# Patient Record
Sex: FEMALE | Race: WHITE | Employment: UNEMPLOYED | URBAN - METROPOLITAN AREA
[De-identification: names, ages, dates, MRNs, and addresses within clinical notes are randomized per-mention and may not be internally consistent; named-entity substitution may affect disease eponyms.]

---

## 2017-01-31 ENCOUNTER — TELEPHONE (OUTPATIENT)
Dept: ONCOLOGY | Age: 27
End: 2017-01-31

## 2017-01-31 NOTE — TELEPHONE ENCOUNTER
She said she rcvd the order for the patient from you, but it's wrong, she may have said the wrong thing, but she meant to tell you it should be for [facility].

## 2018-02-28 ENCOUNTER — OFFICE VISIT (OUTPATIENT)
Dept: SURGERY | Age: 28
End: 2018-02-28

## 2018-02-28 ENCOUNTER — HOSPITAL ENCOUNTER (OUTPATIENT)
Dept: LAB | Age: 28
Discharge: HOME | End: 2018-02-28
Payer: COMMERCIAL

## 2018-02-28 VITALS
BODY MASS INDEX: 44.03 KG/M2 | WEIGHT: 274 LBS | RESPIRATION RATE: 16 BRPM | SYSTOLIC BLOOD PRESSURE: 123 MMHG | HEART RATE: 96 BPM | OXYGEN SATURATION: 98 % | DIASTOLIC BLOOD PRESSURE: 77 MMHG | HEIGHT: 66 IN

## 2018-02-28 DIAGNOSIS — K90.9 INTESTINAL MALABSORPTION, UNSPECIFIED TYPE: ICD-10-CM

## 2018-02-28 DIAGNOSIS — K90.9 INTESTINAL MALABSORPTION, UNSPECIFIED TYPE: Primary | ICD-10-CM

## 2018-02-28 LAB
25(OH)D3 SERPL-MCNC: 16.5 NG/ML (ref 30–100)
ALBUMIN SERPL-MCNC: 3.8 G/DL (ref 3.4–5)
ALBUMIN/GLOB SERPL: 0.9 {RATIO} (ref 0.8–1.7)
ALP SERPL-CCNC: 86 U/L (ref 45–117)
ALT SERPL-CCNC: 25 U/L (ref 13–56)
ANION GAP SERPL CALC-SCNC: 12 MMOL/L (ref 3–18)
AST SERPL-CCNC: 19 U/L (ref 15–37)
BASOPHILS # BLD: 0 K/UL (ref 0–0.06)
BASOPHILS NFR BLD: 0 % (ref 0–2)
BILIRUB SERPL-MCNC: 0.3 MG/DL (ref 0.2–1)
BUN SERPL-MCNC: 14 MG/DL (ref 7–18)
BUN/CREAT SERPL: 19 (ref 12–20)
CALCIUM SERPL-MCNC: 8.8 MG/DL (ref 8.5–10.1)
CHLORIDE SERPL-SCNC: 107 MMOL/L (ref 100–108)
CO2 SERPL-SCNC: 21 MMOL/L (ref 21–32)
CREAT SERPL-MCNC: 0.74 MG/DL (ref 0.6–1.3)
DIFFERENTIAL METHOD BLD: ABNORMAL
EOSINOPHIL # BLD: 0.1 K/UL (ref 0–0.4)
EOSINOPHIL NFR BLD: 1 % (ref 0–5)
ERYTHROCYTE [DISTWIDTH] IN BLOOD BY AUTOMATED COUNT: 13.3 % (ref 11.6–14.5)
GLOBULIN SER CALC-MCNC: 4.3 G/DL (ref 2–4)
GLUCOSE SERPL-MCNC: 95 MG/DL (ref 74–99)
HCT VFR BLD AUTO: 43.8 % (ref 35–45)
HGB BLD-MCNC: 14.6 G/DL (ref 12–16)
LYMPHOCYTES # BLD: 2.5 K/UL (ref 0.9–3.6)
LYMPHOCYTES NFR BLD: 34 % (ref 21–52)
MCH RBC QN AUTO: 28 PG (ref 24–34)
MCHC RBC AUTO-ENTMCNC: 33.3 G/DL (ref 31–37)
MCV RBC AUTO: 83.9 FL (ref 74–97)
MONOCYTES # BLD: 0.3 K/UL (ref 0.05–1.2)
MONOCYTES NFR BLD: 5 % (ref 3–10)
NEUTS SEG # BLD: 4.4 K/UL (ref 1.8–8)
NEUTS SEG NFR BLD: 60 % (ref 40–73)
PLATELET # BLD AUTO: 269 K/UL (ref 135–420)
PMV BLD AUTO: 8.7 FL (ref 9.2–11.8)
POTASSIUM SERPL-SCNC: 4.4 MMOL/L (ref 3.5–5.5)
PROT SERPL-MCNC: 8.1 G/DL (ref 6.4–8.2)
RBC # BLD AUTO: 5.22 M/UL (ref 4.2–5.3)
SODIUM SERPL-SCNC: 140 MMOL/L (ref 136–145)
WBC # BLD AUTO: 7.4 K/UL (ref 4.6–13.2)

## 2018-02-28 PROCEDURE — 36415 COLL VENOUS BLD VENIPUNCTURE: CPT | Performed by: SPECIALIST

## 2018-02-28 PROCEDURE — 84443 ASSAY THYROID STIM HORMONE: CPT | Performed by: SPECIALIST

## 2018-02-28 PROCEDURE — 82607 VITAMIN B-12: CPT | Performed by: SPECIALIST

## 2018-02-28 PROCEDURE — 80053 COMPREHEN METABOLIC PANEL: CPT | Performed by: SPECIALIST

## 2018-02-28 PROCEDURE — 84425 ASSAY OF VITAMIN B-1: CPT | Performed by: SPECIALIST

## 2018-02-28 PROCEDURE — 82306 VITAMIN D 25 HYDROXY: CPT | Performed by: SPECIALIST

## 2018-02-28 PROCEDURE — 85025 COMPLETE CBC W/AUTO DIFF WBC: CPT | Performed by: SPECIALIST

## 2018-02-28 PROCEDURE — 82728 ASSAY OF FERRITIN: CPT | Performed by: SPECIALIST

## 2018-02-28 PROCEDURE — 83540 ASSAY OF IRON: CPT | Performed by: SPECIALIST

## 2018-02-28 RX ORDER — CLONAZEPAM 0.5 MG/1
TABLET ORAL
Refills: 0 | COMMUNITY
Start: 2018-01-11 | End: 2019-05-09 | Stop reason: ALTCHOICE

## 2018-02-28 RX ORDER — FLUOXETINE 20 MG/1
TABLET ORAL
Refills: 2 | COMMUNITY
Start: 2018-01-11 | End: 2018-05-09

## 2018-02-28 RX ORDER — LANOLIN ALCOHOL/MO/W.PET/CERES
1000 CREAM (GRAM) TOPICAL DAILY
COMMUNITY
End: 2018-05-16

## 2018-02-28 RX ORDER — LEVONORGESTREL / ETHINYL ESTRADIOL AND ETHINYL ESTRADIOL 150-30(84)
KIT ORAL
Refills: 2 | COMMUNITY
Start: 2018-02-11 | End: 2019-05-09 | Stop reason: ALTCHOICE

## 2018-02-28 NOTE — PROGRESS NOTES
1. Have you been to the ER, urgent care clinic since your last visit? Hospitalized since your last visit? No    2. Have you seen or consulted any other health care providers outside of the [address] [place] [address] since your last visit? Include any pap smears or colon screening. Yes Where: Dr Bria Lawrence, x 1 month ago.

## 2018-02-28 NOTE — PROGRESS NOTES
Revision Surgery Consultation    Subjective: The patient is a 33 y.o. obese female with a Body mass index is 44.22 kg/(m^2). June Glee The patient had a laparoscopic gastric bypass procedure done approximatly 11 years ago in [facility] by Dr. Meghann Cardenas.  her starting weight prior to surgery was 260.  she ultimately lost approximately 81 lbs with a subsequent weight regain of all her weight. her peak EBWL at 2 years out from surgery was 66% and now her current EBWL is 0. her last bariatric follow-up was innumerous ago with Dr. Meghann Cardenas. Bonnie Myles notes that she had no issues in the immediate post-op phase and had no hospital readmissions in the remote post-op phase. she currently is having the following issues related to his health:Weight regain. she is here today to discuss a possible revision of her prior because of weight gain. All of their prior evaluations available by both their PCP's and specialists physicians have been reviewed today either in the Care Everywhere portal or scanned under the media tab. I have spent a large portion of my initial consultation today reviewing the patients current dietary habits which have contributed to their health issues, weight regain and  their current obesity. They understand that generally speaking,  weight regain is  a function of resuming less that ideal dietary habits instead of being a procedural issue. They understand that older procedures are more likely to be associated with a less that perfect procedural result, such as a prior vertical banded gastroplasty or non divided gastric bypass. These procedures are more likely to result in staple line failures with resultant weight regain. This has been explained to the patient via diagrams of these older procedures and given to the patient.     I have suggested to them personally a dietary regimen that they can initiate now to help with their status as it pertains to their weight. They understand that the most important aspect of their journey through their weight loss endeavor will be their adherence to a new lifestyle of healthy eating behavior. They also understand that an adherence to an exercise program will not only help with weight loss but is ultimately important in weight maintenance. Patient Active Problem List    Diagnosis Date Noted    Morbid obesity with BMI of 40.0-44.9, adult ([facility]     Morbid obesity ([facility]     Depression     Borderline diabetes     Intestinal malabsorption     Iron deficiency anemia secondary to inadequate dietary iron intake 12/21/2016    B12 deficiency 12/21/2016    Morbid obesity due to excess calories ([facility] [address] [facility] 07/30/2016    H/O gastric bypass 07/30/2016      Past Surgical History:   Procedure Laterality Date    HX GI      gastric bypass-2007      Social History   Substance Use Topics    Smoking status: Never Smoker    Smokeless tobacco: Never Used    Alcohol use No      Family History   Problem Relation Age of Onset    No Known Problems Mother     No Known Problems Father     No Known Problems Sister     No Known Problems Brother     No Known Problems Maternal Aunt     No Known Problems Maternal Uncle     No Known Problems Paternal Aunt     No Known Problems Paternal Uncle     No Known Problems Maternal Grandmother     No Known Problems Maternal Grandfather     No Known Problems Paternal Grandmother     No Known Problems Paternal Grandfather     No Known Problems Other       Current Outpatient Prescriptions   Medication Sig Dispense Refill    FLUoxetine (PROZAC) 20 mg tablet TAKE 1 TABLET BY MOUTH AT BEDTIME  2    clonazePAM (KLONOPIN) 0.5 mg tablet TAKE 1 TABLET BY MOUTH EVERY DAY AS NEEDED PANIC/ANXIETY  0    L-Norgest&E Estradiol-E Estrad 0.15 mg-30 mcg (84)/10 mcg (7) 3MPk TAKE 1 TABLET BY MOUTH DAILY  2    cyanocobalamin 1,000 mcg tablet Take 1,000 mcg by mouth daily.       MULTIVITS,CA,MINERALS/IRON/FA (ONE-A-DAY WOMENS FORMULA PO) Take  by mouth.        No Known Allergies       Review of Systems:            General - No history or complaints of unexpected fever, chills, or weight loss  Head/Neck - No history or complaints of headache, diplopia, dysphagia, hearing loss  Cardiac - No history or complaints of chest pain, palpitations, murmur, or shortness of breath  Pulmonary - No history or complaints of shortness of breath, productive cough, hemoptysis  Gastrointestinal - No history or complaints of reflux,  abdominal pain, obstipation/constipation, blood per rectum  Genitourinary - No history or complaints of hematuria/dysuria, stress urinary incontinence symptoms, or renal lithiasis  Musculoskeletal - No history or complaints of joint pain or muscular weakness  Hematologic - No history or complaints of bleeding disorders, blood transfusions, sickle cell anemia  Neurologic - No history or complaints of  migraine headaches, seizure activity, syncopal episodes, TIA or stroke  Integumentary - No history or complaints of rashes, abnormal nevi, skin cancer  Gynecological - No history of heavy menses/abnormal menses           Objective:     Visit Vitals    /77 (BP 1 Location: Left arm, BP Patient Position: Sitting)    Pulse 96    Resp 16    Ht 5' 6\" (1.676 m)    Wt 124.3 kg (274 lb)    LMP 01/01/2018 (Approximate)    SpO2 98%    BMI 44.22 kg/m2       Physical Examination: General appearance - alert, well appearing, and in no distress and oriented to person, place, and time  Mental status - alert, oriented to person, place, and time, normal mood, behavior, speech, dress, motor activity, and thought processes  Eyes - pupils equal and reactive, extraocular eye movements intact, sclera anicteric, left eye normal, right eye normal  Ears - right ear normal, left ear normal  Nose - normal and patent, no erythema, discharge or polyps  Mouth - mucous membranes moist, pharynx normal without lesions  Neck - supple, no significant adenopathy  Lymphatics - no palpable lymphadenopathy, no hepatosplenomegaly  Chest - clear to auscultation, no wheezes, rales or rhonchi, symmetric air entry  Heart - normal rate, regular rhythm, normal S1, S2, no murmurs, rubs, clicks or gallops  Abdomen - soft, nontender, nondistended, no masses or organomegaly  Back exam - full range of motion, no tenderness, palpable spasm or pain on motion  Neurological - alert, oriented, normal speech, no focal findings or movement disorder noted  Musculoskeletal - no joint tenderness, deformity or swelling  Extremities - peripheral pulses normal, no pedal edema, no clubbing or cyanosis  Skin - normal coloration and turgor, no rashes, no suspicious skin lesions noted    Labs / Old Records: Old operative reports reviewed if available and are scanned under the media tab or reviewed under Care Everywhere        Assessment:     Morbid obesity status post Lap gastric bypass procedure approximately 11 years ago with complaint of weight regain. Plan: 1. Weight regain-Today in our office I had a lengthy discussion with Hodan Garcia regarding the nature of their prior procedure. We discussed the anatomical changes to their anatomy and how this relates to  contributing weight regain. Our office will continue to attempt to obtain any medical records, if not obtained or available already ,related to their procedure. It was also discussed today that before any decisions can be made regarding a possible revision of their initial  procedure that an upper GI swallow study must be obtained to evaluate their post surgical anatomy. They understand that in patients with a prior open gastric bypass, those patients are not revision candidates due to adhesive disease usually, and the fact that post surgical anatomy  usually can not be improved upon.   They understand if their gastric bypass was performed laparoscopically, then this situation might be more amendable to gastric band placement over their prior gastric bypass. They understand, as I have explained today, that the adhesive disease associated with prior  procedures is at times a rate limiting factor. This precludes our ability to perform a revision procedure safely. The factors that contribute to this are increased risk such as age, health issues and increased risk from a procedural standpoint and have been discussed today. We will proceed with the UGI swallow study as described above. The patient understands all of the above and wishes to proceed with the study. 2.Nutrition-  I have discussed in detail the pitfalls in diet that have contributed to their weight regain and the importance of adhering to a lifelong regimen of dietary goals and proper eating habits. I have discussed the proper lifelong bariatric diet  in detail spending in excess of 20 minutes discussing this. We will schedule them for a dietary consultation with our nutritionist and urge them to continue on a regular follow-up schedule with her. 3.Maintenance vitamins- Today we have discussed the importance of vitamins as it pertains to their procedure and we will obtain appropriate lab to check all levels. They have been provided a handout regarding this today. Total time spent with the patient reviewing their complex history of bariatric surgery,diet, and plan is in excess of 60 minutes.       Secondary Diagnoses:         Signed By: Sridhar Valerio MD     February 28, 2018

## 2018-02-28 NOTE — PATIENT INSTRUCTIONS
Body Mass Index: Care Instructions  Your Care Instructions    Body mass index (BMI) can help you see if your weight is raising your risk for health problems. It uses a formula to compare how much you weigh with how tall you are. · A BMI lower than 18.5 is considered underweight. · A BMI between 18.5 and 24.9 is considered healthy. · A BMI between 25 and 29.9 is considered overweight. A BMI of 30 or higher is considered obese. If your BMI is in the normal range, it means that you have a lower risk for weight-related health problems. If your BMI is in the overweight or obese range, you may be at increased risk for weight-related health problems, such as high blood pressure, heart disease, stroke, arthritis or joint pain, and diabetes. If your BMI is in the underweight range, you may be at increased risk for health problems such as fatigue, lower protection (immunity) against illness, muscle loss, bone loss, hair loss, and hormone problems. BMI is just one measure of your risk for weight-related health problems. You may be at higher risk for health problems if you are not active, you eat an unhealthy diet, or you drink too much alcohol or use tobacco products. Follow-up care is a key part of your treatment and safety. Be sure to make and go to all appointments, and call your doctor if you are having problems. It's also a good idea to know your test results and keep a list of the medicines you take. How can you care for yourself at home? · Practice healthy eating habits. This includes eating plenty of fruits, vegetables, whole grains, lean protein, and low-fat dairy. · If your doctor recommends it, get more exercise. Walking is a good choice. Bit by bit, increase the amount you walk every day. Try for at least 30 minutes on most days of the week. · Do not smoke. Smoking can increase your risk for health problems. If you need help quitting, talk to your doctor about stop-smoking programs and medicines. These can increase your chances of quitting for good. · Limit alcohol to 2 drinks a day for men and 1 drink a day for women. Too much alcohol can cause health problems. If you have a BMI higher than 25  · Your doctor may do other tests to check your risk for weight-related health problems. This may include measuring the distance around your waist. A waist measurement of more than 40 inches in men or 35 inches in women can increase the risk of weight-related health problems. · Talk with your doctor about steps you can take to stay healthy or improve your health. You may need to make lifestyle changes to lose weight and stay healthy, such as changing your diet and getting regular exercise. If you have a BMI lower than 18.5  · Your doctor may do other tests to check your risk for health problems. · Talk with your doctor about steps you can take to stay healthy or improve your health. You may need to make lifestyle changes to gain or maintain weight and stay healthy, such as getting more healthy foods in your diet and doing exercises to build muscle. Where can you learn more? Go to http://hernando-caitlyn.info/. Enter S176 in the search box to learn more about \"Body Mass Index: Care Instructions. \"  Current as of: October 13, 2016  Content Version: 11.4  © 4799-8640 [employer]. Care instructions adapted under license by [employer] (which disclaims liability or warranty for this information). If you have questions about a medical condition or this instruction, always ask your healthcare professional. [place] [address] any warranty or liability for your use of this information. Patient Instructions      1. Continue to monitor carbohydrate and protein intake- remember to keep your           total  carbohydrates to 50 grams or less per day for best results.   2. Remember hydration goals - usually 48 to 64 ounces of liquids per day  3. Continue to work towards exercise goals - minimum 3 days per week of 45          minutes to  1 hour at a time. 4. Remember to take vitamins as directed        Supplement Resource Guide    Importance of Protein:   Maintains lean body mass, produces antibodies to fight off infections, heals wounds, minimizes hair loss, helps to give you energy, helps with satiety, and keeping you full between meals. Importance of Calcium:  Needed for healthy bones and teeth, normal blood clotting, and nervous system functioning, higher risk of osteoporosis and bone disease with non-compliance. Importance of Multivitamins: Many functions. Supply you with extra nutrients that you may be missing from food. May lead to iron deficiency anemia, weakness, fatigue, and many other symptoms with non-compliance. Importance of B Vitamins:  Important for red blood cell formation, metabolism, energy, and helps to maintain a healthy nervous system. Protein Supplement  Find one you like now. Use immediately after surgery. Look for:  35-50g protein each day from your protein supplement once you reach the progression diet. 0-3 g fat per serving  0-3 g sugar per serving    Protein drinks should be split in separate dosages. Recommend: Lifelong  1 year + Calcium Supplement:     Start taking within a month after surgery. Look for: Calcium Citrate Plus D (1500 mg per day)  Recommend: Citracal     .          Avoid chocolate chewable calcium. Can use chewable bariatric or [employer] brand or similar chewable. The body cannot absorb more than 500-600 mg @ a time. Take for Life Multi-vitamin Supplement:      1st Month After Surgery: Any complete chewable, such as: Butternuts Complete chewables. Avoid Butternut sours or gummies. They lack iron and other important nutrients and also have added sugar.     Continue with chewable vitamin or change to adult complete multivitamin one month after surgery. Menstruating women can take a prenatal vitamin. Make sure has at least 18 mg iron and 688-980 mcg folic acid):    Vitamin B12, B Complex Vitamin, and Biotin  Start taking within a month after surgery. Vitamin B12:  1000 mcg of Vitamin B12 three times weekly    Must take sublingually (meaning you take it under your tongue) or in a liquid drop form for easy absorption. B Complex Vitamin: Take a pill or liquid drop form once daily. Biotin: This vitamin can help prevent hair loss.     Recommend 5mg   (5000 mcg) a day  Biotin is Optional

## 2018-02-28 NOTE — MR AVS SNAPSHOT
[address] [facility] 
 
 
 [address] [place] [address] 
[phone] Patient: Herman Kan MRN: [id number] :1990 Visit Information Date & Time Provider Department Dept. Phone Encounter #  
 2018 11:30 AM [facility] 21  Follow-up Instructions Follow-up and Disposition History Your Appointments 3/7/2018  3:00 PM  
NUTRITION COUNSELING with TSS NUTR VISIT2 [employer] [facility] ([address]) Appt Note: 1 of [address] [place] [address] [place] [address]  
  
   
 [address] [place] [address] Upcoming Health Maintenance Date Due  
 HPV AGE 9Y-34Y (1 of 3 - Female 3 Dose Series) 10/22/2002 DTaP/Tdap/Td series (1 - Tdap) 10/22/2012 PAP AKA CERVICAL CYTOLOGY 10/22/2012 Influenza Age 5 to Adult 2017 Allergies as of 2018  Review Complete On: 2018 By: Shirlene Garcia MD  
 No Known Allergies Current Immunizations  Never Reviewed No immunizations on file. Not reviewed this visit You Were Diagnosed With   
  
 Codes Comments Intestinal malabsorption, unspecified type    -  Primary ICD-10-CM: K90.9 ICD-9-CM: 579.9 Vitals BP Pulse Resp Height(growth percentile) Weight(growth percentile) LMP  
 123/77 (BP 1 Location: Left arm, BP Patient Position: Sitting) 96 16 5' 6\" (1.676 m) 274 lb (124.3 kg) 2018 (Approximate) SpO2 BMI OB Status Smoking Status 98% 44.22 kg/m2 Having regular periods Never Smoker Vitals History BMI and BSA Data Body Mass Index Body Surface Area  
 44.22 kg/m 2 2.41 m 2 Your Updated Medication List  
  
   
This list is accurate as of 18 12:27 PM.  Always use your most recent med list.  
  
  
  
  
 clonazePAM 0.5 mg tablet Commonly known as:  KlonoPIN  
TAKE 1 TABLET BY MOUTH EVERY DAY AS NEEDED PANIC/ANXIETY  
  
 cyanocobalamin 1,000 mcg tablet Take 1,000 mcg by mouth daily. FLUoxetine 20 mg tablet Commonly known as:  PROzac TAKE 1 TABLET BY MOUTH AT BEDTIME  
  
 L-Norgest&E Estradiol-E Estrad 0.15 mg-30 mcg (84)/10 mcg (7) 3mpk TAKE 1 TABLET BY MOUTH DAILY  
  
 ONE-A-DAY WOMENS FORMULA PO Take  by mouth. To-Do List   
 02/28/2018 Lab:  TSH 3RD GENERATION   
  
 02/28/2018 Lab:  VITAMIN D, 25 HYDROXY   
  
 04/29/2018 Lab:  CBC WITH AUTOMATED DIFF   
  
 04/29/2018 Lab:  FERRITIN   
  
 04/29/2018 Lab:  IRON   
  
 04/29/2018 Lab:  METABOLIC PANEL, COMPREHENSIVE   
  
 04/29/2018 Lab:  VITAMIN B1, WHOLE BLOOD   
  
 04/29/2018 Lab:  VITAMIN B12 & FOLATE Patient Instructions Body Mass Index: Care Instructions Your Care Instructions Body mass index (BMI) can help you see if your weight is raising your risk for health problems. It uses a formula to compare how much you weigh with how tall you are. · A BMI lower than 18.5 is considered underweight. · A BMI between 18.5 and 24.9 is considered healthy. · A BMI between 25 and 29.9 is considered overweight. A BMI of 30 or higher is considered obese. If your BMI is in the normal range, it means that you have a lower risk for weight-related health problems. If your BMI is in the overweight or obese range, you may be at increased risk for weight-related health problems, such as high blood pressure, heart disease, stroke, arthritis or joint pain, and diabetes. If your BMI is in the underweight range, you may be at increased risk for health problems such as fatigue, lower protection (immunity) against illness, muscle loss, bone loss, hair loss, and hormone problems. BMI is just one measure of your risk for weight-related health problems. You may be at higher risk for health problems if you are not active, you eat an unhealthy diet, or you drink too much alcohol or use tobacco products. Follow-up care is a key part of your treatment and safety. Be sure to make and go to all appointments, and call your doctor if you are having problems. It's also a good idea to know your test results and keep a list of the medicines you take. How can you care for yourself at home? · Practice healthy eating habits. This includes eating plenty of fruits, vegetables, whole grains, lean protein, and low-fat dairy. · If your doctor recommends it, get more exercise. Walking is a good choice. Bit by bit, increase the amount you walk every day. Try for at least 30 minutes on most days of the week. · Do not smoke. Smoking can increase your risk for health problems. If you need help quitting, talk to your doctor about stop-smoking programs and medicines. These can increase your chances of quitting for good. · Limit alcohol to 2 drinks a day for men and 1 drink a day for women. Too much alcohol can cause health problems. If you have a BMI higher than 25 · Your doctor may do other tests to check your risk for weight-related health problems. This may include measuring the distance around your waist. A waist measurement of more than 40 inches in men or 35 inches in women can increase the risk of weight-related health problems. · Talk with your doctor about steps you can take to stay healthy or improve your health. You may need to make lifestyle changes to lose weight and stay healthy, such as changing your diet and getting regular exercise. If you have a BMI lower than 18.5 · Your doctor may do other tests to check your risk for health problems. · Talk with your doctor about steps you can take to stay healthy or improve your health.  You may need to make lifestyle changes to gain or maintain weight and stay healthy, such as getting more healthy foods in your diet and doing exercises to build muscle. Where can you learn more? Go to http://hernando-caitlyn.info/. Enter S176 in the search box to learn more about \"Body Mass Index: Care Instructions. \" Current as of: October 13, 2016 Content Version: 11.4 © 3098-4674 [employer]. Care instructions adapted under license by [employer] (which disclaims liability or warranty for this information). If you have questions about a medical condition or this instruction, always ask your healthcare professional. [place] [address] any warranty or liability for your use of this information. Patient Instructions 1. Continue to monitor carbohydrate and protein intake- remember to keep your           total  carbohydrates to 50 grams or less per day for best results. 2. Remember hydration goals - usually 48 to 64 ounces of liquids per day 3. Continue to work towards exercise goals - minimum 3 days per week of 45          minutes to  1 hour at a time. 4. Remember to take vitamins as directed Supplement Resource Guide Importance of Protein:  
Maintains lean body mass, produces antibodies to fight off infections, heals wounds, minimizes hair loss, helps to give you energy, helps with satiety, and keeping you full between meals. Importance of Calcium: 
Needed for healthy bones and teeth, normal blood clotting, and nervous system functioning, higher risk of osteoporosis and bone disease with non-compliance. Importance of Multivitamins: Many functions. Supply you with extra nutrients that you may be missing from food. May lead to iron deficiency anemia, weakness, fatigue, and many other symptoms with non-compliance. Importance of B Vitamins: 
Important for red blood cell formation, metabolism, energy, and helps to maintain a healthy nervous system. Protein Supplement Find one you like now. Use immediately after surgery. Look for: 
35-50g protein each day from your protein supplement once you reach the progression diet. 0-3 g fat per serving 0-3 g sugar per serving Protein drinks should be split in separate dosages. Recommend: Lifelong 1 year + Calcium Supplement:  
 
Start taking within a month after surgery. Look for: Calcium Citrate Plus D (1500 mg per day) Recommend: Citracal 
 
 . Avoid chocolate chewable calcium. Can use chewable bariatric or [employer]NC brand or similar chewable. The body cannot absorb more than 500-600 mg @ a time. Take for Life Multi-vitamin Supplement:   
 
1st Month After Surgery: Any complete chewable, such as: Llanos Complete chewables. Avoid Llano sours or gummies. They lack iron and other important nutrients and also have added sugar. Continue with chewable vitamin or change to adult complete multivitamin one month after surgery. Menstruating women can take a prenatal vitamin. Make sure has at least 18 mg iron and 515-000 mcg folic acid): Vitamin B12, B Complex Vitamin, and Biotin Start taking within a month after surgery. Vitamin B12:  1000 mcg of Vitamin B12 three times weekly Must take sublingually (meaning you take it under your tongue) or in a liquid drop form for easy absorption. B Complex Vitamin: Take a pill or liquid drop form once daily. Biotin: This vitamin can help prevent hair loss. Recommend 5mg  
(5000 mcg) a day Biotin is Optional  
 
 
 
 
 
 Patient Instructions History Introducing [facility]! Miguelito Stallings introduces [employer] patient portal. Now you can access parts of your medical record, email your doctor's office, and request medication refills online. 1. In your internet browser, go to [email or web address] 2. Click on the First Time User? Click Here link in the Sign In box. You will see the New Member Sign Up page. 3. Enter your Aireon Access Code exactly as it appears below. You will not need to use this code after youve completed the sign-up process. If you do not sign up before the expiration date, you must request a new code. · Aireon Access Code: [email or web address] Expires: 5/29/2018 12:03 PM 
 
4. Enter the last four digits of your Social Security Number (xxxx) and Date of Birth (mm/dd/yyyy) as indicated and click Submit. You will be taken to the next sign-up page. 5. Create a Aireon ID. This will be your Aireon login ID and cannot be changed, so think of one that is secure and easy to remember. 6. Create a Aireon password. You can change your password at any time. 7. Enter your Password Reset Question and Answer. This can be used at a later time if you forget your password. 8. Enter your e-mail address. You will receive e-mail notification when new information is available in [address]. 9. Click Sign Up. You can now view and download portions of your medical record. 10. Click the Download Summary menu link to download a portable copy of your medical information. If you have questions, please visit the Frequently Asked Questions section of the Aireon website. Remember, Aireon is NOT to be used for urgent needs. For medical emergencies, dial 911. Now available from your iPhone and Android! Please provide this summary of care documentation to your next provider. Your primary care clinician is listed as Cristian Tanner. If you have any questions after today's visit, please call [phone].

## 2018-03-01 LAB
FERRITIN SERPL-MCNC: 9 NG/ML (ref 8–388)
FOLATE SERPL-MCNC: 16.1 NG/ML (ref 3.1–17.5)
IRON SERPL-MCNC: 84 UG/DL (ref 50–175)
TSH SERPL DL<=0.05 MIU/L-ACNC: 0.87 UIU/ML (ref 0.36–3.74)
VIT B12 SERPL-MCNC: 277 PG/ML (ref 211–911)

## 2018-03-02 LAB — VIT B1 BLD-SCNC: 137.4 NMOL/L (ref 66.5–200)

## 2018-03-06 ENCOUNTER — TELEPHONE (OUTPATIENT)
Dept: SURGERY | Age: 28
End: 2018-03-06

## 2018-03-06 RX ORDER — ERGOCALCIFEROL 1.25 MG/1
50000 CAPSULE ORAL 2 TIMES WEEKLY
Qty: 52 CAP | Refills: 1 | Status: SHIPPED | OUTPATIENT
Start: 2018-03-09 | End: 2018-05-16

## 2018-03-06 NOTE — TELEPHONE ENCOUNTER
----- Message from Arlene Jones MD sent at 3/3/2018 10:49 AM EST -----  Call in Vitamin D 50,000 units twice weekly for 1 year

## 2018-03-07 ENCOUNTER — CLINICAL SUPPORT (OUTPATIENT)
Dept: SURGERY | Age: 28
End: 2018-03-07

## 2018-03-07 ENCOUNTER — APPOINTMENT (OUTPATIENT)
Dept: GENERAL RADIOLOGY | Age: 28
End: 2018-03-07
Attending: SPECIALIST
Payer: COMMERCIAL

## 2018-03-07 ENCOUNTER — HOSPITAL ENCOUNTER (OUTPATIENT)
Age: 28
Setting detail: OUTPATIENT SURGERY
Discharge: HOME | End: 2018-03-07
Attending: SPECIALIST | Admitting: SPECIALIST
Payer: COMMERCIAL

## 2018-03-07 VITALS
DIASTOLIC BLOOD PRESSURE: 91 MMHG | OXYGEN SATURATION: 99 % | WEIGHT: 274.1 LBS | HEART RATE: 99 BPM | SYSTOLIC BLOOD PRESSURE: 131 MMHG | RESPIRATION RATE: 16 BRPM | TEMPERATURE: 97.4 F | BODY MASS INDEX: 44.05 KG/M2 | HEIGHT: 66 IN

## 2018-03-07 VITALS — WEIGHT: 276 LBS | BODY MASS INDEX: 44.36 KG/M2 | HEIGHT: 66 IN

## 2018-03-07 DIAGNOSIS — E66.01 MORBID OBESITY (HCC): ICD-10-CM

## 2018-03-07 DIAGNOSIS — E66.01 MORBID OBESITY (HCC): Primary | ICD-10-CM

## 2018-03-07 PROCEDURE — 76040000019: Performed by: SPECIALIST

## 2018-03-07 PROCEDURE — 74011000255 HC RX REV CODE- 255: Performed by: SPECIALIST

## 2018-03-07 PROCEDURE — 74240 X-RAY XM UPR GI TRC 1CNTRST: CPT

## 2018-03-07 NOTE — IP AVS SNAPSHOT
[place] 
 
 
 [address] [facility] [address] 
[phone] Patient: Sangeeta Cha MRN: [id number] :1990 About your hospitalization You were admitted on:  2018 You last received care in the:  THE [facility] ENDOSCOPY You were discharged on:  2018 Why you were hospitalized Your primary diagnosis was:  Not on File Follow-up Information None Your Scheduled Appointments 2018  9:30 AM EDT NUTRITION COUNSELING with TSS NUTR VISIT2 [facility] Shawn Ellis ([facility])  
 [address] [place] [address] [place] [address]  
[phone] Discharge Orders None A check jeimy indicates which time of day the medication should be taken. My Medications ASK your doctor about these medications Instructions Each Dose to Equal  
 Morning Noon Evening Bedtime  
 clonazePAM 0.5 mg tablet Commonly known as:  Marta Tan Your last dose was: Your next dose is: TAKE 1 TABLET BY MOUTH EVERY DAY AS NEEDED PANIC/ANXIETY  
     
   
   
   
  
 cyanocobalamin 1,000 mcg tablet Your last dose was: Your next dose is: Take 1,000 mcg by mouth daily. 1000 mcg  
    
   
   
   
  
 ergocalciferol 50,000 unit capsule Commonly known as:  ERGOCALCIFEROL Start taking on:  3/9/2018 Your last dose was: Your next dose is: Take 1 Cap by mouth two (2) times a week for 180 days. [address] Units FLUoxetine 20 mg tablet Commonly known as:  PROzac Your last dose was: Your next dose is: TAKE 1 TABLET BY MOUTH AT BEDTIME  
     
   
   
   
  
 L-Norgest&E Estradiol-E Estrad 0.15 mg-30 mcg (84)/10 mcg (7) 3mpk Your last dose was: Your next dose is: TAKE 1 TABLET BY MOUTH DAILY ONE-A-DAY WOMENS FORMULA PO Your last dose was: Your next dose is: Take  by mouth. Discharge Instructions Verbal and written post adjustment / UGI instructions given. Patient acknowledges understanding. Discussed diet, activities, and s/s that should be reported. Encouraged to call to schedule next appointment and to call with any questions or concerns. Introducing [facility]! [employer] introduces [employer] patient portal. Now you can access parts of your medical record, email your doctor's office, and request medication refills online. 1. In your internet browser, go to [email or web address] 2. Click on the First Time User? Click Here link in the Sign In box. You will see the New Member Sign Up page. 3. Enter your TimeS[employer] Systems Access Code exactly as it appears below. You will not need to use this code after youve completed the sign-up process. If you do not sign up before the expiration date, you must request a new code. · TimeSight Systems Access Code: [email or web address] Expires: 5/29/2018 12:03 PM 
 
4. Enter the last four digits of your Social Security Number (xxxx) and Date of Birth (mm/dd/yyyy) as indicated and click Submit. You will be taken to the next sign-up page. 5. Create a TimeSight Systems ID. This will be your TimeSight Systems login ID and cannot be changed, so think of one that is secure and easy to remember. 6. Create a TimeSight Systems password. You can change your password at any time. 7. Enter your Password Reset Question and Answer. This can be used at a later time if you forget your password. 8. Enter your e-mail address. You will receive e-mail notification when new information is available in [address] 9. Click Sign Up. You can now view and download portions of your medical record. 10. Click the Download Summary menu link to download a portable copy of your medical information. If you have questions, please visit the Frequently Asked Questions section of the MyChart website. Remember, [employer] is NOT to be used for urgent needs. For medical emergencies, dial 911. Now available from your iPhone and Android! Unresulted Labs-Please follow up with your PCP about these lab tests Order Current Status XR GASTROGRAFFIN UPPER GI In process Providers Seen During Your Hospitalization Provider Specialty Primary office phone Reyna Morgan MD General Surgery [phone] Your Primary Care Physician (PCP) Primary Care Physician Office Phone Office Fax Eric Baker [phone] You are allergic to the following No active allergies Recent Documentation Height Weight BMI OB Status Smoking Status 1.676 m 124.3 kg 44.24 kg/m2 Having regular periods Never Smoker Emergency Contacts Name Discharge Info Relation Home Work Mobile SewellMana guillen DISCHARGE CAREGIVER [3] Parent [1] [phone] Patient Belongings The following personal items are in your possession at time of discharge: 
                             
 
  
  
 Please provide this summary of care documentation to your next provider. Signatures-by signing, you are acknowledging that this After Visit Summary has been reviewed with you and you have received a copy. Patient Signature:  ____________________________________________________________ Date:  ____________________________________________________________  
  
[facility] Provider Signature:  ____________________________________________________________ Date:  ____________________________________________________________

## 2018-03-07 NOTE — PROGRESS NOTES
Medical Weight Loss Multi-Disciplinary Program    Name: Zeynep Wells   : 1990    Session# 1  Date: 3/7/2018     Height: 5' 6\" (167.6 cm)    Weight: 125.2 kg (276 lb) lbs. Body mass index is 44.55 kg/(m^2). Pounds Gained: 2    Dietary Instructions    Reviewed intake  Understanding label reading  Understanding low carbohydrates, low sugar, higher protein meals  Understanding proper portions  Dining outside home  Instruction given for personal dietary changes  Discussed perceived compliance  Comments: Pt given brief pre/post-op diet ed and diet hx reviewed. Physical Activity/Exercise    Discussed Perceived Compliance  Reasonable Goals Set  Motivation  Comments: patient usually walks 3-4 days a week for 60 minutes     Behavior Modification    Positive attitude  Comments: Pt is working on the following goals:  Goals:   1. Start an exercise routine of walking 3-4 days a week for 60 minutes  2. Work on increasing water from 2 bottles a day to 4-5 bottles a day - with the overall goal of 64 ounces a day before surgery   3. Work on  out your fluid and your food by 30 minutes   4. Continue using your protein shake as a meal replacement or snack (can drink up to 2 a day)     Candidate for surgery (per RD): Pending     Dietitian: Ronal Gupta is a 33 y.o. female who present for a pre-op evaluation.     Visit Vitals    Ht 5' 6\" (1.676 m)    Wt 125.2 kg (276 lb)    BMI 44.55 kg/m2     Past Medical History:   Diagnosis Date    Borderline diabetes     Depression     Intestinal malabsorption     Morbid obesity ([facility] [address]     Morbid obesity with BMI of 40.0-44.9, adult (HCC)     Psychotic disorder            Procedure:  patient is waiting for resutls from endoscopy to see if she will have a full revision of her pouch or if she will do laprscorpic band over bypass     Reasons for Surgery:  BMI > 40 with one or more medically significant comorbidities    Summary:  Pt given brief pre/post-op diet ed and diet hx reviewed. Pt instructed to follow a low calorie, low carbohydrate, high protein diet of about 4675-6426 calories daily. Pt set several goals. See below. Current Vitamins: vitamin D (prescription), B12, women's one a day MVI     What have you done in the past to try to lose weight? Patient had GBP done at 16, has done weight watchers, adovare, calorie counting     Why didn't you lose weight or keep the weight off?: Patient was able to lose weight with her first surgery but was not able to keep the weight off due to being the age of 14 and at this point she has more weight to lose than her original surgery and feels stuck. Why do you think having weight loss surgery will make it possible for you to lose weight and keep it off? Patient feels the surgery will be more successful and understands better what is expected of her at this age (31) and did not understand all of the lifestyle changes necessary at 14 and there was not follow-up after surgery. Patient Education and Materials Provided:  Supplement [facility], B Vitamin Information, MVI Recommendations, Calcium Citrate Information, Bariatric Supplement Companies, Protein Supplement Information, Fluid Requirements, No Caffeine or Carbonation, No Alcohol for One Year Post Op, 3 Balanced Meals a Day, Food Group Guide, Good Choices Dining Out, No Snacks, No Concentrated Sweets, Support System at Home, Exercising, Support Group Information and Addressed Current Habits / Changes to make    Nutritional Hx: What is the number of meals you eat per day? 2-3 meals   Comment: n/a     Do you eat between meals / snack? yes  Typical snack: pretty much anything     How fast do you eat your meals? slow    How often do you eat fast food? 1 times a week    How many sodas/sugared beverages do you drink per day? Drinks mountain dew (2 a day)     How many caffeinated drinks do you have per day? No coffee, once a week she'll drink tea     How much milk and/or juice do you drink per day? None     How much water do you drink per day? Maybe 2 bottles a day, if that     How often do you consume alcohol? never;     Diet History:  Breakfast  What are you eating and how much? eggs   When? ii   Where? iii   Snacks  What are you eating and how much? None    When? ii   Where? iii   Hydration  What are you eating and how much? Diet mountain dew    When? ii   Where? ii   Lunch  What are you eating and how much? Leftovers from dinner    When? ii   Where? iii   Snacks  What are you eating and how much? Chips or something crunchy (baked chips)    When? ii   Where? iii   Hydration  What are you eating and how much? Diet mountain dew    When? ii   Where? iii   Dinner  What are you eating and how much? Grilled chicken, corn, green beans, brown rice    When? ii   Where? iii   Snacks  What are you eating and how much? Bread and other carbohydrates    When? ii   Where? iii   Hydration  What are you eating and how much? Water or diet mountain dew    When? ii   Where? iii     Exercise:  Do you currently have an exercise routine? no    Goals:   1. Start an exercise routine of walking 3-4 days a week for 60 minutes  2. Work on increasing water from 2 bottles a day to 4-5 bottles a day - with the overall goal of 64 ounces a day before surgery   3. Work on  out your fluid and your food by 30 minutes   4.  Continue using your protein shake as a meal replacement or snack (can drink up to 2 a day)

## 2018-03-07 NOTE — PROCEDURES
11 years post Dr. Eleanor Martinez lap gastric bypass with probable larger than average pouch. Need EGD to see if candidate for revision. See dictation                    PATIENT:  Olga Lidia Rivera    CSN:  [id number]    DATE OF PROCEDURE:  03/07/2018    PREOPERATIVE DIAGNOSIS:  Patient now 11 years postoperative from gastric bypass procedure performed at [facility] with weight regain. POSTOPERATIVE DIAGNOSIS:  Patient now 11 years postoperative from gastric bypass procedure performed at [facility] with weight regain with slightly enlarged gastric pouch. PROCEDURE PERFORMED:  Upper gastrointestinal study with barium. SURGEON:  Alysha Kelley MD    ESTIMATED BLOOD LOSS:  None. SPECIMENS REMOVED:  None. ANESTHESIA:  None. ASSISTANT:  None. IMPLANTS:  None. COMPLICATIONS: None    STATEMENT OF MEDICAL NECESSITY:  The patient is a 78-year-old female who at age 14 underwent a laparoscopic gastric bypass procedure performed at [facility] for a diagnosis of morbid obesity. She lost a considerable amount of weight with the surgery, but then had slow weight regain. She presented to me in consultation for possible revision of surgery. At this time period, she is to undergo the study for such. PROCEDURE:  The patient was brought to the fluoro unit where she was given thin barium. On swallowing the barium, she was noted to have normal peristalsis of her esophagus. She had filling in the distal esophagus with tapering into and through the gastroesophageal junction. On evaluation of the patient fluoroscopically, she had a gastric bubble present within the fundus of her gastric pouch which was unusual and contrast filled a large gastric pouch, which was about at least twice normal size with a retained fundus and a very generous lower aspect of the pouch. Contrast flowed through the gastrojejunal anastomosis in normal fashion with nondilated Maria Luz limb being noted on examination.   At this juncture, the patient might be a candidate for revision of her gastric bypass procedure. We are going to perform an endoscopy on her to assess this further to determine if she is a candidate for revision of gastric bypass versus a Lap-Band placement of her gastric bypass pouch.         Gisel Nguyen MD

## 2018-03-07 NOTE — IP AVS SNAPSHOT
[address] [address] 
[phone] Patient: Cecile Peralta MRN: [id number] :1990 A check jeimy indicates which time of day the medication should be taken. My Medications ASK your doctor about these medications Instructions Each Dose to Equal  
 Morning Noon Evening Bedtime  
 clonazePAM 0.5 mg tablet Commonly known as:  Keke Bolivar Your last dose was: Your next dose is: TAKE 1 TABLET BY MOUTH EVERY DAY AS NEEDED PANIC/ANXIETY  
     
   
   
   
  
 cyanocobalamin 1,000 mcg tablet Your last dose was: Your next dose is: Take 1,000 mcg by mouth daily. 1000 mcg  
    
   
   
   
  
 ergocalciferol 50,000 unit capsule Commonly known as:  ERGOCALCIFEROL Start taking on:  3/9/2018 Your last dose was: Your next dose is: Take 1 Cap by mouth two (2) times a week for 180 days. [address]54 Units FLUoxetine 20 mg tablet Commonly known as:  PROzac Your last dose was: Your next dose is: TAKE 1 TABLET BY MOUTH AT BEDTIME  
     
   
   
   
  
 L-Norgest&E Estradiol-E Estrad 0.15 mg-30 mcg (84)/10 mcg (7) 3mpk Your last dose was: Your next dose is: TAKE 1 TABLET BY MOUTH DAILY  
     
   
   
   
  
 ONE-A-DAY WOMENS FORMULA PO Your last dose was: Your next dose is: Take  by mouth.

## 2018-03-07 NOTE — PATIENT INSTRUCTIONS
Goals: 1. Start an exercise routine of walking 3-4 days a week for 60 minutes  2. Work on increasing water from 2 bottles a day to 4-5 bottles a day - with the overall goal of 64 ounces a day before surgery   3. Work on  out your fluid and your food by 30 minutes   4.  Continue using your protein shake as a meal replacement or snack (can drink up to 2 a day)

## 2018-03-19 ENCOUNTER — HOSPITAL ENCOUNTER (OUTPATIENT)
Dept: LAB | Age: 28
Discharge: HOME | End: 2018-03-19
Payer: COMMERCIAL

## 2018-03-19 DIAGNOSIS — Z01.818 PRE-OP EVALUATION: ICD-10-CM

## 2018-03-19 LAB
ATRIAL RATE: 81 BPM
CALCULATED P AXIS, ECG09: 28 DEGREES
CALCULATED R AXIS, ECG10: 92 DEGREES
CALCULATED T AXIS, ECG11: 4 DEGREES
DIAGNOSIS, 93000: NORMAL
HCG SERPL QL: NEGATIVE
P-R INTERVAL, ECG05: 176 MS
Q-T INTERVAL, ECG07: 384 MS
QRS DURATION, ECG06: 104 MS
QTC CALCULATION (BEZET), ECG08: 446 MS
VENTRICULAR RATE, ECG03: 81 BPM

## 2018-03-19 PROCEDURE — 84703 CHORIONIC GONADOTROPIN ASSAY: CPT | Performed by: SPECIALIST

## 2018-03-19 PROCEDURE — 93005 ELECTROCARDIOGRAM TRACING: CPT

## 2018-03-19 PROCEDURE — 36415 COLL VENOUS BLD VENIPUNCTURE: CPT | Performed by: SPECIALIST

## 2018-03-23 ENCOUNTER — ANESTHESIA EVENT (OUTPATIENT)
Dept: ENDOSCOPY | Age: 28
End: 2018-03-23
Payer: COMMERCIAL

## 2018-03-23 ENCOUNTER — ANESTHESIA (OUTPATIENT)
Dept: ENDOSCOPY | Age: 28
End: 2018-03-23
Payer: COMMERCIAL

## 2018-03-23 ENCOUNTER — HOSPITAL ENCOUNTER (OUTPATIENT)
Age: 28
Setting detail: OUTPATIENT SURGERY
Discharge: HOME | End: 2018-03-23
Attending: SPECIALIST | Admitting: SPECIALIST
Payer: COMMERCIAL

## 2018-03-23 VITALS
HEART RATE: 87 BPM | SYSTOLIC BLOOD PRESSURE: 109 MMHG | BODY MASS INDEX: 44.27 KG/M2 | RESPIRATION RATE: 14 BRPM | HEIGHT: 66 IN | TEMPERATURE: 98.2 F | WEIGHT: 275.5 LBS | DIASTOLIC BLOOD PRESSURE: 68 MMHG | OXYGEN SATURATION: 99 %

## 2018-03-23 LAB — HCG SERPL QL: NEGATIVE

## 2018-03-23 PROCEDURE — 77030020263 HC SOL INJ SOD CL0.9% LFCR 1000ML: Performed by: SPECIALIST

## 2018-03-23 PROCEDURE — 74011250636 HC RX REV CODE- 250/636

## 2018-03-23 PROCEDURE — 74011000250 HC RX REV CODE- 250: Performed by: SPECIALIST

## 2018-03-23 PROCEDURE — 77030009426 HC FCPS BIOP ENDOSC BSC -B: Performed by: SPECIALIST

## 2018-03-23 PROCEDURE — 74011000250 HC RX REV CODE- 250

## 2018-03-23 PROCEDURE — 74011250636 HC RX REV CODE- 250/636: Performed by: SPECIALIST

## 2018-03-23 PROCEDURE — 84703 CHORIONIC GONADOTROPIN ASSAY: CPT | Performed by: SPECIALIST

## 2018-03-23 PROCEDURE — 88305 TISSUE EXAM BY PATHOLOGIST: CPT | Performed by: SPECIALIST

## 2018-03-23 PROCEDURE — 76060000031 HC ANESTHESIA FIRST 0.5 HR: Performed by: SPECIALIST

## 2018-03-23 PROCEDURE — 76040000019: Performed by: SPECIALIST

## 2018-03-23 PROCEDURE — 36415 COLL VENOUS BLD VENIPUNCTURE: CPT | Performed by: SPECIALIST

## 2018-03-23 RX ORDER — SODIUM CHLORIDE, SODIUM LACTATE, POTASSIUM CHLORIDE, CALCIUM CHLORIDE 600; 310; 30; 20 MG/100ML; MG/100ML; MG/100ML; MG/100ML
125 INJECTION, SOLUTION INTRAVENOUS CONTINUOUS
Status: DISCONTINUED | OUTPATIENT
Start: 2018-03-23 | End: 2018-03-23 | Stop reason: HOSPADM

## 2018-03-23 RX ORDER — LIDOCAINE HYDROCHLORIDE 20 MG/ML
INJECTION, SOLUTION EPIDURAL; INFILTRATION; INTRACAUDAL; PERINEURAL AS NEEDED
Status: DISCONTINUED | OUTPATIENT
Start: 2018-03-23 | End: 2018-03-23 | Stop reason: HOSPADM

## 2018-03-23 RX ORDER — PROPOFOL 10 MG/ML
INJECTION, EMULSION INTRAVENOUS AS NEEDED
Status: DISCONTINUED | OUTPATIENT
Start: 2018-03-23 | End: 2018-03-23 | Stop reason: HOSPADM

## 2018-03-23 RX ADMIN — PROPOFOL 100 MG: 10 INJECTION, EMULSION INTRAVENOUS at 13:34

## 2018-03-23 RX ADMIN — PROPOFOL 200 MG: 10 INJECTION, EMULSION INTRAVENOUS at 13:32

## 2018-03-23 RX ADMIN — SODIUM CHLORIDE, SODIUM LACTATE, POTASSIUM CHLORIDE, AND CALCIUM CHLORIDE 125 ML/HR: 600; 310; 30; 20 INJECTION, SOLUTION INTRAVENOUS at 12:40

## 2018-03-23 RX ADMIN — LIDOCAINE HYDROCHLORIDE 80 MG: 20 INJECTION, SOLUTION EPIDURAL; INFILTRATION; INTRACAUDAL; PERINEURAL at 13:32

## 2018-03-23 NOTE — ANESTHESIA PREPROCEDURE EVALUATION
Anesthetic History   No history of anesthetic complications            Review of Systems / Medical History  Patient summary reviewed, nursing notes reviewed and pertinent labs reviewed    Pulmonary  Within defined limits                 Neuro/Psych   Within defined limits           Cardiovascular  Within defined limits                Exercise tolerance: >4 METS     GI/Hepatic/Renal  Within defined limits              Endo/Other        Morbid obesity     Other Findings            Physical Exam    Airway  Mallampati: II  TM Distance: 4 - 6 cm  Neck ROM: normal range of motion   Mouth opening: Normal     Cardiovascular    Rhythm: regular  Rate: normal         Dental  No notable dental hx       Pulmonary  Breath sounds clear to auscultation               Abdominal  GI exam deferred       Other Findings            Anesthetic Plan    ASA: 2  Anesthesia type: MAC          Induction: Intravenous  Anesthetic plan and risks discussed with: Patient

## 2018-03-23 NOTE — DISCHARGE INSTRUCTIONS
[place]   [id number]  1990    EGD DISCHARGE INSTRUCTIONS    Discomfort:  Sore throat- throat lozenges or warm salt water gargle  redness at IV site- apply warm compress to area; if redness or soreness persist- contact your physician  Gaseous discomfort- walking, belching will help relieve any discomfort  You should not operate a vehicle for 12 hours  You should note engage in an occupation involving machinery or appliances for rest of today  You may note drink alcoholic beverages for at least 12 hours  Avoid making any critical decisions for at least 24 hour  DIET  You may resume your regular diet - however -  remember your colon is empty and a heavy meal will produce gas. Avoid these foods:  vegetables, fried / greasy foods, carbonated drinks    ACTIVITY  You may resume your normal daily activities   Spend the remainder of the day resting -  avoid any strenuous activity. CALL M.D. ANY SIGN OF   Increasing pain, nausea, vomiting  Abdominal distension (swelling)  New increased bleeding (oral or rectal)  Fever (chills)  Pain in chest area  Bloody discharge from nose or mouth  Shortness of breath       Follow-up Instructions:   Dr Arielle Perez will call you in one to two weeks. If you do not hear from him, please call his office [phone]. [place]   [id number]  1990        DISCHARGE SUMMARY from Nurse    The following personal items collected during your admission are returned to you:   Dental Appliance: Dental Appliances: None  Vision: Visual Aid: None  Hearing Aid:    Jewelry: Jewelry: Ring (given to mom )  Clothing: Clothing: Footwear, Jacket/Coat, Pants, Shirt, Socks, Undergarments (given to mom)  Other Valuables:  Other Valuables: None  Valuables sent to safe:      PATIENT INSTRUCTIONS:    Take Home Medications:

## 2018-03-23 NOTE — IP AVS SNAPSHOT
Fabiola Smart 
 
 
 [address] [address] 
[phone] Patient: Matilde Caceres MRN: [id number] :1990 About your hospitalization You were admitted on:  2018 [facility]  [facility] You were discharged on:  2018 Why you were hospitalized Your primary diagnosis was:  Not on File Follow-up Information Follow up With Details Comments Contact Info Cher Nelson MD   [address] 
[phone] Lalo Ortiz MD Call in 1 day(s)  [address] [facility] [address] [facility] 
[phone] Your Scheduled Appointments 2018 ESOPHAGOGASTRODUODENOSCOPY (EGD) with Lalo Ortiz MD  
THE [facility] ENDOSCOPY [facility] [place]) [address] [address]  
[phone] 2018  9:30 AM EDT NUTRITION COUNSELING with [facility] NUTR VISIT2 [facility] Katihe Rendon ([address] [facility])  
 [place] [address] [address] [facility]  
[phone] Discharge Orders None A check jeimy indicates which time of day the medication should be taken. My Medications CONTINUE taking these medications Instructions Each Dose to Equal  
 Morning Noon Evening Bedtime  
 clonazePAM 0.5 mg tablet Commonly known as:  Derek Mendez Your last dose was: Your next dose is: TAKE 1 TABLET BY MOUTH EVERY DAY AS NEEDED PANIC/ANXIETY  
     
   
   
   
  
 cyanocobalamin 1,000 mcg tablet Your last dose was: Your next dose is: Take 1,000 mcg by mouth daily. 1000 mcg  
    
   
   
   
  
 ergocalciferol 50,000 unit capsule Commonly known as:  ERGOCALCIFEROL Your last dose was: Your next dose is: Take 1 Cap by mouth two (2) times a week for 180 days. 76190 Units FLUoxetine 20 mg tablet Commonly known as:  PROzac Your last dose was: Your next dose is: TAKE 1 TABLET BY MOUTH AT BEDTIME  
     
   
   
   
  
 L-Norgest&E Estradiol-E Estrad 0.15 mg-30 mcg (84)/10 mcg (7) 3mpk Your last dose was: Your next dose is: TAKE 1 TABLET BY MOUTH DAILY  
     
   
   
   
  
 ONE-A-DAY WOMENS FORMULA PO Your last dose was: Your next dose is: Take  by mouth. Discharge Instructions [place] Blue [id number] 
1990 EGD DISCHARGE INSTRUCTIONS Discomfort: 
Sore throat- throat lozenges or warm salt water gargle 
redness at IV site- apply warm compress to area; if redness or soreness persist- contact your physician Gaseous discomfort- walking, belching will help relieve any discomfort You should not operate a vehicle for 12 hours You should note engage in an occupation involving machinery or appliances for rest of today You may note drink alcoholic beverages for at least 12 hours Avoid making any critical decisions for at least 24 hour DIET You may resume your regular diet  however -  remember your colon is empty and a heavy meal will produce gas. Avoid these foods:  vegetables, fried / greasy foods, carbonated drinks ACTIVITY You may resume your normal daily activities Spend the remainder of the day resting -  avoid any strenuous activity. CALL M.D. ANY SIGN OF Increasing pain, nausea, vomiting Abdominal distension (swelling) New increased bleeding (oral or rectal) Fever (chills) Pain in chest area Bloody discharge from nose or mouth Shortness of breath Follow-up Instructions: 
 Dr Arielle Perez will call you in one to two weeks. If you do not hear from him, please call his office [phone]. [place] Blue [id number] 
1990 DISCHARGE SUMMARY from Nurse The following personal items collected during your admission are returned to you:  
Dental Appliance: Dental Appliances: None Vision: Visual Aid: None Hearing Aid:   
Jewelry: Jewelry: Ring (given to mom ) Clothing: Clothing: Footwear, Jacket/Coat, Pants, Shirt, Socks, Undergarments (given to mom) Other Valuables: Other Valuables: None Valuables sent to safe:   
 
PATIENT INSTRUCTIONS: 
 
Take Home Medications: 
 
 
 
 
 
  
 
  
  
  
Introducing [employer]! Eneida Kruger introduces [employer] patient portal. Now you can access parts of your medical record, email your doctor's office, and request medication refills online. 1. In your internet browser, go to [email or web address]. [employer] 2. Click on the First Time User? Click Here link in the Sign In box. You will see the New Member Sign Up page. 3. Enter your [employer] Access Code exactly as it appears below. You will not need to use this code after youve completed the sign-up process. If you do not sign up before the expiration date, you must request a new code. · [employer] Access Code: [email or web address] Expires: 5/29/2018  1:03 PM 
 
4. Enter the last four digits of your Social Security Number (xxxx) and Date of Birth (mm/dd/yyyy) as indicated and click Submit. You will be taken to the next sign-up page. 5. Create a [employer] ID. This will be your [employer] login ID and cannot be changed, so think of one that is secure and easy to remember. 6. Create a [employer] password. You can change your password at any time. 7. Enter your Password Reset Question and Answer. This can be used at a later time if you forget your password. 8. Enter your e-mail address. You will receive e-mail notification when new information is available in [address] 9. Click Sign Up. You can now view and download portions of your medical record. 10. Click the Download Summary menu link to download a portable copy of your medical information. If you have questions, please visit the Frequently Asked Questions section of the MyChart website. Remember, [employer] is NOT to be used for urgent needs. For medical emergencies, dial 911. Now available from your iPhone and Android! Providers Seen During Your Hospitalization Provider Specialty Primary office phone Jackqulyn Jeans, MD General Surgery [phone] Your Primary Care Physician (PCP) Primary Care Physician Office Phone Office Fax Precious Lorenzo [phone] You are allergic to the following No active allergies Recent Documentation Height Weight BMI OB Status Smoking Status 1.676 m 125 kg 44.47 kg/m2 Having regular periods Never Smoker Emergency Contacts Name Discharge Info Relation Home Work Mobile SewellMana DISCHARGE CAREGIVER [3] Mother [14]   [phone] Patient Belongings The following personal items are in your possession at time of discharge: 
  Dental Appliances: None  Visual Aid: None      Home Medications: None   Jewelry: Ring (given to mom )  Clothing: Footwear, Jacket/Coat, Pants, Shirt, Socks, Undergarments (given to mom)    Other Valuables: None Please provide this summary of care documentation to your next provider. Signatures-by signing, you are acknowledging that this After Visit Summary has been reviewed with you and you have received a copy. Patient Signature:  ____________________________________________________________ Date:  ____________________________________________________________  
  
[facility] Provider Signature:  ____________________________________________________________ Date:  ____________________________________________________________

## 2018-03-23 NOTE — PROCEDURES
Esophagogastroduodenoscopy Procedure Note    Indications: 11 years post gastric bypass via [facility] with increased weight regain and larger than average pouch noted on UGI - pt seeking possible revision    Anesthesia/Sedation: MAC    Pre-Procedure Exam:  Airway: clear   Heart: normal S1and S2    Lungs: clear bilateral  Abdomen: soft, nontender, bowel sounds present and normal in all quadrants   Mental Status: awake, alert, and oriented to person, place, and time      Procedure in Detail:  Informed consent was obtained for the procedure, including conscious sedation. Risks of pancreatitis, infection, perforation, hemorrhage, adverse drug reaction, and aspiration were discussed. The patient was placed in the left lateral decubitus position. Based on the pre-procedure assessment, including review of the patient's medical history, medications, allergies, and review of systems, he had been deemed to be an appropriate candidate for moderate sedation; he was therefore sedated with the medications listed above. He was monitored continuously with electrocardiogram tracing, pulse oximetry, blood pressure monitoring, and direct observation. The [id number] gastroscope was inserted into the mouth and advanced under direct vision to proximal maria luz limb. A careful inspection was made as the gastroscope was withdrawn, including a retroflexed view of the proximal stomach; findings and interventions are described below. Appropriate photodocumentation was obtained.     Findings:     Oropharynx - normal mucosa without abnormalities  Esophagus - normal mucosa without webs, rings, or strictures, no esophagitis or ulcers  Gastric pouch - moderately enlarged gastric pouch with normal mucosa without evidence of ulcers   Anastomosis - normal diameter for timeframe without ulcers or other anatomical abnormalities  Maria Luz limb - normal mucosa without ulcers or obstruction    possible candidate for revision of prior gastric bypass - will complete [address] [place] [address] - decision if revision possible can only be made at time of surgery. Therapies:    biopsy of stomach gastric pouch    Specimens: No specimens were collected. Complications:   None; patient tolerated the procedure well. EBL:  None           Attending Attestation:  I performed the procedure. Recommendations:  - Await pathology. Signed by:  Kim Holder MD

## 2018-03-23 NOTE — H&P
EGD - History and Physical    Subjective: The patient is a 33 y.o. obese female who had a gastric bypass performed at [facility] 11 years ago. She had an UGI that showed a larger than average pouch. She is seeking a possible revision for further weight loss. She understands the need for an EGD before proceeding to a revision.     Bariatric comorbidities continue to include:   Patient Active Problem List   Diagnosis Code    Morbid obesity due to excess calories ([address] E66.01    H/O gastric bypass Z98.890    Iron deficiency anemia secondary to inadequate dietary iron intake D50.8    B12 deficiency E53.8    Morbid obesity with BMI of 40.0-44.9, adult ([facility]) E66.01, Z68.41    Morbid obesity ([address] E66.01    Depression F32.9    Borderline diabetes R73.03    Intestinal malabsorption K90.9        Patient Active Problem List    Diagnosis Date Noted    Morbid obesity with BMI of 40.0-44.9, adult ([address]     Morbid obesity ([address]     Depression     Borderline diabetes     Intestinal malabsorption     Iron deficiency anemia secondary to inadequate dietary iron intake 12/21/2016    B12 deficiency 12/21/2016    Morbid obesity due to excess calories ([address] 07/30/2016    H/O gastric bypass 07/30/2016      Past Surgical History:   Procedure Laterality Date    HX GI      gastric bypass-2007      Social History   Substance Use Topics    Smoking status: Never Smoker    Smokeless tobacco: Never Used    Alcohol use No      Family History   Problem Relation Age of Onset    No Known Problems Mother     No Known Problems Father     No Known Problems Sister     No Known Problems Brother     No Known Problems Maternal Aunt     No Known Problems Maternal Uncle     No Known Problems Paternal Aunt     No Known Problems Paternal Uncle     No Known Problems Maternal Grandmother     No Known Problems Maternal Grandfather     No Known Problems Paternal Grandmother     No Known Problems Paternal Grandfather     No Known Problems Other         No Known Allergies       Review of Systems:        General - No history or complaints of unexpected fever, chills, or weight loss  Head/Neck - No history or complaints of headache, diplopia, dysphagia, hearing loss  Cardiac - No history or complaints of chest pain, palpitations, murmur, or shortness of breath  Pulmonary - No history or complaints of shortness of breath, productive cough, hemoptysis  Gastrointestinal - mild reflux,no  abdominal pain, obstipation/constipation or blood per rectum  Genitourinary - No history or complaints of hematuria/dysuria, stress urinary incontinence symptoms, or renal lithiasis  Musculoskeletal - no joint pain in their knees,  no muscular weakness  Hematologic - No history or complaints of bleeding disorders,  No blood transfusions  Neurologic - No history or complaints of  migraine headaches, seizure activity, syncopal episodes, TIA or stroke  Integumentary - No history or complaints of rashes, abnormal nevi, skin cancer  Gynecological - unremakable    Objective: There were no vitals taken for this visit.     Physical Examination: General appearance - alert, well appearing, and in no distress  Mental status - alert, oriented to person, place, and time  Eyes - pupils equal and reactive, extraocular eye movements intact  Ears - bilateral TM's and external ear canals normal  Nose - normal and patent, no erythema, discharge or polyps  Mouth - mucous membranes moist, pharynx normal without lesions  Neck - supple, no significant adenopathy  Lymphatics - no palpable lymphadenopathy, no hepatosplenomegaly  Chest - clear to auscultation, no wheezes, rales or rhonchi, symmetric air entry  Heart - normal rate, regular rhythm, normal S1, S2, no murmurs, rubs, clicks or gallops  Abdomen - soft, nontender, nondistended, no masses or organomegaly  Back exam - full range of motion, no tenderness, palpable spasm or pain on motion  Neurological - alert, oriented, normal speech, no focal findings or movement disorder noted  Musculoskeletal - no joint tenderness, deformity or swelling  Extremities - peripheral pulses normal, no pedal edema, no clubbing or cyanosis  Skin - normal coloration and turgor, no rashes, no suspicious skin lesions noted    Labs :     Lab Results   Component Value Date/Time    WBC 7.4 02/28/2018 12:55 PM    HGB 14.6 02/28/2018 12:55 PM    HCT 43.8 02/28/2018 12:55 PM    PLATELET 542 92/52/0534 12:55 PM    MCV 83.9 02/28/2018 12:55 PM     Lab Results   Component Value Date/Time    Sodium 140 02/28/2018 12:55 PM    Potassium 4.4 02/28/2018 12:55 PM    Chloride 107 02/28/2018 12:55 PM    CO2 21 02/28/2018 12:55 PM    Anion gap 12 02/28/2018 12:55 PM    Glucose 95 02/28/2018 12:55 PM    BUN 14 02/28/2018 12:55 PM    Creatinine 0.74 02/28/2018 12:55 PM    BUN/Creatinine ratio 19 02/28/2018 12:55 PM    GFR est AA >60 02/28/2018 12:55 PM    GFR est non-AA >60 02/28/2018 12:55 PM    Calcium 8.8 02/28/2018 12:55 PM    Bilirubin, total 0.3 02/28/2018 12:55 PM    AST (SGOT) 19 02/28/2018 12:55 PM    Alk. phosphatase 86 02/28/2018 12:55 PM    Protein, total 8.1 02/28/2018 12:55 PM    Albumin 3.8 02/28/2018 12:55 PM    Globulin 4.3 (H) 02/28/2018 12:55 PM    A-G Ratio 0.9 02/28/2018 12:55 PM    ALT (SGPT) 25 02/28/2018 12:55 PM     Lab Results   Component Value Date/Time    Iron 84 02/28/2018 12:55 PM    TIBC 718 (H) 12/21/2016 01:00 PM    Iron % saturation 3 12/21/2016 01:00 PM    Ferritin 9 02/28/2018 12:55 PM     Lab Results   Component Value Date/Time    Folate 16.1 02/28/2018 12:55 PM     Lab Results   Component Value Date/Time    Vitamin D 25-Hydroxy 16.5 (L) 02/28/2018 12:55 PM           Assessment:     11 year post gastric bypass via [facility] with larger than average pouch on UGI seeking possible revision    Plan:     EGD    Plan for EGD. She understands the risks, benefits and alternatives of the EGD.   She understands the risk include but are not limited to; death, DVT/PE, damage to surrounding structures, perforation of the GI tract, equipment malfunction, bleeding, and infection. She understands all of the above and wishes to proceed with EGD.     Signed By: Angelita Ragland MD     March 23, 2018

## 2018-03-23 NOTE — INTERVAL H&P NOTE
H&P Update:  Alberta Carlos was seen and examined. History and physical has been reviewed. The patient has been examined.  There have been no significant clinical changes since the completion of the originally dated History and Physical.    Signed By: Feroz Limon MD     March 23, 2018 1:19 PM

## 2018-03-23 NOTE — ANESTHESIA POSTPROCEDURE EVALUATION
Post-Anesthesia Evaluation and Assessment    Cardiovascular Function/Vital Signs  Visit Vitals    /67    Pulse 86    Temp 36.7 °C (98 °F)    Resp 16    Ht 5' 6\" (1.676 m)    Wt 125 kg (275 lb 8 oz)    SpO2 100%    BMI 44.47 kg/m2       Patient is status post Procedure(s):  EGD WITH  BIOPSY . Nausea/Vomiting: Controlled. Postoperative hydration reviewed and adequate. Pain:  Pain Scale 1: Numeric (0 - 10) (03/23/18 1400)  Pain Intensity 1: 0 (03/23/18 1400)   Managed. Neurological Status:   Neuro (WDL): Within Defined Limits (03/23/18 1245)   At baseline. Mental Status and Level of Consciousness: Arousable. Pulmonary Status:   O2 Device: Room air (03/23/18 1400)   Adequate oxygenation and airway patent. Complications related to anesthesia: None    Post-anesthesia assessment completed. No concerns. Patient has met all discharge requirements.     Signed By: Amita Leija CRNA    March 23, 2018

## 2018-04-03 ENCOUNTER — NURSE NAVIGATOR (OUTPATIENT)
Dept: SURGERY | Age: 28
End: 2018-04-03

## 2018-04-04 ENCOUNTER — NURSE NAVIGATOR (OUTPATIENT)
Dept: SURGERY | Age: 28
End: 2018-04-04

## 2018-04-04 DIAGNOSIS — E66.01 MORBID OBESITY (HCC): Primary | ICD-10-CM

## 2018-04-06 ENCOUNTER — HOSPITAL ENCOUNTER (OUTPATIENT)
Dept: GENERAL RADIOLOGY | Age: 28
Discharge: HOME | End: 2018-04-06
Attending: SPECIALIST
Payer: COMMERCIAL

## 2018-04-06 DIAGNOSIS — E66.01 MORBID OBESITY (HCC): ICD-10-CM

## 2018-04-06 PROCEDURE — 74241 XR UPPER GI SERIES W KUB: CPT

## 2018-04-06 PROCEDURE — 74011000255 HC RX REV CODE- 255: Performed by: SPECIALIST

## 2018-04-06 RX ADMIN — BARIUM SULFATE 700 MG: 700 TABLET ORAL at 11:36

## 2018-04-06 RX ADMIN — BARIUM SULFATE 50 G: 960 POWDER, FOR SUSPENSION ORAL at 11:35

## 2018-04-06 RX ADMIN — BARIUM SULFATE 100 G: 960 POWDER, FOR SUSPENSION ORAL at 11:34

## 2018-04-11 ENCOUNTER — OFFICE VISIT (OUTPATIENT)
Dept: SURGERY | Age: 28
End: 2018-04-11

## 2018-04-11 VITALS
HEART RATE: 115 BPM | HEIGHT: 66 IN | BODY MASS INDEX: 44.68 KG/M2 | RESPIRATION RATE: 16 BRPM | DIASTOLIC BLOOD PRESSURE: 71 MMHG | SYSTOLIC BLOOD PRESSURE: 108 MMHG | OXYGEN SATURATION: 100 % | WEIGHT: 278 LBS

## 2018-04-11 DIAGNOSIS — Z98.84 H/O GASTRIC BYPASS: ICD-10-CM

## 2018-04-11 DIAGNOSIS — K31.89 DILATION OF STOMACH: Primary | ICD-10-CM

## 2018-04-11 DIAGNOSIS — R73.03 BORDERLINE DIABETES: ICD-10-CM

## 2018-04-11 DIAGNOSIS — E66.01 MORBID OBESITY (HCC): ICD-10-CM

## 2018-04-11 DIAGNOSIS — E53.8 B12 DEFICIENCY: ICD-10-CM

## 2018-04-11 DIAGNOSIS — K90.9 INTESTINAL MALABSORPTION, UNSPECIFIED TYPE: ICD-10-CM

## 2018-04-11 DIAGNOSIS — D50.8 IRON DEFICIENCY ANEMIA SECONDARY TO INADEQUATE DIETARY IRON INTAKE: ICD-10-CM

## 2018-04-11 DIAGNOSIS — E66.01 MORBID OBESITY WITH BMI OF 40.0-44.9, ADULT (HCC): ICD-10-CM

## 2018-04-11 NOTE — MR AVS SNAPSHOT
[facility] 
 
 
 [address] [place] [address] [facility] 
[phone] Patient: Ama Coker MRN: [id number] :1990 Visit Information Date & Time Provider Department Dept. Phone Encounter #  
 2018  9:30 AM MD Mirna Alvarez Burger [facility] [place] [id number] Your Appointments 2019  9:00 AM  
Office Visit with MD Mirna Alvarez Burger [facility]  
 [address] [place] [address]  
  
   
 [address] [place] [address] Upcoming Health Maintenance Date Due  
 HPV AGE 9Y-34Y (1 of 3 - Female 3 Dose Series) 10/22/2002 DTaP/Tdap/Td series (1 - Tdap) 10/22/2012 PAP AKA CERVICAL CYTOLOGY 10/22/2012 Influenza Age 5 to Adult 2017 Allergies as of 2018  Review Complete On: 2018 By: Kezia Krause No Known Allergies Current Immunizations  Never Reviewed No immunizations on file. Not reviewed this visit Vitals BP Pulse Resp Height(growth percentile) Weight(growth percentile) SpO2  
 108/71 (BP 1 Location: Left arm, BP Patient Position: Sitting) (!) 115 16 5' 6\" (1.676 m) 278 lb (126.1 kg) 100% BMI OB Status Smoking Status 44.87 kg/m2 Having regular periods Never Smoker Vitals History BMI and BSA Data Body Mass Index Body Surface Area 44.87 kg/m 2 2.42 m 2 Preferred Pharmacy Pharmacy Name Phone [id number], [address] [place] [address] Your Updated Medication List  
  
   
This list is accurate as of 18 10:32 AM.  Always use your most recent med list.  
  
  
  
  
 clonazePAM 0.5 mg tablet Commonly known as:  KlonoPIN  
TAKE 2 TABLET BY MOUTH EVERY DAY AS NEEDED PANIC/ANXIETY  
  
 cyanocobalamin 1,000 mcg tablet Take 1,000 mcg by mouth daily. ergocalciferol 50,000 unit capsule Commonly known as:  ERGOCALCIFEROL Take 1 Cap by mouth two (2) times a week for 180 days. FLUoxetine 20 mg tablet Commonly known as:  PROzac TAKE 1 TABLET BY MOUTH AT BEDTIME  
  
 L-Norgest&E Estradiol-E Estrad 0.15 mg-30 mcg (84)/10 mcg (7) 3mpk TAKE 1 TABLET BY MOUTH DAILY  
  
 ONE-A-DAY WOMENS FORMULA PO Take  by mouth. Patient Instructions Body Mass Index: Care Instructions Your Care Instructions Body mass index (BMI) can help you see if your weight is raising your risk for health problems. It uses a formula to compare how much you weigh with how tall you are. · A BMI lower than 18.5 is considered underweight. · A BMI between 18.5 and 24.9 is considered healthy. · A BMI between 25 and 29.9 is considered overweight. A BMI of 30 or higher is considered obese. If your BMI is in the normal range, it means that you have a lower risk for weight-related health problems. If your BMI is in the overweight or obese range, you may be at increased risk for weight-related health problems, such as high blood pressure, heart disease, stroke, arthritis or joint pain, and diabetes. If your BMI is in the underweight range, you may be at increased risk for health problems such as fatigue, lower protection (immunity) against illness, muscle loss, bone loss, hair loss, and hormone problems. BMI is just one measure of your risk for weight-related health problems. You may be at higher risk for health problems if you are not active, you eat an unhealthy diet, or you drink too much alcohol or use tobacco products. Follow-up care is a key part of your treatment and safety. Be sure to make and go to all appointments, and call your doctor if you are having problems. It's also a good idea to know your test results and keep a list of the medicines you take. How can you care for yourself at home? · Practice healthy eating habits. This includes eating plenty of fruits, vegetables, whole grains, lean protein, and low-fat dairy. · If your doctor recommends it, get more exercise. Walking is a good choice. Bit by bit, increase the amount you walk every day. Try for at least 30 minutes on most days of the week. · Do not smoke. Smoking can increase your risk for health problems. If you need help quitting, talk to your doctor about stop-smoking programs and medicines. These can increase your chances of quitting for good. · Limit alcohol to 2 drinks a day for men and 1 drink a day for women. Too much alcohol can cause health problems. If you have a BMI higher than 25 · Your doctor may do other tests to check your risk for weight-related health problems. This may include measuring the distance around your waist. A waist measurement of more than 40 inches in men or 35 inches in women can increase the risk of weight-related health problems. · Talk with your doctor about steps you can take to stay healthy or improve your health. You may need to make lifestyle changes to lose weight and stay healthy, such as changing your diet and getting regular exercise. If you have a BMI lower than 18.5 · Your doctor may do other tests to check your risk for health problems. · Talk with your doctor about steps you can take to stay healthy or improve your health. You may need to make lifestyle changes to gain or maintain weight and stay healthy, such as getting more healthy foods in your diet and doing exercises to build muscle. Where can you learn more? Go to http://hernando-caitlyn.info/. Enter S176 in the search box to learn more about \"Body Mass Index: Care Instructions. \" Current as of: October 13, 2016 Content Version: 11.4 © 9106-4575 [employer].  Care instructions adapted under license by [employer] (which disclaims liability or warranty for this information). If you have questions about a medical condition or this instruction, always ask your healthcare professional. [facility] [address] any warranty or liability for your use of this information. Introducing [facility]! [facility] introduces Jogli patient portal. Now you can access parts of your medical record, email your doctor's office, and request medication refills online. 1. In your internet browser, go to [email or web address]. [email or web address] 2. Click on the First Time User? Click Here link in the Sign In box. You will see the New Member Sign Up page. 3. Enter your Jogli Access Code exactly as it appears below. You will not need to use this code after youve completed the sign-up process. If you do not sign up before the expiration date, you must request a new code. · Jogli Access Code: [email or web address] Expires: 5/29/2018  1:03 PM 
 
4. Enter the last four digits of your Social Security Number (xxxx) and Date of Birth (mm/dd/yyyy) as indicated and click Submit. You will be taken to the next sign-up page. 5. Create a Jogli ID. This will be your Jogli login ID and cannot be changed, so think of one that is secure and easy to remember. 6. Create a Jogli password. You can change your password at any time. 7. Enter your Password Reset Question and Answer. This can be used at a later time if you forget your password. 8. Enter your e-mail address. You will receive e-mail notification when new information is available in [address] 9. Click Sign Up. You can now view and download portions of your medical record. 10. Click the Download Summary menu link to download a portable copy of your medical information. If you have questions, please visit the Frequently Asked Questions section of the Jogli website. Remember, Jogli is NOT to be used for urgent needs. For medical emergencies, dial 911. Now available from your iPhone and Android! Please provide this summary of care documentation to your next provider. Your primary care clinician is listed as Rosalind Alberto. If you have any questions after today's visit, please call [phone].

## 2018-04-11 NOTE — PATIENT INSTRUCTIONS
Body Mass Index: Care Instructions  Your Care Instructions    Body mass index (BMI) can help you see if your weight is raising your risk for health problems. It uses a formula to compare how much you weigh with how tall you are. · A BMI lower than 18.5 is considered underweight. · A BMI between 18.5 and 24.9 is considered healthy. · A BMI between 25 and 29.9 is considered overweight. A BMI of 30 or higher is considered obese. If your BMI is in the normal range, it means that you have a lower risk for weight-related health problems. If your BMI is in the overweight or obese range, you may be at increased risk for weight-related health problems, such as high blood pressure, heart disease, stroke, arthritis or joint pain, and diabetes. If your BMI is in the underweight range, you may be at increased risk for health problems such as fatigue, lower protection (immunity) against illness, muscle loss, bone loss, hair loss, and hormone problems. BMI is just one measure of your risk for weight-related health problems. You may be at higher risk for health problems if you are not active, you eat an unhealthy diet, or you drink too much alcohol or use tobacco products. Follow-up care is a key part of your treatment and safety. Be sure to make and go to all appointments, and call your doctor if you are having problems. It's also a good idea to know your test results and keep a list of the medicines you take. How can you care for yourself at home? · Practice healthy eating habits. This includes eating plenty of fruits, vegetables, whole grains, lean protein, and low-fat dairy. · If your doctor recommends it, get more exercise. Walking is a good choice. Bit by bit, increase the amount you walk every day. Try for at least 30 minutes on most days of the week. · Do not smoke. Smoking can increase your risk for health problems. If you need help quitting, talk to your doctor about stop-smoking programs and medicines. These can increase your chances of quitting for good. · Limit alcohol to 2 drinks a day for men and 1 drink a day for women. Too much alcohol can cause health problems. If you have a BMI higher than 25  · Your doctor may do other tests to check your risk for weight-related health problems. This may include measuring the distance around your waist. A waist measurement of more than 40 inches in men or 35 inches in women can increase the risk of weight-related health problems. · Talk with your doctor about steps you can take to stay healthy or improve your health. You may need to make lifestyle changes to lose weight and stay healthy, such as changing your diet and getting regular exercise. If you have a BMI lower than 18.5  · Your doctor may do other tests to check your risk for health problems. · Talk with your doctor about steps you can take to stay healthy or improve your health. You may need to make lifestyle changes to gain or maintain weight and stay healthy, such as getting more healthy foods in your diet and doing exercises to build muscle. Where can you learn more? Go to http://hernando-caitlyn.info/. Enter S176 in the search box to learn more about \"Body Mass Index: Care Instructions. \"  Current as of: October 13, 2016  Content Version: 11.4  © 1317-7739 [employer]wise[employer]. Care instructions adapted under license by [employer] (which disclaims liability or warranty for this information). If you have questions about a medical condition or this instruction, always ask your healthcare professional. [address] any warranty or liability for your use of this information.

## 2018-04-11 NOTE — PROGRESS NOTES
Bariatric Surgery Consultation    Subjective:     Gisela Saul is a 33 y.o. obese female with a Body mass index is 44.87 kg/(m^2). .  she desires surgery at this time because of health issues and quality of life issues. Gisela Saul has been seen by a bariatric nutritionist and has been placed on an appropriate low carbohydrate diet. The patient desires revision of her LGBP that was completed at [facility] 11 years ago for surgical weight loss, however she is here today to review their workup to date. Gisela Saul is here also today to check progress with weight loss / evaluate nutritional status and review all subspecialty clearances in hopes of proceeding to the operating room.      Patient Active Problem List    Diagnosis Date Noted    Morbid obesity with BMI of 40.0-44.9, adult ([address]     Morbid obesity ([address]     Depression     Borderline diabetes     Intestinal malabsorption     Iron deficiency anemia secondary to inadequate dietary iron intake 12/21/2016    B12 deficiency 12/21/2016    Morbid obesity due to excess calories ([address] 07/30/2016    H/O gastric bypass 07/30/2016      Past Surgical History:   Procedure Laterality Date    HX GI      gastric bypass-2007      Social History   Substance Use Topics    Smoking status: Never Smoker    Smokeless tobacco: Never Used    Alcohol use No      Family History   Problem Relation Age of Onset    No Known Problems Mother     No Known Problems Father     No Known Problems Sister     No Known Problems Brother     No Known Problems Maternal Aunt     No Known Problems Maternal Uncle     No Known Problems Paternal Aunt     No Known Problems Paternal Uncle     No Known Problems Maternal Grandmother     No Known Problems Maternal Grandfather     No Known Problems Paternal Grandmother     No Known Problems Paternal Grandfather     No Known Problems Other       Current Outpatient Prescriptions   Medication Sig Dispense Refill    ergocalciferol (ERGOCALCIFEROL) 50,000 unit capsule Take 1 Cap by mouth two (2) times a week for 180 days. 52 Cap 1    FLUoxetine (PROZAC) 20 mg tablet TAKE 1 TABLET BY MOUTH AT BEDTIME  2    clonazePAM (KLONOPIN) 0.5 mg tablet TAKE 2 TABLET BY MOUTH EVERY DAY AS NEEDED PANIC/ANXIETY  0    L-Norgest&E Estradiol-E Estrad 0.15 mg-30 mcg (84)/10 mcg (7) 3MPk TAKE 1 TABLET BY MOUTH DAILY  2    cyanocobalamin 1,000 mcg tablet Take 1,000 mcg by mouth daily.  MULTIVITS,CA,MINERALS/IRON/FA (ONE-A-DAY WOMENS FORMULA PO) Take  by mouth.        No Known Allergies       Review of Systems:            General - No history or complaints of unexpected fever, chills, or weight loss  Head/Neck - No history or complaints of headache, diplopia, dysphagia, hearing loss  Cardiac - No history or complaints of chest pain, palpitations, murmur, or shortness of breath  Pulmonary - No history or complaints of shortness of breath, productive cough, hemoptysis  Gastrointestinal - No history or complaints of reflux,  abdominal pain, obstipation/constipation, blood per rectum  Genitourinary - No history or complaints of hematuria/dysuria, stress urinary incontinence symptoms, or renal lithiasis  Musculoskeletal - No history or complaints of joint pain or muscular weakness  Hematologic - No history or complaints of bleeding disorders, blood transfusions  Neurologic - No history or complaints of  migraine headaches, seizure activity, syncopal episodes, TIA or stroke  Integumentary - No history or complaints of rashes, abnormal nevi, skin cancer  Gynecological - No history of dysmenorrhea or endometriosis           Objective:     Visit Vitals    /71 (BP 1 Location: Left arm, BP Patient Position: Sitting)    Pulse (!) 115    Resp 16    Ht 5' 6\" (1.676 m)    Wt 126.1 kg (278 lb)    SpO2 100%    BMI 44.87 kg/m2       Physical Examination: {male adult master:798237}    Labs:     Recent Results (from the past 2016 hour(s))   FERRITIN Collection Time: 02/28/18 12:55 PM   Result Value Ref Range    Ferritin 9 8 - 518 NG/ML   METABOLIC PANEL, COMPREHENSIVE    Collection Time: 02/28/18 12:55 PM   Result Value Ref Range    Sodium 140 136 - 145 mmol/L    Potassium 4.4 3.5 - 5.5 mmol/L    Chloride 107 100 - 108 mmol/L    CO2 21 21 - 32 mmol/L    Anion gap 12 3.0 - 18 mmol/L    Glucose 95 74 - 99 mg/dL    BUN 14 7.0 - 18 MG/DL    Creatinine 0.74 0.6 - 1.3 MG/DL    BUN/Creatinine ratio 19 12 - 20      GFR est AA >60 >60 ml/min/1.73m2    GFR est non-AA >60 >60 ml/min/1.73m2    Calcium 8.8 8.5 - 10.1 MG/DL    Bilirubin, total 0.3 0.2 - 1.0 MG/DL    ALT (SGPT) 25 13 - 56 U/L    AST (SGOT) 19 15 - 37 U/L    Alk. phosphatase 86 45 - 117 U/L    Protein, total 8.1 6.4 - 8.2 g/dL    Albumin 3.8 3.4 - 5.0 g/dL    Globulin 4.3 (H) 2.0 - 4.0 g/dL    A-G Ratio 0.9 0.8 - 1.7     VITAMIN B1, WHOLE BLOOD    Collection Time: 02/28/18 12:55 PM   Result Value Ref Range    Vitamin B1 137.4 66.5 - 200.0 nmol/L   VITAMIN B12 & FOLATE    Collection Time: 02/28/18 12:55 PM   Result Value Ref Range    Vitamin B12 277 211 - 911 pg/mL    Folate 16.1 3.10 - 17.50 ng/mL   CBC WITH AUTOMATED DIFF    Collection Time: 02/28/18 12:55 PM   Result Value Ref Range    WBC 7.4 4.6 - 13.2 K/uL    RBC 5.22 4.20 - 5.30 M/uL    HGB 14.6 12.0 - 16.0 g/dL    HCT 43.8 35.0 - 45.0 %    MCV 83.9 74.0 - 97.0 FL    MCH 28.0 24.0 - 34.0 PG    MCHC 33.3 31.0 - 37.0 g/dL    RDW 13.3 11.6 - 14.5 %    PLATELET 884 266 - 655 K/uL    MPV 8.7 (L) 9.2 - 11.8 FL    NEUTROPHILS 60 40 - 73 %    LYMPHOCYTES 34 21 - 52 %    MONOCYTES 5 3 - 10 %    EOSINOPHILS 1 0 - 5 %    BASOPHILS 0 0 - 2 %    ABS. NEUTROPHILS 4.4 1.8 - 8.0 K/UL    ABS. LYMPHOCYTES 2.5 0.9 - 3.6 K/UL    ABS. MONOCYTES 0.3 0.05 - 1.2 K/UL    ABS. EOSINOPHILS 0.1 0.0 - 0.4 K/UL    ABS.  BASOPHILS 0.0 0.0 - 0.06 K/UL    DF AUTOMATED     IRON    Collection Time: 02/28/18 12:55 PM   Result Value Ref Range    Iron 84 50 - 175 ug/dL   VITAMIN D, 25 HYDROXY    Collection Time: 02/28/18 12:55 PM   Result Value Ref Range    Vitamin D 25-Hydroxy 16.5 (L) 30 - 100 ng/mL   TSH 3RD GENERATION    Collection Time: 02/28/18 12:55 PM   Result Value Ref Range    TSH 0.87 0.36 - 3.74 uIU/mL   HCG QL SERUM    Collection Time: 03/19/18  3:20 PM   Result Value Ref Range    HCG, Ql. NEGATIVE  NEG     EKG, 12 LEAD, INITIAL    Collection Time: 03/19/18  3:34 PM   Result Value Ref Range    Ventricular Rate 81 BPM    Atrial Rate 81 BPM    P-R Interval 176 ms    QRS Duration 104 ms    Q-T Interval 384 ms    QTC Calculation (Bezet) 446 ms    Calculated P Axis 28 degrees    Calculated R Axis 92 degrees    Calculated T Axis 4 degrees    Diagnosis       Sinus rhythm with marked sinus arrhythmia  Rightward axis  Borderline ECG  No previous ECGs available  Confirmed by Selwyn Harris MD, --- ([id number]) on 3/19/2018 4:58:14 PM     HCG QL SERUM    Collection Time: 03/23/18 12:31 PM   Result Value Ref Range    HCG, Ql. NEGATIVE  NEG             Assessment:     Morbid obesity with associated comorbidity    Plan:     Continuation of Pre-Operative evaluation / clearance. Jevon Cha has returned to the office today to discuss her status as a surgical candidate.  her progress has been noted and reviewed. We will continue the pre-operative process and work towards goals as outlined. she has 0 more pounds to lose before proceeding to the OR.  (gained 3 pounds lost since last visit)  she has 5 more nutritional visits to complete before proceeding to the OR  she has an outstanding Psychologist clearance to review before proceeding to the OR. Jevon Cha understand the rationales for all the above. It has been discussed that given her current condition that the best surgical option for this patient would be the {MLB BARIATRIC SURGERY TIJUI:63653}. Jevon Cha agrees with the surgical choice and has been educated in it's; risks, benefits, and alternatives.   We will continue with the pre-operative evaluation as needed to check progress. Secondary Diagnoses:     Dietary Intervention  - The patient is currently scheduled to see or has been followed by a bariatric nutritionist for an attempt at preoperative weight loss as has been dictated by their insurance carrier. They will be assessed at various times during their follow up to evaluate their progress depending on the length of time that is required once again by their carrier. I have explained the importance of preoperative weight loss and the benefits regarding lower surgical risk and also assisting the patient in reaching their weight loss goal.  Finally they understand their is a physiologic benefit from the standpoint of hepatic volume reduction preoperatively.   I have reiterated the importance of a low carbohydrate and high protein regimen to achieve their stated goal.      Signed By: Clara Fish     April 11, 2018

## 2018-04-11 NOTE — PROGRESS NOTES
Bariatric Surgery Consultation    Subjective:     Derian Castillo is a 33 y.o. obese female with a Body mass index is 44.87 kg/(m^2). .  she desires surgery at this time because of health issues and quality of life issues. Derian Castillo has tried multiple diets in her lifetime most recently tried physician supervised, behavior modification and unsupervised diets  Bariatric comorbidities present are   Patient Active Problem List   Diagnosis Code    Morbid obesity due to excess calories ([address] E66.01    H/O gastric bypass Z98.84    Iron deficiency anemia secondary to inadequate dietary iron intake D50.8    B12 deficiency E53.8    Morbid obesity with BMI of 40.0-44.9, adult ([address] E66.01, Z68.41    Morbid obesity ([address] E66.01    Depression F32.9    Borderline diabetes R73.03    Intestinal malabsorption K90.9    Psychotic disorder F29    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 Derian Castillo is here today to check progress with weight loss / evaluate nutritional status and review all subspecialty clearances in hopes of proceeding to the operating room.      Patient Active Problem List    Diagnosis Date Noted    Psychotic disorder     Dilation of stomach     Morbid obesity with BMI of 40.0-44.9, adult ([address]     Morbid obesity ([address]     Depression     Borderline diabetes     Intestinal malabsorption     Iron deficiency anemia secondary to inadequate dietary iron intake 12/21/2016    B12 deficiency 12/21/2016    Morbid obesity due to excess calories ([address] 07/30/2016    H/O gastric bypass 07/30/2016      Past Surgical History:   Procedure Laterality Date    HX GI      gastric bypass-2007      Social History   Substance Use Topics    Smoking status: Never Smoker    Smokeless tobacco: Never Used    Alcohol use No      Family History Problem Relation Age of Onset    No Known Problems Mother     No Known Problems Father     No Known Problems Sister     No Known Problems Brother     No Known Problems Maternal Aunt     No Known Problems Maternal Uncle     No Known Problems Paternal Aunt     No Known Problems Paternal Uncle     No Known Problems Maternal Grandmother     No Known Problems Maternal Grandfather     No Known Problems Paternal Grandmother     No Known Problems Paternal Grandfather     No Known Problems Other       Current Outpatient Prescriptions   Medication Sig Dispense Refill    ergocalciferol (ERGOCALCIFEROL) 50,000 unit capsule Take 1 Cap by mouth two (2) times a week for 180 days. 52 Cap 1    FLUoxetine (PROZAC) 20 mg tablet TAKE 1 TABLET BY MOUTH AT BEDTIME  2    clonazePAM (KLONOPIN) 0.5 mg tablet TAKE 2 TABLET BY MOUTH EVERY DAY AS NEEDED PANIC/ANXIETY  0    L-Norgest&E Estradiol-E Estrad 0.15 mg-30 mcg (84)/10 mcg (7) 3MPk TAKE 1 TABLET BY MOUTH DAILY  2    cyanocobalamin 1,000 mcg tablet Take 1,000 mcg by mouth daily.  MULTIVITS,CA,MINERALS/IRON/FA (ONE-A-DAY WOMENS FORMULA PO) Take  by mouth.        No Known Allergies       Review of Systems:        General - No history or complaints of unexpected fever, chills, or weight loss  Head/Neck - No history or complaints of headache, diplopia, dysphagia, hearing loss  Cardiac - No history or complaints of chest pain, palpitations, murmur, or shortness of breath  Pulmonary - No history or complaints of shortness of breath, productive cough, hemoptysis  Gastrointestinal - No history or complaints of reflux,  abdominal pain, obstipation/constipation, blood per rectum  Genitourinary - No history or complaints of hematuria/dysuria, stress urinary incontinence symptoms, or renal lithiasis  Musculoskeletal - No history or complaints of joint pain or muscular weakness  Hematologic - No history or complaints of bleeding disorders, blood transfusions, sickle cell anemia  Neurologic - No history or complaints of  migraine headaches, seizure activity, syncopal episodes, TIA or stroke  Integumentary - No history or complaints of rashes, abnormal nevi, skin cancer  Gynecological - No history of heavy menses/abnormal menses    Objective:     Visit Vitals    /71 (BP 1 Location: Left arm, BP Patient Position: Sitting)    Pulse (!) 115    Resp 16    Ht 5' 6\" (1.676 m)    Wt 126.1 kg (278 lb)    SpO2 100%    BMI 44.87 kg/m2       Physical Examination: General appearance - alert, well appearing, and in no distress  Mental status - alert, oriented to person, place, and time  Eyes - pupils equal and reactive, extraocular eye movements intact  Ears - bilateral TM's and external ear canals normal  Nose - normal and patent, no erythema, discharge or polyps  Mouth - mucous membranes moist, pharynx normal without lesions  Neck - supple, no significant adenopathy  Lymphatics - no palpable lymphadenopathy, no hepatosplenomegaly  Chest - clear to auscultation, no wheezes, rales or rhonchi, symmetric air entry  Heart - normal rate, regular rhythm, normal S1, S2, no murmurs, rubs, clicks or gallops  Abdomen - soft, nontender, nondistended, no masses or organomegaly  Back exam - full range of motion, no tenderness, palpable spasm or pain on motion  Neurological - alert, oriented, normal speech, no focal findings or movement disorder noted  Musculoskeletal - no joint tenderness, deformity or swelling  Extremities - peripheral pulses normal, no pedal edema, no clubbing or cyanosis  Skin - normal coloration and turgor, no rashes, no suspicious skin lesions noted    Labs:             Assessment:     Morbid obesity with associated comorbidity & 11 years post [facility] gastric bypass with diagnostic studies (UGI and EGD) to suggest a larger than average pouch. Most recent study was a radiopaque cottage cheese UGI as described below.     Examination: Upper GI with KUB.     HISTORY: 33year-old patient with prior gastric bypass surgical procedure.     Fluoroscopic time: 1 minute 48 seconds  Fluoroscopic dose (reference air kerma): 267.836 mGy     TECHNIQUE: Initially, a single contrast upper GI exam was performed in the usual  fashion, a 13 mm barium tablet was given. Subsequently, the patient was given a  meal consisting of 6 ounces of barium mixed with 6 ounces of cottage cheese. The  patient ingested approximately 6 ounces of the mixture. AP and lateral overhead  images were obtained immediately after ingestion of the meal.     FINDINGS:     Initial  projection demonstrates a nonobstructive and nonspecific bowel gas  pattern. Ingestion of oral contrast demonstrates prompt opacification of the  Maria Luz limb, which is of normal caliber. There is additional opacification of the  gastric pouch. The barium pill is noted to be initially present within the  gastric pouch, without discrete translocation through the Maria Luz limb. Estimated  size of the gastric pouch somewhat limited secondary to underdistention  following the barium meal, with the pouch estimated at 7.2 x 3.9 x 2.5 cm in  size; with a volume calculated to be 33.5 mL.     IMPRESSION  IMPRESSION:  1. Postoperative changes from gastric bypass surgical procedure, with estimated  gastric pouch volume as above. Plan:     Continuation of Pre-Operative evaluation / clearance. Micaela Love has returned to the office today to discuss her status as a surgical candidate.  her progress has been noted and reviewed. We will continue the pre-operative process and work towards goals as outlined. she has NA more pounds to lose before proceeding to the OR. (4 pounds gained since initial consult visit 1.5 months ago)  she has several more nutritional visits to complete before proceeding to the OR  she has an outstanding NA clearance to review before proceeding to the OR. Micaela Love understand the rationales for all the above.   It has been discussed that given her post gastric bypass with larger than average condition that the best surgical option for this patient would be the possible revision. Jevon Cha agrees with the surgical choice and has been educated in it's; risks, benefits, and alternatives. We will continue with the pre-operative evaluation as needed to check progress. The patient understands the plan of action    Future plan for this patient would be to proceed with possible revision of her gastric bypass being that I feel that with all the given studies that her pouch is actually big enough to be revised. I question the radiology reading of the cottage cheese UGI and feel like her pouch is bigger than suggested. We will proceed to the OR with plans for a bypass revision. If we decide that she is not a candidate for a formal revision then she wishes to proceed with a restrictive gastric band around her existing gastric pouch. Secondary Diagnoses:     Dietary Intervention  - The patient is currently scheduled to see or has been followed by a bariatric nutritionist for an attempt at preoperative weight loss as has been dictated by their insurance carrier. They will be assessed at various times during their follow up to evaluate their progress depending on the length of time that is required once again by their carrier. I have explained the importance of preoperative weight loss and the benefits regarding lower surgical risk and also assisting the patient in reaching their weight loss goal.  Finally they understand their is a physiologic benefit from the standpoint of hepatic volume reduction preoperatively.   I have reiterated the importance of a low carbohydrate and high protein regimen to achieve their stated goal.      Signed By: Sarah Tubbs MD     April 11, 2018

## 2018-05-01 DIAGNOSIS — Z98.84 BARIATRIC SURGERY STATUS: ICD-10-CM

## 2018-05-01 DIAGNOSIS — E66.01 MORBID OBESITY DUE TO EXCESS CALORIES (HCC): Primary | ICD-10-CM

## 2018-05-01 DIAGNOSIS — Z01.812 BLOOD TESTS PRIOR TO TREATMENT OR PROCEDURE: ICD-10-CM

## 2018-05-07 ENCOUNTER — DOCUMENTATION ONLY (OUTPATIENT)
Dept: SURGERY | Age: 28
End: 2018-05-07

## 2018-05-07 ENCOUNTER — OFFICE VISIT (OUTPATIENT)
Dept: SURGERY | Age: 28
End: 2018-05-07

## 2018-05-07 ENCOUNTER — HOSPITAL ENCOUNTER (OUTPATIENT)
Dept: PREADMISSION TESTING | Age: 28
Discharge: HOME | End: 2018-05-07
Attending: SPECIALIST
Payer: COMMERCIAL

## 2018-05-07 VITALS
DIASTOLIC BLOOD PRESSURE: 84 MMHG | HEART RATE: 64 BPM | HEIGHT: 66 IN | WEIGHT: 276.9 LBS | BODY MASS INDEX: 44.5 KG/M2 | RESPIRATION RATE: 16 BRPM | OXYGEN SATURATION: 98 % | SYSTOLIC BLOOD PRESSURE: 147 MMHG

## 2018-05-07 DIAGNOSIS — K90.9 INTESTINAL MALABSORPTION, UNSPECIFIED TYPE: ICD-10-CM

## 2018-05-07 DIAGNOSIS — E66.01 MORBID OBESITY WITH BMI OF 40.0-44.9, ADULT (HCC): Primary | ICD-10-CM

## 2018-05-07 DIAGNOSIS — E53.8 B12 DEFICIENCY: ICD-10-CM

## 2018-05-07 DIAGNOSIS — R73.03 BORDERLINE DIABETES: ICD-10-CM

## 2018-05-07 DIAGNOSIS — K31.89 DILATION OF STOMACH: ICD-10-CM

## 2018-05-07 DIAGNOSIS — G89.18 POSTOPERATIVE PAIN: Primary | ICD-10-CM

## 2018-05-07 DIAGNOSIS — Z98.84 H/O GASTRIC BYPASS: ICD-10-CM

## 2018-05-07 DIAGNOSIS — D50.8 IRON DEFICIENCY ANEMIA SECONDARY TO INADEQUATE DIETARY IRON INTAKE: ICD-10-CM

## 2018-05-07 DIAGNOSIS — E66.01 MORBID OBESITY (HCC): ICD-10-CM

## 2018-05-07 LAB
ABO + RH BLD: NORMAL
ALBUMIN SERPL-MCNC: 3.5 G/DL (ref 3.4–5)
ALBUMIN/GLOB SERPL: 0.8 {RATIO} (ref 0.8–1.7)
ALP SERPL-CCNC: 86 U/L (ref 45–117)
ALT SERPL-CCNC: 21 U/L (ref 13–56)
ANION GAP SERPL CALC-SCNC: 12 MMOL/L (ref 3–18)
AST SERPL-CCNC: 14 U/L (ref 15–37)
ATRIAL RATE: 76 BPM
BASOPHILS # BLD: 0 K/UL (ref 0–0.06)
BASOPHILS NFR BLD: 0 % (ref 0–2)
BILIRUB SERPL-MCNC: 0.3 MG/DL (ref 0.2–1)
BLOOD GROUP ANTIBODIES SERPL: NORMAL
BUN SERPL-MCNC: 12 MG/DL (ref 7–18)
BUN/CREAT SERPL: 15 (ref 12–20)
CALCIUM SERPL-MCNC: 8.6 MG/DL (ref 8.5–10.1)
CALCULATED P AXIS, ECG09: 64 DEGREES
CALCULATED R AXIS, ECG10: 103 DEGREES
CALCULATED T AXIS, ECG11: 42 DEGREES
CHLORIDE SERPL-SCNC: 105 MMOL/L (ref 100–108)
CO2 SERPL-SCNC: 23 MMOL/L (ref 21–32)
CREAT SERPL-MCNC: 0.78 MG/DL (ref 0.6–1.3)
DIAGNOSIS, 93000: NORMAL
DIFFERENTIAL METHOD BLD: ABNORMAL
EOSINOPHIL # BLD: 0.1 K/UL (ref 0–0.4)
EOSINOPHIL NFR BLD: 1 % (ref 0–5)
ERYTHROCYTE [DISTWIDTH] IN BLOOD BY AUTOMATED COUNT: 13.4 % (ref 11.6–14.5)
GLOBULIN SER CALC-MCNC: 4.3 G/DL (ref 2–4)
GLUCOSE SERPL-MCNC: 92 MG/DL (ref 74–99)
HCT VFR BLD AUTO: 41.3 % (ref 35–45)
HGB BLD-MCNC: 13.4 G/DL (ref 12–16)
LYMPHOCYTES # BLD: 2.9 K/UL (ref 0.9–3.6)
LYMPHOCYTES NFR BLD: 36 % (ref 21–52)
MCH RBC QN AUTO: 27.3 PG (ref 24–34)
MCHC RBC AUTO-ENTMCNC: 32.4 G/DL (ref 31–37)
MCV RBC AUTO: 84.1 FL (ref 74–97)
MONOCYTES # BLD: 0.3 K/UL (ref 0.05–1.2)
MONOCYTES NFR BLD: 4 % (ref 3–10)
NEUTS SEG # BLD: 4.9 K/UL (ref 1.8–8)
NEUTS SEG NFR BLD: 59 % (ref 40–73)
P-R INTERVAL, ECG05: 164 MS
PLATELET # BLD AUTO: 270 K/UL (ref 135–420)
PMV BLD AUTO: 8.7 FL (ref 9.2–11.8)
POTASSIUM SERPL-SCNC: 4.1 MMOL/L (ref 3.5–5.5)
PROT SERPL-MCNC: 7.8 G/DL (ref 6.4–8.2)
Q-T INTERVAL, ECG07: 398 MS
QRS DURATION, ECG06: 100 MS
QTC CALCULATION (BEZET), ECG08: 447 MS
RBC # BLD AUTO: 4.91 M/UL (ref 4.2–5.3)
SODIUM SERPL-SCNC: 140 MMOL/L (ref 136–145)
SPECIMEN EXP DATE BLD: NORMAL
VENTRICULAR RATE, ECG03: 76 BPM
WBC # BLD AUTO: 8.2 K/UL (ref 4.6–13.2)

## 2018-05-07 PROCEDURE — 80053 COMPREHEN METABOLIC PANEL: CPT | Performed by: SPECIALIST

## 2018-05-07 PROCEDURE — 86900 BLOOD TYPING SEROLOGIC ABO: CPT | Performed by: SPECIALIST

## 2018-05-07 PROCEDURE — 93005 ELECTROCARDIOGRAM TRACING: CPT

## 2018-05-07 PROCEDURE — 36415 COLL VENOUS BLD VENIPUNCTURE: CPT | Performed by: SPECIALIST

## 2018-05-07 PROCEDURE — 85025 COMPLETE CBC W/AUTO DIFF WBC: CPT | Performed by: SPECIALIST

## 2018-05-07 RX ORDER — OXYCODONE AND ACETAMINOPHEN 5; 325 MG/1; MG/1
1 TABLET ORAL
Qty: 30 TAB | Refills: 0 | Status: ON HOLD | OUTPATIENT
Start: 2018-05-07 | End: 2018-05-30

## 2018-05-07 RX ORDER — OMEPRAZOLE 20 MG/1
20 CAPSULE, DELAYED RELEASE ORAL DAILY
Qty: 30 CAP | Refills: 2 | Status: SHIPPED | OUTPATIENT
Start: 2018-05-07 | End: 2018-06-06

## 2018-05-07 RX ORDER — FLUOXETINE HYDROCHLORIDE 40 MG/1
60 CAPSULE ORAL
COMMUNITY
End: 2019-05-09 | Stop reason: ALTCHOICE

## 2018-05-07 NOTE — PATIENT INSTRUCTIONS
Body Mass Index: Care Instructions  Your Care Instructions    Body mass index (BMI) can help you see if your weight is raising your risk for health problems. It uses a formula to compare how much you weigh with how tall you are. · A BMI lower than 18.5 is considered underweight. · A BMI between 18.5 and 24.9 is considered healthy. · A BMI between 25 and 29.9 is considered overweight. A BMI of 30 or higher is considered obese. If your BMI is in the normal range, it means that you have a lower risk for weight-related health problems. If your BMI is in the overweight or obese range, you may be at increased risk for weight-related health problems, such as high blood pressure, heart disease, stroke, arthritis or joint pain, and diabetes. If your BMI is in the underweight range, you may be at increased risk for health problems such as fatigue, lower protection (immunity) against illness, muscle loss, bone loss, hair loss, and hormone problems. BMI is just one measure of your risk for weight-related health problems. You may be at higher risk for health problems if you are not active, you eat an unhealthy diet, or you drink too much alcohol or use tobacco products. Follow-up care is a key part of your treatment and safety. Be sure to make and go to all appointments, and call your doctor if you are having problems. It's also a good idea to know your test results and keep a list of the medicines you take. How can you care for yourself at home? · Practice healthy eating habits. This includes eating plenty of fruits, vegetables, whole grains, lean protein, and low-fat dairy. · If your doctor recommends it, get more exercise. Walking is a good choice. Bit by bit, increase the amount you walk every day. Try for at least 30 minutes on most days of the week. · Do not smoke. Smoking can increase your risk for health problems. If you need help quitting, talk to your doctor about stop-smoking programs and medicines. These can increase your chances of quitting for good. · Limit alcohol to 2 drinks a day for men and 1 drink a day for women. Too much alcohol can cause health problems. If you have a BMI higher than 25  · Your doctor may do other tests to check your risk for weight-related health problems. This may include measuring the distance around your waist. A waist measurement of more than 40 inches in men or 35 inches in women can increase the risk of weight-related health problems. · Talk with your doctor about steps you can take to stay healthy or improve your health. You may need to make lifestyle changes to lose weight and stay healthy, such as changing your diet and getting regular exercise. If you have a BMI lower than 18.5  · Your doctor may do other tests to check your risk for health problems. · Talk with your doctor about steps you can take to stay healthy or improve your health. You may need to make lifestyle changes to gain or maintain weight and stay healthy, such as getting more healthy foods in your diet and doing exercises to build muscle. Where can you learn more? Go to http://hernando-caitlyn.info/. Enter S176 in the search box to learn more about \"Body Mass Index: Care Instructions. \"  Current as of: October 13, 2016  Content Version: 11.4  © 7255-7399 Healthwise, Incorporated. Care instructions adapted under license by [employer] (which disclaims liability or warranty for this information). If you have questions about a medical condition or this instruction, always ask your healthcare professional. [address] any warranty or liability for your use of this information.

## 2018-05-07 NOTE — PROGRESS NOTES
Appears to have a good understanding of the diet progression, food choices, and dietary/exercise habits for successful weight loss and nourishment after surgery. The class material included: post-op diet progression, including liquid, pureed, and low fat, low sugar food recommendations; proper food group choices, and encouraging dietary and exercise habits that lead to weight loss success.      Amelia Walters RD

## 2018-05-07 NOTE — MR AVS SNAPSHOT
[facility] 
 
 
 [facility] [address] [facility] 
[phone] Patient: Seymour Dia MRN: [id number] :1990 Visit Information Date & Time Provider Department Dept. Phone Encounter #  
 2018  2:40 PM MD Marcio [facility] [phone] [id number] Your Appointments 2018  2:40 PM  
PRE OP with MD Marcio [facility] ([facility]) Appt Note: GB revision 5-15-  
 [address] [address] [address]  
[phone]  
  
   
 [address] [place] [address]  
  
    
 2018  1:00 PM  
POST OP with LEILA Felton [facility] ([facility]) Appt Note: 2 wk po  
 [address] [place] [address] [facility] [address]  
[phone]  
  
   
 Psychiatric [address]  
  
    
 2018 10:30 AM  
Office Visit with EUN Lord [facility] ([facility]) Appt Note: 1 mo po  
 [address] [place] [address] [facility] [address]  
[phone]  
  
   
 [address] [place] [address]  
  
    
 2018 11:00 AM  
Office Visit with TSS NUTRI VISIT PEN Marcio Win [facility] ([facility]) Appt Note: 1 mo po  
 [address] [place] [address] [facility] [address]  
  
   
 [address] [place] [address] Upcoming Health Maintenance Date Due  
 HPV Age 9Y-34Y (1 of 1 - Female 3 Dose Series) 10/22/2002 DTaP/Tdap/Td series (1 - Tdap) 10/22/2012 PAP AKA CERVICAL CYTOLOGY 10/22/2012 Influenza Age 5 to Adult 2018 Allergies as of 2018  Review Complete On: 2018 By: Ilda Veelz MD  
 No Known Allergies Current Immunizations  Never Reviewed No immunizations on file. Not reviewed this visit You Were Diagnosed With   
  
 Codes Comments Morbid obesity with BMI of 40.0-44.9, adult ([facility]    -  Primary ICD-10-CM: E66.01, Z68.41 
ICD-9-CM: 278.01, V85.41 Morbid obesity ([facility]     ICD-10-CM: E66.01 
ICD-9-CM: 278.01 Intestinal malabsorption, unspecified type     ICD-10-CM: K90.9 ICD-9-CM: 579.9 H/O gastric bypass     ICD-10-CM: Z98.84 ICD-9-CM: V45.86 Dilation of stomach     ICD-10-CM: K31.89 ICD-9-CM: 536.8 B12 deficiency     ICD-10-CM: E53.8 ICD-9-CM: 266.2 Iron deficiency anemia secondary to inadequate dietary iron intake     ICD-10-CM: D50.8 ICD-9-CM: 280.1 Borderline diabetes     ICD-10-CM: R73.03 
ICD-9-CM: 790.29 Vitals BP Pulse Resp Height(growth percentile) Weight(growth percentile) SpO2  
 147/84 (BP 1 Location: Other(comment), BP Patient Position: Sitting) 64 16 5' 6\" (1.676 m) 276 lb 14.4 oz (125.6 kg) 98% BMI OB Status Smoking Status 44.69 kg/m2 Having regular periods Never Smoker Vitals History BMI and BSA Data Body Mass Index Body Surface Area  
 44.69 kg/m 2 2.42 m 2 Preferred Pharmacy Pharmacy Name Phone [facility] [id number] [address] Your Updated Medication List  
  
   
This list is accurate as of 5/7/18  2:39 PM.  Always use your most recent med list.  
  
  
  
  
 clonazePAM 0.5 mg tablet Commonly known as:  KlonoPIN  
TAKE 2 TABLET BY MOUTH EVERY DAY AS NEEDED PANIC/ANXIETY  
  
 cyanocobalamin 1,000 mcg tablet Take 1,000 mcg by mouth daily. ergocalciferol 50,000 unit capsule Commonly known as:  ERGOCALCIFEROL Take 1 Cap by mouth two (2) times a week for 180 days. * PROzac 40 mg capsule Generic drug:  FLUoxetine Take 60 mg by mouth daily. * FLUoxetine 20 mg tablet Commonly known as:  PROzac  
 TAKE 1 TABLET BY MOUTH AT BEDTIME  
  
 L-Norgest&E Estradiol-E Estrad 0.15 mg-30 mcg (84)/10 mcg (7) 3mpk TAKE 1 TABLET BY MOUTH DAILY  
  
 omeprazole 20 mg capsule Commonly known as:  PRILOSEC Take 1 Cap by mouth daily for 30 days. ONE-A-DAY WOMENS FORMULA PO Take  by mouth. oxyCODONE-acetaminophen 5-325 mg per tablet Commonly known as:  PERCOCET Take 1 Tab by mouth every four (4) hours as needed for Pain. Max Daily Amount: 6 Tabs. * Notice: This list has 2 medication(s) that are the same as other medications prescribed for you. Read the directions carefully, and ask your doctor or other care provider to review them with you. To-Do List   
 05/09/2018 4:30 PM  
  Appointment with [facility] PAT ROOM P2 at [address] [place] [address] ([phone]) Patient Instructions Body Mass Index: Care Instructions Your Care Instructions Body mass index (BMI) can help you see if your weight is raising your risk for health problems. It uses a formula to compare how much you weigh with how tall you are. · A BMI lower than 18.5 is considered underweight. · A BMI between 18.5 and 24.9 is considered healthy. · A BMI between 25 and 29.9 is considered overweight. A BMI of 30 or higher is considered obese. If your BMI is in the normal range, it means that you have a lower risk for weight-related health problems. If your BMI is in the overweight or obese range, you may be at increased risk for weight-related health problems, such as high blood pressure, heart disease, stroke, arthritis or joint pain, and diabetes. If your BMI is in the underweight range, you may be at increased risk for health problems such as fatigue, lower protection (immunity) against illness, muscle loss, bone loss, hair loss, and hormone problems. BMI is just one measure of your risk for weight-related health problems.  You may be at higher risk for health problems if you are not active, you eat an unhealthy diet, or you drink too much alcohol or use tobacco products. Follow-up care is a key part of your treatment and safety. Be sure to make and go to all appointments, and call your doctor if you are having problems. It's also a good idea to know your test results and keep a list of the medicines you take. How can you care for yourself at home? · Practice healthy eating habits. This includes eating plenty of fruits, vegetables, whole grains, lean protein, and low-fat dairy. · If your doctor recommends it, get more exercise. Walking is a good choice. Bit by bit, increase the amount you walk every day. Try for at least 30 minutes on most days of the week. · Do not smoke. Smoking can increase your risk for health problems. If you need help quitting, talk to your doctor about stop-smoking programs and medicines. These can increase your chances of quitting for good. · Limit alcohol to 2 drinks a day for men and 1 drink a day for women. Too much alcohol can cause health problems. If you have a BMI higher than 25 · Your doctor may do other tests to check your risk for weight-related health problems. This may include measuring the distance around your waist. A waist measurement of more than 40 inches in men or 35 inches in women can increase the risk of weight-related health problems. · Talk with your doctor about steps you can take to stay healthy or improve your health. You may need to make lifestyle changes to lose weight and stay healthy, such as changing your diet and getting regular exercise. If you have a BMI lower than 18.5 · Your doctor may do other tests to check your risk for health problems. · Talk with your doctor about steps you can take to stay healthy or improve your health. You may need to make lifestyle changes to gain or maintain weight and stay healthy, such as getting more healthy foods in your diet and doing exercises to build muscle. Where can you learn more? Go to http://hernando-caitlyn.info/. Enter S176 in the search box to learn more about \"Body Mass Index: Care Instructions. \" Current as of: October 13, 2016 Content Version: 11.4 © 2265-3699 [employer]. Care instructions adapted under license by [employer] (which disclaims liability or warranty for this information). If you have questions about a medical condition or this instruction, always ask your healthcare professional. [address] any warranty or liability for your use of this information. Introducing [facility]! Dear Billie Thapa: Thank you for requesting a [facility] account. Our records indicate that you already have an active [facility] account. You can access your account anytime at [email or web address] Did you know that you can access your hospital and ER discharge instructions at any time in [facility]? You can also review all of your test results from your hospital stay or ER visit. Additional Information If you have questions, please visit the Frequently Asked Questions section of the [facility] website at [email or web address] Remember, [facility] is NOT to be used for urgent needs. For medical emergencies, dial 911. Now available from your iPhone and Android! Please provide this summary of care documentation to your next provider. Your primary care clinician is listed as Phys Other. If you have any questions after today's visit, please call [phone].

## 2018-05-07 NOTE — PROGRESS NOTES
Gastric Bypass Revision - History and Physical    Subjective: The patient is a 33 y.o. obese female with a Body mass index is 44.69 kg/(m^2). .   she presents now to review their work up to date to see if they are a candidate for surgery and whether or not to proceed with the previously requested procedure. Morbid obesity with associated comorbidity & 11 years post [facility] gastric bypass with diagnostic studies (UGI and EGD) to suggest a larger than average pouch probably amendable to revision procedure . Bariatric comorbidities continue to include:   Patient Active Problem List   Diagnosis Code    Morbid obesity due to excess calories ([address] E66.01    H/O gastric bypass Z98.84    Iron deficiency anemia secondary to inadequate dietary iron intake D50.8    B12 deficiency E53.8    Morbid obesity with BMI of 40.0-44.9, adult (HCC) E66.01, Z68.41    Morbid obesity (HCC) E66.01    Depression F32.9    Borderline diabetes R73.03    Intestinal malabsorption K90.9    Psychotic disorder F29    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       They have been generally well prior to this visit and have had no recent significant illnesses. The patient has had no gastrointestinal issues that would preclude them from proceeding with the surgery they have chosen. Juana Beckman has recently tried a preoperative weight loss program  in addition to seeing a bariatric nutritionist preoperatively. We have discussed on at least one other occasion about the various types of surgical weight loss procedures and they have considered these options after our initial consultation. We have once again discussed these procedures in detail and they have now decided on a surgical procedure. They present today to discuss this and confirm that their evaluation pre operatively is acceptable to continue with surgery. The patient desires revision of prior laparoscopic gastric bypass surgery for surgical weight loss.       The patients goal weight is 167 lb. (this represents a BMI of 27)    These goals are consistent with expected outcomes of their desired operation. her Medical goals are resolution of these health issues. Patient Active Problem List    Diagnosis Date Noted    Psychotic disorder     Dilation of stomach     Morbid obesity with BMI of 40.0-44.9, adult ([address]     Morbid obesity ([address]     Depression     Borderline diabetes     Intestinal malabsorption     Iron deficiency anemia secondary to inadequate dietary iron intake 12/21/2016    B12 deficiency 12/21/2016    Morbid obesity due to excess calories ([address] 07/30/2016    H/O gastric bypass 07/30/2016      Past Surgical History:   Procedure Laterality Date    HX GI      gastric bypass-2007      Social History   Substance Use Topics    Smoking status: Never Smoker    Smokeless tobacco: Never Used    Alcohol use No      Family History   Problem Relation Age of Onset    No Known Problems Mother     No Known Problems Father     No Known Problems Sister     No Known Problems Brother     No Known Problems Maternal Aunt     No Known Problems Maternal Uncle     No Known Problems Paternal Aunt     No Known Problems Paternal Uncle     No Known Problems Maternal Grandmother     No Known Problems Maternal Grandfather     No Known Problems Paternal Grandmother     No Known Problems Paternal Grandfather     No Known Problems Other       Current Outpatient Prescriptions   Medication Sig Dispense Refill    omeprazole (PRILOSEC) 20 mg capsule Take 1 Cap by mouth daily for 30 days. 30 Cap 2    FLUoxetine (PROZAC) 40 mg capsule Take 60 mg by mouth daily.  ergocalciferol (ERGOCALCIFEROL) 50,000 unit capsule Take 1 Cap by mouth two (2) times a week for 180 days.  52 Cap 1    FLUoxetine (PROZAC) 20 mg tablet TAKE 1 TABLET BY MOUTH AT BEDTIME  2    clonazePAM (KLONOPIN) 0.5 mg tablet TAKE 2 TABLET BY MOUTH EVERY DAY AS NEEDED PANIC/ANXIETY  0    L-Norgest&E Estradiol-E Estrad 0.15 mg-30 mcg (84)/10 mcg (7) 3MPk TAKE 1 TABLET BY MOUTH DAILY  2    cyanocobalamin 1,000 mcg tablet Take 1,000 mcg by mouth daily.  MULTIVITS,CA,MINERALS/IRON/FA (ONE-A-DAY WOMENS FORMULA PO) Take  by mouth.  oxyCODONE-acetaminophen (PERCOCET) 5-325 mg per tablet Take 1 Tab by mouth every four (4) hours as needed for Pain. Max Daily Amount: 6 Tabs.  30 Tab 0     No Known Allergies       Review of Systems:        General - No history or complaints of unexpected fever, chills, or weight loss  Head/Neck - No history or complaints of headache, diplopia, dysphagia, hearing loss  Cardiac - No history or complaints of chest pain, palpitations, murmur, or shortness of breath  Pulmonary - No history or complaints of shortness of breath, productive cough, hemoptysis  Gastrointestinal - No history or complaints of reflux,  abdominal pain, obstipation/constipation, blood per rectum  Genitourinary - No history or complaints of hematuria/dysuria, stress urinary incontinence symptoms, or renal lithiasis  Musculoskeletal - No history or complaints of joint pain or muscular weakness  Hematologic - No history or complaints of bleeding disorders, blood transfusions, sickle cell anemia  Neurologic - No history or complaints of  migraine headaches, seizure activity, syncopal episodes, TIA or stroke  Integumentary - No history or complaints of rashes, abnormal nevi, skin cancer  Gynecological - No history of heavy menses/abnormal menses    Objective:     Visit Vitals    /84 (BP 1 Location: Other(comment), BP Patient Position: Sitting)  Comment (BP 1 Location): left forearm    Pulse 64    Resp 16    Ht 5' 6\" (1.676 m)    Wt 125.6 kg (276 lb 14.4 oz)    SpO2 98%    BMI 44.69 kg/m2       Physical Examination: General appearance - alert, well appearing, and in no distress  Mental status - alert, oriented to person, place, and time  Eyes - pupils equal and reactive, extraocular eye movements intact  Ears - bilateral TM's and external ear canals normal  Nose - normal and patent, no erythema, discharge or polyps  Mouth - mucous membranes moist, pharynx normal without lesions  Neck - supple, no significant adenopathy  Lymphatics - no palpable lymphadenopathy, no hepatosplenomegaly  Chest - clear to auscultation, no wheezes, rales or rhonchi, symmetric air entry  Heart - normal rate, regular rhythm, normal S1, S2, no murmurs, rubs, clicks or gallops  Abdomen - soft, nontender, nondistended, no masses or organomegaly  Back exam - full range of motion, no tenderness, palpable spasm or pain on motion  Neurological - alert, oriented, normal speech, no focal findings or movement disorder noted  Musculoskeletal - no joint tenderness, deformity or swelling  Extremities - peripheral pulses normal, no pedal edema, no clubbing or cyanosis  Skin - normal coloration and turgor, no rashes, no suspicious skin lesions noted    Labs / Preoperative Evaluations:     Recent Results (from the past 1008 hour(s))   EKG, 12 LEAD, INITIAL    Collection Time: 05/07/18 12:20 PM   Result Value Ref Range    Ventricular Rate 76 BPM    Atrial Rate 76 BPM    P-R Interval 164 ms    QRS Duration 100 ms    Q-T Interval 398 ms    QTC Calculation (Bezet) 447 ms    Calculated P Axis 64 degrees    Calculated R Axis 103 degrees    Calculated T Axis 42 degrees    Diagnosis       Normal sinus rhythm  Rightward axis  Borderline ECG  When compared with ECG of 19-MAR-2018 15:34,  Nonspecific T wave abnormality has replaced inverted T waves in Inferior   leads  Confirmed by Donny Molina MD. ([id number]) on 5/7/2018 12:30:16 PM  Also confirmed by Donny Molina MD. ([id number]),  Osborn Aase ([id number])    on 5/7/2018 1:19:38 PM     CBC WITH AUTOMATED DIFF    Collection Time: 05/07/18 12:29 PM   Result Value Ref Range    WBC 8.2 4.6 - 13.2 K/uL    RBC 4.91 4.20 - 5.30 M/uL    HGB 13.4 12.0 - 16.0 g/dL    HCT 41.3 35.0 - 45.0 %    MCV 84.1 74.0 - 97.0 FL    MCH 27.3 24.0 - 34.0 PG MCHC 32.4 31.0 - 37.0 g/dL    RDW 13.4 11.6 - 14.5 %    PLATELET 361 094 - 482 K/uL    MPV 8.7 (L) 9.2 - 11.8 FL    NEUTROPHILS 59 40 - 73 %    LYMPHOCYTES 36 21 - 52 %    MONOCYTES 4 3 - 10 %    EOSINOPHILS 1 0 - 5 %    BASOPHILS 0 0 - 2 %    ABS. NEUTROPHILS 4.9 1.8 - 8.0 K/UL    ABS. LYMPHOCYTES 2.9 0.9 - 3.6 K/UL    ABS. MONOCYTES 0.3 0.05 - 1.2 K/UL    ABS. EOSINOPHILS 0.1 0.0 - 0.4 K/UL    ABS. BASOPHILS 0.0 0.0 - 0.06 K/UL    DF AUTOMATED     METABOLIC PANEL, COMPREHENSIVE    Collection Time: 05/07/18 12:29 PM   Result Value Ref Range    Sodium 140 136 - 145 mmol/L    Potassium 4.1 3.5 - 5.5 mmol/L    Chloride 105 100 - 108 mmol/L    CO2 23 21 - 32 mmol/L    Anion gap 12 3.0 - 18 mmol/L    Glucose 92 74 - 99 mg/dL    BUN 12 7.0 - 18 MG/DL    Creatinine 0.78 0.6 - 1.3 MG/DL    BUN/Creatinine ratio 15 12 - 20      GFR est AA >60 >60 ml/min/1.73m2    GFR est non-AA >60 >60 ml/min/1.73m2    Calcium 8.6 8.5 - 10.1 MG/DL    Bilirubin, total 0.3 0.2 - 1.0 MG/DL    ALT (SGPT) 21 13 - 56 U/L    AST (SGOT) 14 (L) 15 - 37 U/L    Alk. phosphatase 86 45 - 117 U/L    Protein, total 7.8 6.4 - 8.2 g/dL    Albumin 3.5 3.4 - 5.0 g/dL    Globulin 4.3 (H) 2.0 - 4.0 g/dL    A-G Ratio 0.8 0.8 - 1.7         Assessment:     Morbid obesity with associated comorbidity    Plan:     laparoscopic gastric bypass surgery revision    This is a 33 y.o. female with a BMI of Body mass index is 44.69 kg/(m^2). and the weight-related co-morbidties as noted above. Ryann meets the NIH criteria for bariatric surgery based upon the BMI of Body mass index is 44.69 kg/(m^2). and multiple weight-related co-morbidties.  Austenfarooq Rancho has elected laparoscopic gastric bypass as her intervention of choice for treatment of morbid obestiy through surgical means secondary to its uniform results,  profound baseline suppression of hunger and pace at which weight is lost.    In the office today, following Ryann's history and physical examination, a 40 minute discussion regarding the anatomic alterations for the laparoscopic gastric bypass  was undertaken. The dietary expectations and the patient  dependent factors for success were thoroughly discussed, to include the need for interval follow-up and long-term dietary changes associated with success. The possible short and long term  complications of the gastric bypass were also discussed, to include but not limited to;death, DVT/PE, staple line leak, bleeding, stricture formation, infection,internal hernia  and pouch dilation. Specific weight related outcomes for success were also discussed with an emphasis on careful and close follow-up with the first year and Dietary behavior modification over the first years as baseline cyclical hunger returns  The patient expressed an understanding of the above factors, and her questions were answered in their entirety. In addition, the patient attended a 1.5 hour power point seminar regarding obesity, surgical weight loss including, adjustable gastric band, gastric bypass, and sleeve gastrectomy. This discussion contrasted the different surgical techniques, mechanisms of actions and expected outcomes, and surgical and medical risks associated with each procedure. During this seminar, there was a long question and answer session where each questions was answered until there were no additional questions. Today, the patient had all of her questions answered and the decision was made today that the patient's preoperative evaluation is acceptable for them  to proceed with bariatric surgery  choosing adjustable gastric bypass as her surgical option. The patient understands the plan of action    Plan is to evaluate her anatomy in vivo and try and revise all aspects of her procedure with reduction of her gastric pouch, lengthening of her alethea limb and creation of a new anastomosis if feasible.   She understands that revision surgery is higher risk than a first time procedure. Secondary Diagnoses:     DVT / Pulmonary Embolus Risk - The patient is at a higher risk for post operative DVT / pulmonary embolus secondary to their morbid obese status, relative sedentary lifestyle, and impending general anesthetic. We will plan to use anticoagulation therapy pre and post operative as well as  pneumatic compression devices and encourage ambulation once on the hospital nursing floor. The need for possible at home anticoagulation therapy has also been discussed and any decision on this matter will be made during post operative evaluations. The patient understands that their efforts at ambulation are of vital importance to reduce the risk of this complication thus placing significant burden on them as to the prevention of such issues.  Signs and symptoms of DVT / PE have been discussed with the patient and they have been instructed to call the office if any these occur in the \"at home\" post op phase    Signed By: Hermila Riley MD     May 7, 2018

## 2018-05-07 NOTE — PROGRESS NOTES
Pt attended pre-op class which addressed: gastric bypass and sleeve gastrectomy surgical procedure, mechanism of action, risks/benefits and behavioral components necessary for success. Pre-op planning and perioperative expectations also reviewed. Questions answered to satisfaction.   Piotr Gupta PA-C

## 2018-05-14 ENCOUNTER — ANESTHESIA EVENT (OUTPATIENT)
Dept: SURGERY | Age: 28
DRG: 630 | End: 2018-05-14
Payer: COMMERCIAL

## 2018-05-15 ENCOUNTER — ANESTHESIA (OUTPATIENT)
Dept: SURGERY | Age: 28
DRG: 630 | End: 2018-05-15
Payer: COMMERCIAL

## 2018-05-15 ENCOUNTER — HOSPITAL ENCOUNTER (INPATIENT)
Age: 28
LOS: 1 days | Discharge: HOME | DRG: 630 | End: 2018-05-16
Attending: SPECIALIST | Admitting: SPECIALIST
Payer: COMMERCIAL

## 2018-05-15 LAB — HCG SERPL QL: NEGATIVE

## 2018-05-15 PROCEDURE — 0DB84ZZ EXCISION OF SMALL INTESTINE, PERCUTANEOUS ENDOSCOPIC APPROACH: ICD-10-PCS | Performed by: SPECIALIST

## 2018-05-15 PROCEDURE — 88305 TISSUE EXAM BY PATHOLOGIST: CPT | Performed by: SPECIALIST

## 2018-05-15 PROCEDURE — 0DB68ZX EXCISION OF STOMACH, VIA NATURAL OR ARTIFICIAL OPENING ENDOSCOPIC, DIAGNOSTIC: ICD-10-PCS | Performed by: SPECIALIST

## 2018-05-15 PROCEDURE — 77030005264 HC CATH JEJU BKR BARD -D: Performed by: SPECIALIST

## 2018-05-15 PROCEDURE — 77030018836 HC SOL IRR NACL ICUM -A: Performed by: SPECIALIST

## 2018-05-15 PROCEDURE — 0DN84ZZ RELEASE SMALL INTESTINE, PERCUTANEOUS ENDOSCOPIC APPROACH: ICD-10-PCS | Performed by: SPECIALIST

## 2018-05-15 PROCEDURE — 77030010515 HC APPL ENDOCLP LIG J&J -B: Performed by: SPECIALIST

## 2018-05-15 PROCEDURE — 74011250636 HC RX REV CODE- 250/636: Performed by: SPECIALIST

## 2018-05-15 PROCEDURE — 77030002896 HC SUT CLP ABSRB J&J -B: Performed by: SPECIALIST

## 2018-05-15 PROCEDURE — 76210000017 HC OR PH I REC 1.5 TO 2 HR: Performed by: SPECIALIST

## 2018-05-15 PROCEDURE — 36415 COLL VENOUS BLD VENIPUNCTURE: CPT | Performed by: SPECIALIST

## 2018-05-15 PROCEDURE — 77030033200 HC PRT CLSR CRTR THOMP COOP -C: Performed by: SPECIALIST

## 2018-05-15 PROCEDURE — 77030002912 HC SUT ETHBND J&J -A: Performed by: SPECIALIST

## 2018-05-15 PROCEDURE — 74011250636 HC RX REV CODE- 250/636

## 2018-05-15 PROCEDURE — 77030002966 HC SUT PDS J&J -A: Performed by: SPECIALIST

## 2018-05-15 PROCEDURE — 88307 TISSUE EXAM BY PATHOLOGIST: CPT | Performed by: SPECIALIST

## 2018-05-15 PROCEDURE — 74011000250 HC RX REV CODE- 250: Performed by: SPECIALIST

## 2018-05-15 PROCEDURE — 77030010030: Performed by: SPECIALIST

## 2018-05-15 PROCEDURE — 77030003580 HC NDL INSUF VERES J&J -B: Performed by: SPECIALIST

## 2018-05-15 PROCEDURE — 77030022585 HC SEAL FBRN EVICEL J&J -F: Performed by: SPECIALIST

## 2018-05-15 PROCEDURE — 77030027876 HC STPLR ENDOSC FLX PWR J&J -G1: Performed by: SPECIALIST

## 2018-05-15 PROCEDURE — 77030002916 HC SUT ETHLN J&J -A: Performed by: SPECIALIST

## 2018-05-15 PROCEDURE — 77030008683 HC TU ET CUF COVD -A: Performed by: SPECIALIST

## 2018-05-15 PROCEDURE — 77030012893: Performed by: SPECIALIST

## 2018-05-15 PROCEDURE — 77030020782 HC GWN BAIR PAWS FLX 3M -B: Performed by: SPECIALIST

## 2018-05-15 PROCEDURE — 76010000135 HC OR TIME 4 TO 4.5 HR: Performed by: SPECIALIST

## 2018-05-15 PROCEDURE — 77030013567 HC DRN WND RESERV BARD -A: Performed by: SPECIALIST

## 2018-05-15 PROCEDURE — 77030008477 HC STYL SATN SLP COVD -A: Performed by: SPECIALIST

## 2018-05-15 PROCEDURE — 77030009426 HC FCPS BIOP ENDOSC BSC -B: Performed by: SPECIALIST

## 2018-05-15 PROCEDURE — 77030011810 HC STPLR ENDOSC J&J -G: Performed by: SPECIALIST

## 2018-05-15 PROCEDURE — 65270000029 HC RM PRIVATE

## 2018-05-15 PROCEDURE — 77030032490 HC SLV COMPR SCD KNE COVD -B: Performed by: SPECIALIST

## 2018-05-15 PROCEDURE — 77030020255 HC SOL INJ LR 1000ML BG: Performed by: SPECIALIST

## 2018-05-15 PROCEDURE — 74011000250 HC RX REV CODE- 250

## 2018-05-15 PROCEDURE — 77030006643: Performed by: SPECIALIST

## 2018-05-15 PROCEDURE — 77030036732 HC RELD STPLR VASC J&J -F: Performed by: SPECIALIST

## 2018-05-15 PROCEDURE — 77030036598 HC CARTDRG STPL RELD ECHELON FLX J&J -D: Performed by: SPECIALIST

## 2018-05-15 PROCEDURE — 77030008603 HC TRCR ENDOSC EPATH J&J -C: Performed by: SPECIALIST

## 2018-05-15 PROCEDURE — 77030009851 HC PCH RTVR ENDOSC AMR -B: Performed by: SPECIALIST

## 2018-05-15 PROCEDURE — 77030014008 HC SPNG HEMSTAT J&J -C: Performed by: SPECIALIST

## 2018-05-15 PROCEDURE — 76060000039 HC ANESTHESIA 4 TO 4.5 HR: Performed by: SPECIALIST

## 2018-05-15 PROCEDURE — 0FB04ZX EXCISION OF LIVER, PERCUTANEOUS ENDOSCOPIC APPROACH, DIAGNOSTIC: ICD-10-PCS | Performed by: SPECIALIST

## 2018-05-15 PROCEDURE — 77030018004 HC RELD STPLR ENDOSC1 J&J -C: Performed by: SPECIALIST

## 2018-05-15 PROCEDURE — 77030002986 HC SUT PROL J&J -A: Performed by: SPECIALIST

## 2018-05-15 PROCEDURE — 77030012407 HC DRN WND BARD -B: Performed by: SPECIALIST

## 2018-05-15 PROCEDURE — 77030034154 HC SHR COAG HARM ACE J&J -F: Performed by: SPECIALIST

## 2018-05-15 PROCEDURE — 84703 CHORIONIC GONADOTROPIN ASSAY: CPT | Performed by: SPECIALIST

## 2018-05-15 PROCEDURE — 0DT64ZZ RESECTION OF STOMACH, PERCUTANEOUS ENDOSCOPIC APPROACH: ICD-10-PCS | Performed by: SPECIALIST

## 2018-05-15 PROCEDURE — 77030008517 HC TBNG INSUF ENDO STOR -B: Performed by: SPECIALIST

## 2018-05-15 PROCEDURE — 88313 SPECIAL STAINS GROUP 2: CPT | Performed by: SPECIALIST

## 2018-05-15 PROCEDURE — 77030002933 HC SUT MCRYL J&J -A: Performed by: SPECIALIST

## 2018-05-15 PROCEDURE — 77030034850: Performed by: SPECIALIST

## 2018-05-15 RX ORDER — HYDROMORPHONE HYDROCHLORIDE 2 MG/ML
INJECTION, SOLUTION INTRAMUSCULAR; INTRAVENOUS; SUBCUTANEOUS AS NEEDED
Status: DISCONTINUED | OUTPATIENT
Start: 2018-05-15 | End: 2018-05-15 | Stop reason: HOSPADM

## 2018-05-15 RX ORDER — FENTANYL CITRATE 50 UG/ML
25 INJECTION, SOLUTION INTRAMUSCULAR; INTRAVENOUS AS NEEDED
Status: DISCONTINUED | OUTPATIENT
Start: 2018-05-15 | End: 2018-05-15 | Stop reason: HOSPADM

## 2018-05-15 RX ORDER — ACETAMINOPHEN 10 MG/ML
1000 INJECTION, SOLUTION INTRAVENOUS ONCE
Status: COMPLETED | OUTPATIENT
Start: 2018-05-15 | End: 2018-05-15

## 2018-05-15 RX ORDER — MORPHINE SULFATE 4 MG/ML
1 INJECTION INTRAVENOUS
Status: DISCONTINUED | OUTPATIENT
Start: 2018-05-15 | End: 2018-05-15 | Stop reason: HOSPADM

## 2018-05-15 RX ORDER — DEXTROSE 50 % IN WATER (D50W) INTRAVENOUS SYRINGE
25-50 AS NEEDED
Status: DISCONTINUED | OUTPATIENT
Start: 2018-05-15 | End: 2018-05-15 | Stop reason: HOSPADM

## 2018-05-15 RX ORDER — ROCURONIUM BROMIDE 10 MG/ML
INJECTION, SOLUTION INTRAVENOUS AS NEEDED
Status: DISCONTINUED | OUTPATIENT
Start: 2018-05-15 | End: 2018-05-15 | Stop reason: HOSPADM

## 2018-05-15 RX ORDER — ONDANSETRON 2 MG/ML
4 INJECTION INTRAMUSCULAR; INTRAVENOUS
Status: DISCONTINUED | OUTPATIENT
Start: 2018-05-15 | End: 2018-05-16 | Stop reason: HOSPADM

## 2018-05-15 RX ORDER — HYDROMORPHONE HYDROCHLORIDE 2 MG/ML
0.2 INJECTION, SOLUTION INTRAMUSCULAR; INTRAVENOUS; SUBCUTANEOUS
Status: DISCONTINUED | OUTPATIENT
Start: 2018-05-15 | End: 2018-05-15 | Stop reason: RX

## 2018-05-15 RX ORDER — FENTANYL CITRATE 50 UG/ML
50 INJECTION, SOLUTION INTRAMUSCULAR; INTRAVENOUS
Status: DISCONTINUED | OUTPATIENT
Start: 2018-05-15 | End: 2018-05-15 | Stop reason: HOSPADM

## 2018-05-15 RX ORDER — MIDAZOLAM HYDROCHLORIDE 1 MG/ML
INJECTION, SOLUTION INTRAMUSCULAR; INTRAVENOUS AS NEEDED
Status: DISCONTINUED | OUTPATIENT
Start: 2018-05-15 | End: 2018-05-15 | Stop reason: HOSPADM

## 2018-05-15 RX ORDER — DEXMEDETOMIDINE HYDROCHLORIDE 4 UG/ML
INJECTION, SOLUTION INTRAVENOUS AS NEEDED
Status: DISCONTINUED | OUTPATIENT
Start: 2018-05-15 | End: 2018-05-15 | Stop reason: HOSPADM

## 2018-05-15 RX ORDER — KETOROLAC TROMETHAMINE 30 MG/ML
30 INJECTION, SOLUTION INTRAMUSCULAR; INTRAVENOUS ONCE
Status: DISCONTINUED | OUTPATIENT
Start: 2018-05-15 | End: 2018-05-15 | Stop reason: HOSPADM

## 2018-05-15 RX ORDER — NALOXONE HYDROCHLORIDE 0.4 MG/ML
0.1 INJECTION, SOLUTION INTRAMUSCULAR; INTRAVENOUS; SUBCUTANEOUS AS NEEDED
Status: DISCONTINUED | OUTPATIENT
Start: 2018-05-15 | End: 2018-05-15 | Stop reason: HOSPADM

## 2018-05-15 RX ORDER — NEOSTIGMINE METHYLSULFATE 5 MG/5 ML
SYRINGE (ML) INTRAVENOUS AS NEEDED
Status: DISCONTINUED | OUTPATIENT
Start: 2018-05-15 | End: 2018-05-15 | Stop reason: HOSPADM

## 2018-05-15 RX ORDER — FENTANYL CITRATE 50 UG/ML
INJECTION, SOLUTION INTRAMUSCULAR; INTRAVENOUS AS NEEDED
Status: DISCONTINUED | OUTPATIENT
Start: 2018-05-15 | End: 2018-05-15 | Stop reason: HOSPADM

## 2018-05-15 RX ORDER — HYDROMORPHONE HYDROCHLORIDE 2 MG/ML
0.5 INJECTION, SOLUTION INTRAMUSCULAR; INTRAVENOUS; SUBCUTANEOUS
Status: DISCONTINUED | OUTPATIENT
Start: 2018-05-15 | End: 2018-05-15 | Stop reason: HOSPADM

## 2018-05-15 RX ORDER — SODIUM CHLORIDE 0.9 % (FLUSH) 0.9 %
5-10 SYRINGE (ML) INJECTION AS NEEDED
Status: DISCONTINUED | OUTPATIENT
Start: 2018-05-15 | End: 2018-05-15 | Stop reason: HOSPADM

## 2018-05-15 RX ORDER — KETOROLAC TROMETHAMINE 30 MG/ML
30 INJECTION, SOLUTION INTRAMUSCULAR; INTRAVENOUS
Status: COMPLETED | OUTPATIENT
Start: 2018-05-15 | End: 2018-05-15

## 2018-05-15 RX ORDER — SODIUM CHLORIDE, SODIUM LACTATE, POTASSIUM CHLORIDE, CALCIUM CHLORIDE 600; 310; 30; 20 MG/100ML; MG/100ML; MG/100ML; MG/100ML
150 INJECTION, SOLUTION INTRAVENOUS CONTINUOUS
Status: DISCONTINUED | OUTPATIENT
Start: 2018-05-15 | End: 2018-05-16 | Stop reason: HOSPADM

## 2018-05-15 RX ORDER — MORPHINE SULFATE 10 MG/ML
8 INJECTION, SOLUTION INTRAMUSCULAR; INTRAVENOUS
Status: DISCONTINUED | OUTPATIENT
Start: 2018-05-15 | End: 2018-05-16

## 2018-05-15 RX ORDER — BUPIVACAINE HYDROCHLORIDE AND EPINEPHRINE 5; 5 MG/ML; UG/ML
INJECTION, SOLUTION EPIDURAL; INTRACAUDAL; PERINEURAL AS NEEDED
Status: DISCONTINUED | OUTPATIENT
Start: 2018-05-15 | End: 2018-05-15 | Stop reason: HOSPADM

## 2018-05-15 RX ORDER — EPHEDRINE SULFATE/0.9% NACL/PF 25 MG/5 ML
SYRINGE (ML) INTRAVENOUS AS NEEDED
Status: DISCONTINUED | OUTPATIENT
Start: 2018-05-15 | End: 2018-05-15 | Stop reason: HOSPADM

## 2018-05-15 RX ORDER — MORPHINE SULFATE 10 MG/ML
5 INJECTION, SOLUTION INTRAMUSCULAR; INTRAVENOUS ONCE
Status: COMPLETED | OUTPATIENT
Start: 2018-05-15 | End: 2018-05-15

## 2018-05-15 RX ORDER — HYDROMORPHONE HYDROCHLORIDE 2 MG/ML
0.2 INJECTION, SOLUTION INTRAMUSCULAR; INTRAVENOUS; SUBCUTANEOUS
Status: DISCONTINUED | OUTPATIENT
Start: 2018-05-15 | End: 2018-05-15 | Stop reason: HOSPADM

## 2018-05-15 RX ORDER — CEFAZOLIN SODIUM/WATER 2 G/20 ML
2 SYRINGE (ML) INTRAVENOUS EVERY 8 HOURS
Status: DISCONTINUED | OUTPATIENT
Start: 2018-05-15 | End: 2018-05-15 | Stop reason: DRUGHIGH

## 2018-05-15 RX ORDER — MORPHINE SULFATE 4 MG/ML
2 INJECTION INTRAVENOUS
Status: DISCONTINUED | OUTPATIENT
Start: 2018-05-15 | End: 2018-05-15 | Stop reason: HOSPADM

## 2018-05-15 RX ORDER — SODIUM CHLORIDE 9 MG/ML
125 INJECTION, SOLUTION INTRAVENOUS CONTINUOUS
Status: DISCONTINUED | OUTPATIENT
Start: 2018-05-15 | End: 2018-05-15 | Stop reason: HOSPADM

## 2018-05-15 RX ORDER — ACETAMINOPHEN 10 MG/ML
1000 INJECTION, SOLUTION INTRAVENOUS EVERY 6 HOURS
Status: COMPLETED | OUTPATIENT
Start: 2018-05-15 | End: 2018-05-16

## 2018-05-15 RX ORDER — GLYCOPYRROLATE 0.2 MG/ML
INJECTION INTRAMUSCULAR; INTRAVENOUS AS NEEDED
Status: DISCONTINUED | OUTPATIENT
Start: 2018-05-15 | End: 2018-05-15 | Stop reason: HOSPADM

## 2018-05-15 RX ORDER — LIDOCAINE HYDROCHLORIDE 20 MG/ML
INJECTION, SOLUTION EPIDURAL; INFILTRATION; INTRACAUDAL; PERINEURAL AS NEEDED
Status: DISCONTINUED | OUTPATIENT
Start: 2018-05-15 | End: 2018-05-15 | Stop reason: HOSPADM

## 2018-05-15 RX ORDER — DIPHENHYDRAMINE HYDROCHLORIDE 50 MG/ML
25 INJECTION, SOLUTION INTRAMUSCULAR; INTRAVENOUS
Status: DISCONTINUED | OUTPATIENT
Start: 2018-05-15 | End: 2018-05-16 | Stop reason: HOSPADM

## 2018-05-15 RX ORDER — ENOXAPARIN SODIUM 100 MG/ML
40 INJECTION SUBCUTANEOUS EVERY 12 HOURS
Status: DISCONTINUED | OUTPATIENT
Start: 2018-05-15 | End: 2018-05-16 | Stop reason: HOSPADM

## 2018-05-15 RX ORDER — HYDROMORPHONE HYDROCHLORIDE 2 MG/ML
0.5 INJECTION, SOLUTION INTRAMUSCULAR; INTRAVENOUS; SUBCUTANEOUS
Status: DISCONTINUED | OUTPATIENT
Start: 2018-05-15 | End: 2018-05-15 | Stop reason: RX

## 2018-05-15 RX ORDER — MAGNESIUM SULFATE 100 %
4 CRYSTALS MISCELLANEOUS AS NEEDED
Status: DISCONTINUED | OUTPATIENT
Start: 2018-05-15 | End: 2018-05-15 | Stop reason: HOSPADM

## 2018-05-15 RX ORDER — SODIUM CHLORIDE, SODIUM LACTATE, POTASSIUM CHLORIDE, CALCIUM CHLORIDE 600; 310; 30; 20 MG/100ML; MG/100ML; MG/100ML; MG/100ML
125 INJECTION, SOLUTION INTRAVENOUS CONTINUOUS
Status: DISCONTINUED | OUTPATIENT
Start: 2018-05-15 | End: 2018-05-15

## 2018-05-15 RX ORDER — PROPOFOL 10 MG/ML
INJECTION, EMULSION INTRAVENOUS AS NEEDED
Status: DISCONTINUED | OUTPATIENT
Start: 2018-05-15 | End: 2018-05-15 | Stop reason: HOSPADM

## 2018-05-15 RX ORDER — ONDANSETRON 2 MG/ML
INJECTION INTRAMUSCULAR; INTRAVENOUS AS NEEDED
Status: DISCONTINUED | OUTPATIENT
Start: 2018-05-15 | End: 2018-05-15 | Stop reason: HOSPADM

## 2018-05-15 RX ORDER — ONDANSETRON 2 MG/ML
4 INJECTION INTRAMUSCULAR; INTRAVENOUS ONCE
Status: COMPLETED | OUTPATIENT
Start: 2018-05-15 | End: 2018-05-15

## 2018-05-15 RX ADMIN — LIDOCAINE HYDROCHLORIDE 100 MG: 20 INJECTION, SOLUTION EPIDURAL; INFILTRATION; INTRACAUDAL; PERINEURAL at 07:31

## 2018-05-15 RX ADMIN — DEXMEDETOMIDINE HYDROCHLORIDE 8 MCG: 4 INJECTION, SOLUTION INTRAVENOUS at 07:58

## 2018-05-15 RX ADMIN — KETOROLAC TROMETHAMINE 30 MG: 30 INJECTION, SOLUTION INTRAMUSCULAR at 14:53

## 2018-05-15 RX ADMIN — SODIUM CHLORIDE, SODIUM LACTATE, POTASSIUM CHLORIDE, AND CALCIUM CHLORIDE: 600; 310; 30; 20 INJECTION, SOLUTION INTRAVENOUS at 07:44

## 2018-05-15 RX ADMIN — ACETAMINOPHEN 1000 MG: 10 INJECTION, SOLUTION INTRAVENOUS at 14:03

## 2018-05-15 RX ADMIN — FENTANYL CITRATE 25 MCG: 50 INJECTION, SOLUTION INTRAMUSCULAR; INTRAVENOUS at 07:24

## 2018-05-15 RX ADMIN — MORPHINE SULFATE 8 MG: 10 INJECTION INTRAVENOUS at 21:54

## 2018-05-15 RX ADMIN — ENOXAPARIN SODIUM 40 MG: 40 INJECTION SUBCUTANEOUS at 14:54

## 2018-05-15 RX ADMIN — SODIUM CHLORIDE, SODIUM LACTATE, POTASSIUM CHLORIDE, AND CALCIUM CHLORIDE: 600; 310; 30; 20 INJECTION, SOLUTION INTRAVENOUS at 10:33

## 2018-05-15 RX ADMIN — Medication 6 MG: at 11:18

## 2018-05-15 RX ADMIN — MORPHINE SULFATE 5 MG: 10 INJECTION INTRAVENOUS at 16:08

## 2018-05-15 RX ADMIN — ONDANSETRON 4 MG: 2 INJECTION INTRAMUSCULAR; INTRAVENOUS at 11:56

## 2018-05-15 RX ADMIN — FENTANYL CITRATE 50 MCG: 50 INJECTION, SOLUTION INTRAMUSCULAR; INTRAVENOUS at 12:25

## 2018-05-15 RX ADMIN — ROCURONIUM BROMIDE 25 MG: 10 INJECTION, SOLUTION INTRAVENOUS at 08:32

## 2018-05-15 RX ADMIN — ONDANSETRON 4 MG: 2 INJECTION INTRAMUSCULAR; INTRAVENOUS at 11:21

## 2018-05-15 RX ADMIN — ACETAMINOPHEN 1000 MG: 10 INJECTION, SOLUTION INTRAVENOUS at 18:53

## 2018-05-15 RX ADMIN — HYDROMORPHONE HYDROCHLORIDE 0.5 MG: 2 INJECTION INTRAMUSCULAR; INTRAVENOUS; SUBCUTANEOUS at 13:02

## 2018-05-15 RX ADMIN — ACETAMINOPHEN 1000 MG: 10 INJECTION, SOLUTION INTRAVENOUS at 07:38

## 2018-05-15 RX ADMIN — PROMETHAZINE HYDROCHLORIDE 12.5 MG: 25 INJECTION INTRAMUSCULAR; INTRAVENOUS at 16:11

## 2018-05-15 RX ADMIN — MORPHINE SULFATE 8 MG: 10 INJECTION INTRAVENOUS at 18:52

## 2018-05-15 RX ADMIN — Medication 5 MG: at 09:55

## 2018-05-15 RX ADMIN — SODIUM CHLORIDE, SODIUM LACTATE, POTASSIUM CHLORIDE, AND CALCIUM CHLORIDE: 600; 310; 30; 20 INJECTION, SOLUTION INTRAVENOUS at 09:08

## 2018-05-15 RX ADMIN — HYDROMORPHONE HYDROCHLORIDE 1 MG: 2 INJECTION, SOLUTION INTRAMUSCULAR; INTRAVENOUS; SUBCUTANEOUS at 10:38

## 2018-05-15 RX ADMIN — HYDROMORPHONE HYDROCHLORIDE 1 MG: 2 INJECTION, SOLUTION INTRAMUSCULAR; INTRAVENOUS; SUBCUTANEOUS at 09:07

## 2018-05-15 RX ADMIN — FENTANYL CITRATE 25 MCG: 50 INJECTION, SOLUTION INTRAMUSCULAR; INTRAVENOUS at 12:13

## 2018-05-15 RX ADMIN — ONDANSETRON 4 MG: 2 INJECTION INTRAMUSCULAR; INTRAVENOUS at 14:02

## 2018-05-15 RX ADMIN — FENTANYL CITRATE 50 MCG: 50 INJECTION, SOLUTION INTRAMUSCULAR; INTRAVENOUS at 11:38

## 2018-05-15 RX ADMIN — SODIUM CHLORIDE, SODIUM LACTATE, POTASSIUM CHLORIDE, AND CALCIUM CHLORIDE 125 ML/HR: 600; 310; 30; 20 INJECTION, SOLUTION INTRAVENOUS at 13:10

## 2018-05-15 RX ADMIN — Medication 3 G: at 14:58

## 2018-05-15 RX ADMIN — DEXMEDETOMIDINE HYDROCHLORIDE 8 MCG: 4 INJECTION, SOLUTION INTRAVENOUS at 07:49

## 2018-05-15 RX ADMIN — SODIUM CHLORIDE, SODIUM LACTATE, POTASSIUM CHLORIDE, AND CALCIUM CHLORIDE 125 ML/HR: 600; 310; 30; 20 INJECTION, SOLUTION INTRAVENOUS at 06:29

## 2018-05-15 RX ADMIN — ROCURONIUM BROMIDE 10 MG: 10 INJECTION, SOLUTION INTRAVENOUS at 10:38

## 2018-05-15 RX ADMIN — FENTANYL CITRATE 25 MCG: 50 INJECTION, SOLUTION INTRAMUSCULAR; INTRAVENOUS at 11:48

## 2018-05-15 RX ADMIN — GLYCOPYRROLATE 0.6 MG: 0.2 INJECTION INTRAMUSCULAR; INTRAVENOUS at 11:15

## 2018-05-15 RX ADMIN — MIDAZOLAM HYDROCHLORIDE 2 MG: 1 INJECTION, SOLUTION INTRAMUSCULAR; INTRAVENOUS at 07:24

## 2018-05-15 RX ADMIN — SODIUM CHLORIDE, SODIUM LACTATE, POTASSIUM CHLORIDE, AND CALCIUM CHLORIDE: 600; 310; 30; 20 INJECTION, SOLUTION INTRAVENOUS at 08:17

## 2018-05-15 RX ADMIN — ROCURONIUM BROMIDE 25 MG: 10 INJECTION, SOLUTION INTRAVENOUS at 09:07

## 2018-05-15 RX ADMIN — SODIUM CHLORIDE, SODIUM LACTATE, POTASSIUM CHLORIDE, AND CALCIUM CHLORIDE 1000 ML: 600; 310; 30; 20 INJECTION, SOLUTION INTRAVENOUS at 06:37

## 2018-05-15 RX ADMIN — SODIUM CHLORIDE, SODIUM LACTATE, POTASSIUM CHLORIDE, AND CALCIUM CHLORIDE 150 ML/HR: 600; 310; 30; 20 INJECTION, SOLUTION INTRAVENOUS at 18:53

## 2018-05-15 RX ADMIN — Medication 3 G: at 07:35

## 2018-05-15 RX ADMIN — ROCURONIUM BROMIDE 50 MG: 10 INJECTION, SOLUTION INTRAVENOUS at 07:31

## 2018-05-15 RX ADMIN — ROCURONIUM BROMIDE 25 MG: 10 INJECTION, SOLUTION INTRAVENOUS at 07:58

## 2018-05-15 RX ADMIN — FENTANYL CITRATE 225 MCG: 50 INJECTION, SOLUTION INTRAMUSCULAR; INTRAVENOUS at 07:31

## 2018-05-15 RX ADMIN — PROPOFOL 200 MG: 10 INJECTION, EMULSION INTRAVENOUS at 07:31

## 2018-05-15 RX ADMIN — Medication 10 MG: at 08:20

## 2018-05-15 RX ADMIN — ROCURONIUM BROMIDE 25 MG: 10 INJECTION, SOLUTION INTRAVENOUS at 09:37

## 2018-05-15 RX ADMIN — Medication 3 G: at 21:54

## 2018-05-15 RX ADMIN — PROMETHAZINE HYDROCHLORIDE 12.5 MG: 25 INJECTION INTRAMUSCULAR; INTRAVENOUS at 20:36

## 2018-05-15 RX ADMIN — SODIUM CHLORIDE, SODIUM LACTATE, POTASSIUM CHLORIDE, AND CALCIUM CHLORIDE 150 ML/HR: 600; 310; 30; 20 INJECTION, SOLUTION INTRAVENOUS at 14:07

## 2018-05-15 RX ADMIN — MORPHINE SULFATE 8 MG: 10 INJECTION INTRAVENOUS at 14:02

## 2018-05-15 NOTE — ANESTHESIA PREPROCEDURE EVALUATION
Anesthetic History   No history of anesthetic complications     Pertinent negatives: No PONV       Review of Systems / Medical History  Patient summary reviewed, nursing notes reviewed and pertinent labs reviewed    Pulmonary              Pertinent negatives: No COPD, asthma and smoker     Neuro/Psych         Psychiatric history     Cardiovascular  Within defined limits              Pertinent negatives: No hypertension, past MI and CAD  Exercise tolerance: >4 METS     GI/Hepatic/Renal  Within defined limits           Pertinent negatives: No GERD, liver disease and renal disease   Endo/Other        Morbid obesity  Pertinent negatives: No diabetes   Other Findings   Comments: -etoh          Physical Exam    Airway  Mallampati: II  TM Distance: 4 - 6 cm  Neck ROM: normal range of motion   Mouth opening: Normal     Cardiovascular    Rhythm: regular  Rate: normal         Dental  No notable dental hx       Pulmonary  Breath sounds clear to auscultation               Abdominal  GI exam deferred       Other Findings            Anesthetic Plan    ASA: 3  Anesthesia type: general          Induction: Intravenous  Anesthetic plan and risks discussed with: Patient

## 2018-05-15 NOTE — INTERVAL H&P NOTE
H&P Update:  Diana iSu was seen and examined. History and physical has been reviewed. The patient has been examined.  There have been no significant clinical changes since the completion of the originally dated History and Physical.    Signed By: David Pederson MD     May 15, 2018 7:12 AM

## 2018-05-15 NOTE — PROGRESS NOTES
TRANSFER - IN REPORT:    Verbal report received from BERHANE Godinez RN on [address]  being received from PACU for routine post - op      Report consisted of patients Situation, Background, Assessment and   Recommendations(SBAR). Information from the following report(s) SBAR, Kardex, Procedure Summary, Intake/Output, MAR, Recent Results and Cardiac Rhythm NSR was reviewed with the receiving nurse. Opportunity for questions and clarification was provided. Assessment will be completed upon patients arrival to unit and care assumed.

## 2018-05-15 NOTE — IP AVS SNAPSHOT
[address] [place] [id number] 
[phone] Patient: Ryann Aguilar MRN: [id number] :1990 A check jeimy indicates which time of day the medication should be taken. My Medications CONTINUE taking these medications Instructions Each Dose to Equal  
 Morning Noon Evening Bedtime  
 clonazePAM 0.5 mg tablet Commonly known as:  Viola Bourdon Your last dose was: Your next dose is: TAKE 2 TABLET BY MOUTH EVERY DAY AS NEEDED PANIC/ANXIETY  
     
   
   
   
  
 L-Norgest&E Estradiol-E Estrad 0.15 mg-30 mcg (84)/10 mcg (7) 3mpk Your last dose was: Your next dose is: TAKE 1 TABLET BY MOUTH DAILY  
     
   
   
   
  
 omeprazole 20 mg capsule Commonly known as:  PRILOSEC Your last dose was: Your next dose is: Take 1 Cap by mouth daily for 30 days. 20 mg  
    
   
   
   
  
 ONE-A-DAY WOMENS FORMULA PO Your last dose was: Your next dose is: Take  by mouth. oxyCODONE-acetaminophen 5-325 mg per tablet Commonly known as:  PERCOCET Your last dose was: Your next dose is: Take 1 Tab by mouth every four (4) hours as needed for Pain. Max Daily Amount: 6 Tabs. 1 Tab PROzac 40 mg capsule Generic drug:  FLUoxetine Your last dose was: Your next dose is: Take 60 mg by mouth nightly. 60 mg  
    
   
   
   
  
  
STOP taking these medications   
 cyanocobalamin 1,000 mcg tablet  
   
  
 ergocalciferol 50,000 unit capsule Commonly known as:  ERGOCALCIFEROL

## 2018-05-15 NOTE — PROGRESS NOTES
SHIFT SUMMARY  Pt has been stable since admission. This nurse and LEILA QUIGLEY RN performed a dual skin assessment on this patient on admission. No impairment noted other than 4 lap sites with RAMONITA drain   James score is 22. Pt c/o of pain most of the day. MD was notified. 5 Mg Morphine once and phenergan was  given. Pt was able to rest for about 3 hours. Pt ambulated  the room but was made aware of need to ambulate more. Bedside shift change report given to ERICKSON Araiza RN. Report included the following information SBAR, Kardex, Procedure Summary, Intake/Output, MAR and Recent Results.

## 2018-05-15 NOTE — PERIOP NOTES
Dual skin assessment completed with Moyer Aureliano, RN. Reviewed PTA medication list with patient/caregiver and patient/caregiver denies any additional medications.  Patient admits to having a responsible adult care for them for at least 24 hours after surgery.

## 2018-05-15 NOTE — IP AVS SNAPSHOT
Dariel Robb 
 
 
 [address] [address] 
[phone] Patient: Margarita Hernandez MRN: [id number] :1990 About your hospitalization You were admitted on:  May 15, 2018 You last received care in the:  [address] [place] [address] You were discharged on:  May 16, 2018 Why you were hospitalized Your primary diagnosis was: Morbid Obesity With Bmi Of 40.0-44.9, Adult (Hcc) Follow-up Information Follow up With Details Comments Contact Info Karen Peralta MD On 2018 Follow up appointment scheduled for May 30, 2018 at 1:00 p.m. [address] [facility] 
[phone] Kasey West MD   Patient can only remember the practice name and not the physician Your Scheduled Appointments Wednesday May 30, 2018  1:00 PM EDT  
POST OP with LEILA Guerrero [employer] [facility] ([address])  
 [address] [place] [address] [facility]  
[phone] 2018 10:30 AM EDT Office Visit with Margarita Causey NP [facility] ([address])  
 [address] [place] [address] [facility]  
[phone] 2018 11:00 AM EDT Office Visit with TSS NUTRI VISIT PEN [facility] ([address])  
 [address] [place] [address] [facility]  
[phone] Discharge Orders None A check jeimy indicates which time of day the medication should be taken. My Medications CONTINUE taking these medications Instructions Each Dose to Equal  
 Morning Noon Evening Bedtime  
 clonazePAM 0.5 mg tablet Commonly known as:  Adelso Hudson Your last dose was: Your next dose is: TAKE 2 TABLET BY MOUTH EVERY DAY AS NEEDED PANIC/ANXIETY L-Norgest&E Estradiol-E Estrad 0.15 mg-30 mcg (84)/10 mcg (7) 3mpk Your last dose was: Your next dose is: TAKE 1 TABLET BY MOUTH DAILY  
     
   
   
   
  
 omeprazole 20 mg capsule Commonly known as:  PRILOSEC Your last dose was: Your next dose is: Take 1 Cap by mouth daily for 30 days. 20 mg  
    
   
   
   
  
 ONE-A-DAY WOMENS FORMULA PO Your last dose was: Your next dose is: Take  by mouth. oxyCODONE-acetaminophen 5-325 mg per tablet Commonly known as:  PERCOCET Your last dose was: Your next dose is: Take 1 Tab by mouth every four (4) hours as needed for Pain. Max Daily Amount: 6 Tabs. 1 Tab PROzac 40 mg capsule Generic drug:  FLUoxetine Your last dose was: Your next dose is: Take 60 mg by mouth nightly. 60 mg  
    
   
   
   
  
  
STOP taking these medications   
 cyanocobalamin 1,000 mcg tablet  
   
  
 ergocalciferol 50,000 unit capsule Commonly known as:  ERGOCALCIFEROL Opioid Education Prescription Opioids: What You Need to Know: 
 
Prescription opioids can be used to help relieve moderate-to-severe pain and are often prescribed following a surgery or injury, or for certain health conditions. These medications can be an important part of treatment but also come with serious risks. Opioids are strong pain medicines. Examples include hydrocodone, oxycodone, fentanyl, and morphine. Heroin is an example of an illegal opioid. It is important to work with your health care provider to make sure you are getting the safest, most effective care. WHAT ARE THE RISKS AND SIDE EFFECTS OF OPIOID USE? Prescription opioids carry serious risks of addiction and overdose, especially with prolonged use.   An opioid overdose, often marked by slow breathing, can cause sudden death. The use of prescription opioids can have a number of side effects as well, even when taken as directed. · Tolerance-meaning you might need to take more of a medication for the same pain relief · Physical dependence-meaning you have symptoms of withdrawal when the medication is stopped. Withdrawal symptoms can include nausea, sweating, chills, diarrhea, stomach cramps, and muscle aches. Withdrawal can last up to several weeks, depending on which drug you took and how long you took it. · Increased sensitivity to pain · Constipation · Nausea, vomiting, and dry mouth · Sleepiness and dizziness · Confusion · Depression · Low levels of testosterone that can result in lower sex drive, energy, and strength · Itching and sweating RISKS ARE GREATER WITH:      
· History of drug misuse, substance use disorder, or overdose · Mental health conditions (such as depression or anxiety) · Sleep apnea · Older age (72 years or older) · Pregnancy Avoid alcohol while taking prescription opioids. Also, unless specifically advised by your health care provider, medications to avoid include: · Benzodiazepines (such as Xanax or Valium) · Muscle relaxants (such as Soma or Flexeril) · Hypnotics (such as Ambien or Lunesta) · Other prescription opioids KNOW YOUR OPTIONS Talk to your health care provider about ways to manage your pain that don't involve prescription opioids. Some of these options may actually work better and have fewer risks and side effects. Options may include: 
· Pain relievers such as acetaminophen, ibuprofen, and naproxen · Some medications that are also used for depression or seizures · Physical therapy and exercise · Counseling to help patients learn how to cope better with triggers of pain and stress. · Application of heat or cold compress · Massage therapy · Relaxation techniques Be Informed Make sure you know the name of your medication, how much and how often to take it, and its potential risks & side effects. IF YOU ARE PRESCRIBED OPIOIDS FOR PAIN: 
· Never take opioids in greater amounts or more often than prescribed. Remember the goal is not to be pain-free but to manage your pain at a tolerable level. · Follow up with your primary care provider to: · Work together to create a plan on how to manage your pain. · Talk about ways to help manage your pain that don't involve prescription opioids. · Talk about any and all concerns and side effects. · Help prevent misuse and abuse. · Never sell or share prescription opioids · Help prevent misuse and abuse. · Store prescription opioids in a secure place and out of reach of others (this may include visitors, children, friends, and family). · Safely dispose of unused/unwanted prescription opioids: Find your community drug take-back program or your pharmacy mail-back program, or flush them down the toilet, following guidance from the Food and Drug Administration (www.fda.gov/Drugs/ResourcesForYou). · Visit www.cdc.gov/drugoverdose to learn about the risks of opioid abuse and overdose. · If you believe you may be struggling with addiction, tell your health care provider and ask for guidance or call [address] [place] [address] at 2-924-568-HRUT. Discharge Instructions [facility] Sammy Gipson M.D., F.A.C.S. 
[address] [place] [address]., [address] Office: [phone]    Fax:  [phone] Discharge Instruction for Gastric Bypass Patients Diet: 
? Continue with the liquid diet until you are seen in the office. Make sure you sip fluids all day. Goal for total fluid intake is a minimum of 64 ounces per day. ? Aim for  Grams of protein every day.   Occasionally protein shakes with whey protein will cause diarrhea after surgery due to newly developed lactose intolerance. If you experience this, there are other protein drinks on the market that do not use milk proteins such as whey. Activity: 
? Get up and walk at least once an hour during normal waking hours. ? Walk a minimum of 30 minutes every day for exercise. Rest when you are tired. ? Use your Incentive Spirometry (plastic breathing machine) 10 times every hour for two weeks. Take a slow steady breath in until you can not inhale anymore. ? You may shower. No baths, hot tubs or swimming. ? You may climb stairs. Take your time. ? No lifting more than 10-15 lbs. ? If you feel discomfort during an activity, rest. 
? Do not drive for 1 week or until you are off of all narcotics. Wound Care: ? Clean incisions with soap and water when in the shower. Pat dry. ? Leave steri-strips on until they fall off. ? A small amount of drainage may be present from the incisions. Contact the office if you notice an increase in drainage, an odor, increased redness, or fever > 100.5. Medications: 
? You should have received a prescription for pain medication and Prilosec prior to surgery, if not, notify Dr. Dylon Schrader team.  
? Take Prilosec every day until your prescription runs out. It helps the healing process and helps prevent you from having the hiccups and acid reflux. ? For upset stomach you may take over the counter medications such as Maalox, Mylanta, or Pepto Bismol. ? Gas X works very well for bloating when eating or drinking. ? You may take Tylenol. Do not exceed 4,000mg of Tylenol in one day. Percocet has Tylenol in it, you will need to consider this when taking Tylenol and Percocet together. ? Non-aspirin based arthritis medications may be resumed. ? Take a childrens or adult chewable multivitamin.  
? Milk of Magnesia will provide immediate relief of constipation (not for daily use). Daily use of Miralax or Benefiber may be needed if you develop chronic constipation. ? It is fine to take your usual home medications. Blood pressure medications should be continued after surgery, unless it is a diuretic. Diabetic medications can frequently be reduced very quickly after surgery. Diabetics should continue to monitor blood sugars frequently after surgery and contact the prescribing physician for any questions. Follow-Up Appointment: 
? If you do not already have a follow-up appointment scheduled, contact the office in the next few days to obtain one. It is usually scheduled for 10-14 days after your surgery date. Office phone number:  [phone].  
? Call our office if you have questions, concerns or any worsening of condition. If you are not able to reach us, go to your primary care provider or the Emergency Department. Docin Announcement We are excited to announce that we are making your provider's discharge notes available to you in Docin. You will see these notes when they are completed and signed by the physician that discharged you from your recent hospital stay. If you have any questions or concerns about any information you see in Docin, please call the Health Information Department where you were seen or reach out to your Primary Care Provider for more information about your plan of care. Introducing \[facility] [place] [facility]\"" & HEALTH SERVICES! Dear Deyanira Irbys: Thank you for requesting a Docin account. Our records indicate that you already have an active Docin account. You can access your account anytime at [email or web address] Did you know that you can access your hospital and ER discharge instructions at any time in Docin? You can also review all of your test results from your hospital stay or ER visit. Additional Information If you have questions, please visit the Frequently Asked Questions section of the [employer] website at [email or web address] Remember, [employer] is NOT to be used for urgent needs. For medical emergencies, dial 911. Now available from your iPhone and Android! Introducing Sina Block As a [employer] patient, I wanted to make you aware of our electronic visit tool called Sina Block. [employer] 24/7 allows you to connect within minutes with a medical provider 24 hours a day, seven days a week via a mobile device or tablet or logging into a secure website from your computer. You can access Sina Block from anywhere in the [place]. A virtual visit might be right for you when you have a simple condition and feel like you just dont want to get out of bed, or cant get away from work for an appointment, when your regular [employer] provider is not available (evenings, weekends or holidays), or when youre out of town and need minor care. Electronic visits cost only $49 and if the [employer] 24/7 provider determines a prescription is needed to treat your condition, one can be electronically transmitted to a nearby pharmacy*. Please take a moment to enroll today if you have not already done so. The enrollment process is free and takes just a few minutes. To enroll, please download the [employer] 24/7 sherice to your tablet or phone, or visit [email or web address] to enroll on your computer. And, as an [address] [place] [address] patient with a [employer] account, the results of your visits will be scanned into your electronic medical record and your primary care provider will be able to view the scanned results. We urge you to continue to see your regular [employer] provider for your ongoing medical care. And while your primary care provider may not be the one available when you seek a Sina Block virtual visit, the peace of mind you get from getting a real diagnosis real time can be priceless. For more information on Sina Block, view our Frequently Asked Questions (FAQs) at [email or web address]. Sincerely, 
 
James Washburn MD 
Chief Medical Officer [facility] Karyn Hodge *:  certain medications cannot be prescribed via Sina Block Providers Seen During Your Hospitalization Provider Specialty Primary office phone Shannan Villagomez MD General Surgery [phone] Your Primary Care Physician (PCP) Primary Care Physician Office Phone Office Fax OTHER, PHYS ** None ** ** None ** You are allergic to the following No active allergies Recent Documentation Height Weight BMI OB Status Smoking Status 1.676 m 127.1 kg 45.24 kg/m2 Medically Induced Never Smoker Emergency Contacts Name Discharge Info Relation Home Work Mobile [address] CAREGIVER [3] Spouse [3]   [phone] SewellMana DISCHARGE CAREGIVER [3] Mother [14]   [phone] Patient Belongings The following personal items are in your possession at time of discharge: 
  Dental Appliances: None  Visual Aid: None      Home Medications: None   Jewelry: None  Clothing: Pants, Footwear, Undergarments, Shirt, With patient    Other Valuables: Cell Phone, Moises Bullocks, With patient Please provide this summary of care documentation to your next provider. Signatures-by signing, you are acknowledging that this After Visit Summary has been reviewed with you and you have received a copy. Patient Signature:  ____________________________________________________________ Date:  ____________________________________________________________  
  
Anabel Taylor Provider Signature:  ____________________________________________________________ Date:  ____________________________________________________________

## 2018-05-15 NOTE — H&P (VIEW-ONLY)
Gastric Bypass Revision - History and Physical    Subjective: The patient is a 33 y.o. obese female with a Body mass index is 44.69 kg/(m^2). .   she presents now to review their work up to date to see if they are a candidate for surgery and whether or not to proceed with the previously requested procedure. Morbid obesity with associated comorbidity & 11 years post [facility] gastric bypass with diagnostic studies (UGI and EGD) to suggest a larger than average pouch probably amendable to revision procedure . Bariatric comorbidities continue to include:   Patient Active Problem List   Diagnosis Code    Morbid obesity due to excess calories ([address] E66.01    H/O gastric bypass Z98.84    Iron deficiency anemia secondary to inadequate dietary iron intake D50.8    B12 deficiency E53.8    Morbid obesity with BMI of 40.0-44.9, adult (HCC) E66.01, Z68.41    Morbid obesity (HCC) E66.01    Depression F32.9    Borderline diabetes R73.03    Intestinal malabsorption K90.9    Psychotic disorder F29    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       They have been generally well prior to this visit and have had no recent significant illnesses. The patient has had no gastrointestinal issues that would preclude them from proceeding with the surgery they have chosen. Luisa Markham has recently tried a preoperative weight loss program  in addition to seeing a bariatric nutritionist preoperatively. We have discussed on at least one other occasion about the various types of surgical weight loss procedures and they have considered these options after our initial consultation. We have once again discussed these procedures in detail and they have now decided on a surgical procedure. They present today to discuss this and confirm that their evaluation pre operatively is acceptable to continue with surgery. The patient desires revision of prior laparoscopic gastric bypass surgery for surgical weight loss.       The patients goal weight is 167 lb. (this represents a BMI of 27)    These goals are consistent with expected outcomes of their desired operation. her Medical goals are resolution of these health issues. Patient Active Problem List    Diagnosis Date Noted    Psychotic disorder     Dilation of stomach     Morbid obesity with BMI of 40.0-44.9, adult ([address]     Morbid obesity ([address]     Depression     Borderline diabetes     Intestinal malabsorption     Iron deficiency anemia secondary to inadequate dietary iron intake 12/21/2016    B12 deficiency 12/21/2016    Morbid obesity due to excess calories ([address] 07/30/2016    H/O gastric bypass 07/30/2016      Past Surgical History:   Procedure Laterality Date    HX GI      gastric bypass-2007      Social History   Substance Use Topics    Smoking status: Never Smoker    Smokeless tobacco: Never Used    Alcohol use No      Family History   Problem Relation Age of Onset    No Known Problems Mother     No Known Problems Father     No Known Problems Sister     No Known Problems Brother     No Known Problems Maternal Aunt     No Known Problems Maternal Uncle     No Known Problems Paternal Aunt     No Known Problems Paternal Uncle     No Known Problems Maternal Grandmother     No Known Problems Maternal Grandfather     No Known Problems Paternal Grandmother     No Known Problems Paternal Grandfather     No Known Problems Other       Current Outpatient Prescriptions   Medication Sig Dispense Refill    omeprazole (PRILOSEC) 20 mg capsule Take 1 Cap by mouth daily for 30 days. 30 Cap 2    FLUoxetine (PROZAC) 40 mg capsule Take 60 mg by mouth daily.  ergocalciferol (ERGOCALCIFEROL) 50,000 unit capsule Take 1 Cap by mouth two (2) times a week for 180 days.  52 Cap 1    FLUoxetine (PROZAC) 20 mg tablet TAKE 1 TABLET BY MOUTH AT BEDTIME  2    clonazePAM (KLONOPIN) 0.5 mg tablet TAKE 2 TABLET BY MOUTH EVERY DAY AS NEEDED PANIC/ANXIETY  0    L-Norgest&E Estradiol-E Estrad 0.15 mg-30 mcg (84)/10 mcg (7) 3MPk TAKE 1 TABLET BY MOUTH DAILY  2    cyanocobalamin 1,000 mcg tablet Take 1,000 mcg by mouth daily.  MULTIVITS,CA,MINERALS/IRON/FA (ONE-A-DAY WOMENS FORMULA PO) Take  by mouth.  oxyCODONE-acetaminophen (PERCOCET) 5-325 mg per tablet Take 1 Tab by mouth every four (4) hours as needed for Pain. Max Daily Amount: 6 Tabs.  30 Tab 0     No Known Allergies       Review of Systems:        General - No history or complaints of unexpected fever, chills, or weight loss  Head/Neck - No history or complaints of headache, diplopia, dysphagia, hearing loss  Cardiac - No history or complaints of chest pain, palpitations, murmur, or shortness of breath  Pulmonary - No history or complaints of shortness of breath, productive cough, hemoptysis  Gastrointestinal - No history or complaints of reflux,  abdominal pain, obstipation/constipation, blood per rectum  Genitourinary - No history or complaints of hematuria/dysuria, stress urinary incontinence symptoms, or renal lithiasis  Musculoskeletal - No history or complaints of joint pain or muscular weakness  Hematologic - No history or complaints of bleeding disorders, blood transfusions, sickle cell anemia  Neurologic - No history or complaints of  migraine headaches, seizure activity, syncopal episodes, TIA or stroke  Integumentary - No history or complaints of rashes, abnormal nevi, skin cancer  Gynecological - No history of heavy menses/abnormal menses    Objective:     Visit Vitals    /84 (BP 1 Location: Other(comment), BP Patient Position: Sitting)  Comment (BP 1 Location): left forearm    Pulse 64    Resp 16    Ht 5' 6\" (1.676 m)    Wt 125.6 kg (276 lb 14.4 oz)    SpO2 98%    BMI 44.69 kg/m2       Physical Examination: General appearance - alert, well appearing, and in no distress  Mental status - alert, oriented to person, place, and time  Eyes - pupils equal and reactive, extraocular eye movements intact  Ears - bilateral TM's and external ear canals normal  Nose - normal and patent, no erythema, discharge or polyps  Mouth - mucous membranes moist, pharynx normal without lesions  Neck - supple, no significant adenopathy  Lymphatics - no palpable lymphadenopathy, no hepatosplenomegaly  Chest - clear to auscultation, no wheezes, rales or rhonchi, symmetric air entry  Heart - normal rate, regular rhythm, normal S1, S2, no murmurs, rubs, clicks or gallops  Abdomen - soft, nontender, nondistended, no masses or organomegaly  Back exam - full range of motion, no tenderness, palpable spasm or pain on motion  Neurological - alert, oriented, normal speech, no focal findings or movement disorder noted  Musculoskeletal - no joint tenderness, deformity or swelling  Extremities - peripheral pulses normal, no pedal edema, no clubbing or cyanosis  Skin - normal coloration and turgor, no rashes, no suspicious skin lesions noted    Labs / Preoperative Evaluations:     Recent Results (from the past 1008 hour(s))   EKG, 12 LEAD, INITIAL    Collection Time: 05/07/18 12:20 PM   Result Value Ref Range    Ventricular Rate 76 BPM    Atrial Rate 76 BPM    P-R Interval 164 ms    QRS Duration 100 ms    Q-T Interval 398 ms    QTC Calculation (Bezet) 447 ms    Calculated P Axis 64 degrees    Calculated R Axis 103 degrees    Calculated T Axis 42 degrees    Diagnosis       Normal sinus rhythm  Rightward axis  Borderline ECG  When compared with ECG of 19-MAR-2018 15:34,  Nonspecific T wave abnormality has replaced inverted T waves in Inferior   leads  Confirmed by Jeanine Tilley MD. ([id number]) on 5/7/2018 12:30:16 PM  Also confirmed by Jeanine Tilley MD. ([id number]),  Charlette Tobar ([id number])    on 5/7/2018 1:19:38 PM     CBC WITH AUTOMATED DIFF    Collection Time: 05/07/18 12:29 PM   Result Value Ref Range    WBC 8.2 4.6 - 13.2 K/uL    RBC 4.91 4.20 - 5.30 M/uL    HGB 13.4 12.0 - 16.0 g/dL    HCT 41.3 35.0 - 45.0 %    MCV 84.1 74.0 - 97.0 FL    MCH 27.3 24.0 - 34.0 PG MCHC 32.4 31.0 - 37.0 g/dL    RDW 13.4 11.6 - 14.5 %    PLATELET 636 868 - 180 K/uL    MPV 8.7 (L) 9.2 - 11.8 FL    NEUTROPHILS 59 40 - 73 %    LYMPHOCYTES 36 21 - 52 %    MONOCYTES 4 3 - 10 %    EOSINOPHILS 1 0 - 5 %    BASOPHILS 0 0 - 2 %    ABS. NEUTROPHILS 4.9 1.8 - 8.0 K/UL    ABS. LYMPHOCYTES 2.9 0.9 - 3.6 K/UL    ABS. MONOCYTES 0.3 0.05 - 1.2 K/UL    ABS. EOSINOPHILS 0.1 0.0 - 0.4 K/UL    ABS. BASOPHILS 0.0 0.0 - 0.06 K/UL    DF AUTOMATED     METABOLIC PANEL, COMPREHENSIVE    Collection Time: 05/07/18 12:29 PM   Result Value Ref Range    Sodium 140 136 - 145 mmol/L    Potassium 4.1 3.5 - 5.5 mmol/L    Chloride 105 100 - 108 mmol/L    CO2 23 21 - 32 mmol/L    Anion gap 12 3.0 - 18 mmol/L    Glucose 92 74 - 99 mg/dL    BUN 12 7.0 - 18 MG/DL    Creatinine 0.78 0.6 - 1.3 MG/DL    BUN/Creatinine ratio 15 12 - 20      GFR est AA >60 >60 ml/min/1.73m2    GFR est non-AA >60 >60 ml/min/1.73m2    Calcium 8.6 8.5 - 10.1 MG/DL    Bilirubin, total 0.3 0.2 - 1.0 MG/DL    ALT (SGPT) 21 13 - 56 U/L    AST (SGOT) 14 (L) 15 - 37 U/L    Alk. phosphatase 86 45 - 117 U/L    Protein, total 7.8 6.4 - 8.2 g/dL    Albumin 3.5 3.4 - 5.0 g/dL    Globulin 4.3 (H) 2.0 - 4.0 g/dL    A-G Ratio 0.8 0.8 - 1.7         Assessment:     Morbid obesity with associated comorbidity    Plan:     laparoscopic gastric bypass surgery revision    This is a 33 y.o. female with a BMI of Body mass index is 44.69 kg/(m^2). and the weight-related co-morbidties as noted above. Ryann meets the NIH criteria for bariatric surgery based upon the BMI of Body mass index is 44.69 kg/(m^2). and multiple weight-related co-morbidties.  José Manuel Kendrick has elected laparoscopic gastric bypass as her intervention of choice for treatment of morbid obestiy through surgical means secondary to its uniform results,  profound baseline suppression of hunger and pace at which weight is lost.    In the office today, following Ryann's history and physical examination, a 40 minute discussion regarding the anatomic alterations for the laparoscopic gastric bypass  was undertaken. The dietary expectations and the patient  dependent factors for success were thoroughly discussed, to include the need for interval follow-up and long-term dietary changes associated with success. The possible short and long term  complications of the gastric bypass were also discussed, to include but not limited to;death, DVT/PE, staple line leak, bleeding, stricture formation, infection,internal hernia  and pouch dilation. Specific weight related outcomes for success were also discussed with an emphasis on careful and close follow-up with the first year and Dietary behavior modification over the first years as baseline cyclical hunger returns  The patient expressed an understanding of the above factors, and her questions were answered in their entirety. In addition, the patient attended a 1.5 hour power point seminar regarding obesity, surgical weight loss including, adjustable gastric band, gastric bypass, and sleeve gastrectomy. This discussion contrasted the different surgical techniques, mechanisms of actions and expected outcomes, and surgical and medical risks associated with each procedure. During this seminar, there was a long question and answer session where each questions was answered until there were no additional questions. Today, the patient had all of her questions answered and the decision was made today that the patient's preoperative evaluation is acceptable for them  to proceed with bariatric surgery  choosing adjustable gastric bypass as her surgical option. The patient understands the plan of action    Plan is to evaluate her anatomy in vivo and try and revise all aspects of her procedure with reduction of her gastric pouch, lengthening of her alethea limb and creation of a new anastomosis if feasible.   She understands that revision surgery is higher risk than a first time procedure. Secondary Diagnoses:     DVT / Pulmonary Embolus Risk - The patient is at a higher risk for post operative DVT / pulmonary embolus secondary to their morbid obese status, relative sedentary lifestyle, and impending general anesthetic. We will plan to use anticoagulation therapy pre and post operative as well as  pneumatic compression devices and encourage ambulation once on the hospital nursing floor. The need for possible at home anticoagulation therapy has also been discussed and any decision on this matter will be made during post operative evaluations. The patient understands that their efforts at ambulation are of vital importance to reduce the risk of this complication thus placing significant burden on them as to the prevention of such issues.  Signs and symptoms of DVT / PE have been discussed with the patient and they have been instructed to call the office if any these occur in the \"at home\" post op phase    Signed By: Lashae Yañez MD     May 7, 2018

## 2018-05-15 NOTE — PERIOP NOTES
TRANSFER - IN REPORT:    Verbal report received from [facility] (name) on Willy Aponte  being received from [phone] (unit) for routine post - op      Report consisted of patients Situation, Background, Assessment and   Recommendations(SBAR). Information from the following report(s) SBAR, Kardex, Intake/Output, MAR, Med Rec Status and Cardiac Rhythm NSR was reviewed with the receiving nurse. Opportunity for questions and clarification was provided. Assessment completed upon patients arrival to unit and care assumed.

## 2018-05-15 NOTE — BRIEF OP NOTE
BRIEF OPERATIVE NOTE    Date of Procedure: 5/15/2018   Preoperative Diagnosis: MORBID OBESITY,BMI 44,POST BARIATRIC SURGERY, FATTY LIVER  Postoperative Diagnosis: MORBID OBESITY,BMI 44,POST BARIATRIC SURGERY, FATTY LIVER    Procedure(s):  REVISION OF LAPAROSCOPIC GASTRIC BYPASS (retrocolic)  LYSIS OF ADHESIONS  MINUTES  PARTIAL GASTRECTOMY  SMALL BOWEL ANASTOMOSIS X2  SMALL BOWEL RESECTION X2  WEDGE LIVER BIOPSY  INTRAOPERATIVE ENDOSCOPY WITH BIOPSY  Surgeon(s) and Role:     * Gurwinder Beltrán MD - Primary         Surgical Assistant: Micaela Santos PA-C    Surgical Staff:  Circ-1: Tabatha Coleman RN  Circ-Relief: Hamlet Joyner RN  Physician Assistant: LEILA López  Scrub Tech-1: Lavonne Mullen  Scrub Tech-2: Minna Smirobert  Event Time In   Incision Start 0757   Incision Close 1117     Anesthesia: General   Estimated Blood Loss: 150 cc  Specimens:   ID Type Source Tests Collected by Time Destination   1 : GASTRIC CARDIA BIOPSY Preservative Stomach  Gurwinder Beltrán MD 5/15/2018 3870 Pathology   2 : [facility] LIVER BIOPSY Preservative Liver  Gurwinder Beltrán MD 5/15/2018 1269 Pathology   3 : PARTIAL GASTRECTOMY Preservative Stomach  Gurwinder Beltrán MD 5/15/2018 1028 Pathology   4 : SMALL BOWEL ANASTOMOSIS  Preservative Small Bowel  Gurwinder eBltrán MD 5/15/2018 1029 Pathology   5 : [address] Abel MD 5/15/2018 1031 Pathology      Findings: see dictation   Complications: see dictation  Implants: * No implants in log *

## 2018-05-15 NOTE — ANESTHESIA POSTPROCEDURE EVALUATION
Post-Anesthesia Evaluation and Assessment    Cardiovascular Function/Vital Signs  Visit Vitals    /69    Pulse 91    Temp 37.2 °C (99 °F)    Resp 14    Ht 5' 6\" (1.676 m)    Wt 124 kg (273 lb 7 oz)    SpO2 96%    BMI 44.13 kg/m2       Patient is status post Procedure(s):  TOTAL REVISION OF LAPAROSCOPIC GASTRIC BYPASS, LYSIS OF ADHESIONS  MINUTES,PARTIAL GASTRECTOMY, SMALL BOWEL ANASTOMOSIS X2, WEDGE LIVER BIOPSY AND INTRAOPERATIVE ENDOSCOPY WITH BIOPSY. Nausea/Vomiting: Controlled. Postoperative hydration reviewed and adequate. Pain:  Pain Scale 1: FLACC (05/15/18 1241)  Pain Intensity 1: 0 (05/15/18 1241)   Managed. Neurological Status:   Neuro (WDL): Within Defined Limits (05/15/18 1211)   At baseline. Mental Status and Level of Consciousness: Arousable. Pulmonary Status:   O2 Device: Nasal cannula (05/15/18 1204)   Adequate oxygenation and airway patent. Complications related to anesthesia: None    Post-anesthesia assessment completed. No concerns. Patient has met all discharge requirements.     Signed By: Tiffanie Kennedy MD    May 15, 2018

## 2018-05-15 NOTE — PROGRESS NOTES
PM check -      Pt awake and alert C/O expected discomfort     Mild nausea     Visit Vitals    /71 (BP 1 Location: Left arm, BP Patient Position: At rest)    Pulse (!) 102  Comment: Nurse made aware    Temp 98.6 °F (37 °C)    Resp 14    Ht 5' 6\" (1.676 m)    Wt 124 kg (273 lb 7 oz)    SpO2 96%    BMI 44.13 kg/m2       RAMONITA with serosang output       Plan:  -Continue medications  -Encourage ambulation  -SCD use when in bed  -IS 10 times an hour  -NPO  -UGI and start diet in AM

## 2018-05-15 NOTE — PROGRESS NOTES
Transition of Care (ILDA) Plan:        Chart reviewed. Pt admitted for an elective surgical procedure. Pt is independent. Please encourage ambulation. No plan of care needs identified. Anticipate pt will be medically stable for discharge within the next 24-48 hours. CM available to assist as needed. ILDA Transportation:   How is patient being transported at discharge? Family/Friend      When? Once cleared by physician     Is transport scheduled? N/A      Follow-up appointment and transportation:   PCP/Specialist?  See AVS for Appointment         Who is transporting to the follow-up appointment? Self/Family/Friend      Is transport for follow up appointment scheduled? N/A    Communication plan (with patient/family): Who is being called? Patient or Next of Kin? Responsible party? Patient      What number(s) is to be used? See Facesheet      What service provider is calling for [facility] services? When are they calling? Readmission Risk? (Green/Low; Yellow/Moderate; Red/High):  Green      Care Management Interventions  Mode of Transport at Discharge:  Other (see comment) (spouse)  Transition of Care Consult (CM Consult): Discharge Planning  Health Maintenance Reviewed: Yes  Current Support Network: Lives with Spouse  Confirm Follow Up Transport: Self  Discharge Location  Discharge Placement: Home with family assistance

## 2018-05-15 NOTE — PERIOP NOTES
Verbal hand off at the bedside with Savannah Werner RN provided opportunity for questions. Patient is uncomfortable skin assessment deferred at this time.

## 2018-05-16 ENCOUNTER — APPOINTMENT (OUTPATIENT)
Dept: GENERAL RADIOLOGY | Age: 28
DRG: 630 | End: 2018-05-16
Attending: SPECIALIST
Payer: COMMERCIAL

## 2018-05-16 VITALS
RESPIRATION RATE: 18 BRPM | HEART RATE: 123 BPM | HEIGHT: 66 IN | DIASTOLIC BLOOD PRESSURE: 75 MMHG | SYSTOLIC BLOOD PRESSURE: 129 MMHG | TEMPERATURE: 98.8 F | BODY MASS INDEX: 45.05 KG/M2 | OXYGEN SATURATION: 100 % | WEIGHT: 280.3 LBS

## 2018-05-16 PROCEDURE — 74011250636 HC RX REV CODE- 250/636: Performed by: SPECIALIST

## 2018-05-16 PROCEDURE — 74011636320 HC RX REV CODE- 636/320: Performed by: SPECIALIST

## 2018-05-16 PROCEDURE — 74011250637 HC RX REV CODE- 250/637: Performed by: PHYSICIAN ASSISTANT

## 2018-05-16 PROCEDURE — 74240 X-RAY XM UPR GI TRC 1CNTRST: CPT

## 2018-05-16 PROCEDURE — 77010033678 HC OXYGEN DAILY

## 2018-05-16 PROCEDURE — 74011250636 HC RX REV CODE- 250/636: Performed by: PHYSICIAN ASSISTANT

## 2018-05-16 PROCEDURE — 51798 US URINE CAPACITY MEASURE: CPT

## 2018-05-16 PROCEDURE — 77030027138 HC INCENT SPIROMETER -A

## 2018-05-16 RX ORDER — KETOROLAC TROMETHAMINE 30 MG/ML
30 INJECTION, SOLUTION INTRAMUSCULAR; INTRAVENOUS
Status: DISCONTINUED | OUTPATIENT
Start: 2018-05-16 | End: 2018-05-16 | Stop reason: HOSPADM

## 2018-05-16 RX ORDER — OXYCODONE AND ACETAMINOPHEN 5; 325 MG/1; MG/1
1 TABLET ORAL
Status: DISCONTINUED | OUTPATIENT
Start: 2018-05-16 | End: 2018-05-16

## 2018-05-16 RX ORDER — OXYCODONE AND ACETAMINOPHEN 5; 325 MG/1; MG/1
1-2 TABLET ORAL
Status: DISCONTINUED | OUTPATIENT
Start: 2018-05-16 | End: 2018-05-16 | Stop reason: HOSPADM

## 2018-05-16 RX ADMIN — MORPHINE SULFATE 8 MG: 10 INJECTION INTRAVENOUS at 00:24

## 2018-05-16 RX ADMIN — ENOXAPARIN SODIUM 40 MG: 40 INJECTION SUBCUTANEOUS at 14:07

## 2018-05-16 RX ADMIN — MORPHINE SULFATE 8 MG: 10 INJECTION INTRAVENOUS at 04:13

## 2018-05-16 RX ADMIN — OXYCODONE HYDROCHLORIDE AND ACETAMINOPHEN 1 TABLET: 5; 325 TABLET ORAL at 10:49

## 2018-05-16 RX ADMIN — SODIUM CHLORIDE, SODIUM LACTATE, POTASSIUM CHLORIDE, AND CALCIUM CHLORIDE 150 ML/HR: 600; 310; 30; 20 INJECTION, SOLUTION INTRAVENOUS at 04:13

## 2018-05-16 RX ADMIN — MORPHINE SULFATE 8 MG: 10 INJECTION INTRAVENOUS at 07:02

## 2018-05-16 RX ADMIN — ACETAMINOPHEN 1000 MG: 10 INJECTION, SOLUTION INTRAVENOUS at 01:34

## 2018-05-16 RX ADMIN — ONDANSETRON 4 MG: 2 INJECTION INTRAMUSCULAR; INTRAVENOUS at 09:22

## 2018-05-16 RX ADMIN — OXYCODONE HYDROCHLORIDE AND ACETAMINOPHEN 2 TABLET: 5; 325 TABLET ORAL at 14:07

## 2018-05-16 RX ADMIN — IOHEXOL 100 ML: 240 INJECTION, SOLUTION INTRATHECAL; INTRAVASCULAR; INTRAVENOUS; ORAL at 08:20

## 2018-05-16 RX ADMIN — ONDANSETRON 4 MG: 2 INJECTION INTRAMUSCULAR; INTRAVENOUS at 14:54

## 2018-05-16 RX ADMIN — ENOXAPARIN SODIUM 40 MG: 40 INJECTION SUBCUTANEOUS at 01:11

## 2018-05-16 RX ADMIN — KETOROLAC TROMETHAMINE 30 MG: 30 INJECTION, SOLUTION INTRAMUSCULAR at 09:01

## 2018-05-16 RX ADMIN — PROMETHAZINE HYDROCHLORIDE 12.5 MG: 25 INJECTION INTRAMUSCULAR; INTRAVENOUS at 01:11

## 2018-05-16 NOTE — PROGRESS NOTES
Bariatric Surgery                POD #1    Visit Vitals    /81 (BP 1 Location: Left arm, BP Patient Position: At rest)    Pulse 97    Temp 98.9 °F (37.2 °C)    Resp 15    Ht 5' 6\" (1.676 m)    Wt 127.1 kg (280 lb 4.8 oz)    SpO2 95%    BMI 45.24 kg/m2     Patient has complaints of pain and is asking for a different pain medication, minimal nausea noted     Exam:  Appears well in no distress  Lungs- clear bilaterally  Abd - soft, incisions look good without erythema           RAMONITA with minimal serosanguinous output  Extremities- no new edema or swelling    UGI - pending    Data Review:    Labs: Results:       Chemistry No results for input(s): GLU, NA, K, CL, CO2, BUN, CREA, CA, AGAP, BUCR, TBIL, GPT, AP, TP, ALB, GLOB, AGRAT in the last 72 hours. CBC w/Diff No results for input(s): WBC, RBC, HGB, HCT, PLT, GRANS, LYMPH, EOS, RETIC, HGBEXT, HCTEXT, PLTEXT in the last 72 hours. Coagulation No results for input(s): PTP, INR, APTT in the last 72 hours. No lab exists for component: INREXT    Liver Enzymes No results for input(s): TP, ALB, TBIL, AP, SGOT, GPT in the last 72 hours. No lab exists for component: DBIL       Assessment/Plan: S/P  revision of Laparoscopic Gastric Bypass - doing well without any issues    Orders are pending until UGI study shows normal anatomy. 1.Start bariatric diet and protein shakes  2. D/C IV pain meds and start PO pain meds  3. D/C RAMONITA drain  4. Likely PM D/C if continues to be okay and tolerates PO

## 2018-05-16 NOTE — DISCHARGE INSTRUCTIONS
[facility]  Jimmy Mcclendon. Lexi Dove M.D., F.A.C.S.  [address] [place] [address]., [address] [place] [address], [address] [place] [address]  Office: [phone]    Fax:  [phone]    Discharge Instruction for Gastric Bypass Patients    Diet:   Continue with the liquid diet until you are seen in the office. Make sure you sip fluids all day. Goal for total fluid intake is a minimum of 64 ounces per day.  Aim for  Grams of protein every day. Occasionally protein shakes with whey protein will cause diarrhea after surgery due to newly developed lactose intolerance. If you experience this, there are other protein drinks on the market that do not use milk proteins such as whey. Activity:   Get up and walk at least once an hour during normal waking hours.  Walk a minimum of 30 minutes every day for exercise. Rest when you are tired.  Use your Incentive Spirometry (plastic breathing machine) 10 times every hour for two weeks. Take a slow steady breath in until you can not inhale anymore.  You may shower. No baths, hot tubs or swimming.  You may climb stairs. Take your time.  No lifting more than 10-15 lbs.  If you feel discomfort during an activity, rest.   Do not drive for 1 week or until you are off of all narcotics. Wound Care:   Clean incisions with soap and water when in the shower. Pat dry.  Leave steri-strips on until they fall off.  A small amount of drainage may be present from the incisions. Contact the office if you notice an increase in drainage, an odor, increased redness, or fever > 100.5. Medications:   You should have received a prescription for pain medication and Prilosec prior to surgery, if not, notify Dr. Dot Davila team.   Fredrich Last every day until your prescription runs out. It helps the healing process and helps prevent you from having the hiccups and acid reflux.     For upset stomach you may take over the counter medications such as Maalox, Mylanta, or Pepto Bismol.  Gas X works very well for bloating when eating or drinking.  You may take Tylenol. Do not exceed 4,000mg of Tylenol in one day. Percocet has Tylenol in it, you will need to consider this when taking Tylenol and Percocet together.  Non-aspirin based arthritis medications may be resumed.  Take a childrens or adult chewable multivitamin.  Milk of Magnesia will provide immediate relief of constipation (not for daily use). Daily use of Miralax or Benefiber may be needed if you develop chronic constipation.  It is fine to take your usual home medications. Blood pressure medications should be continued after surgery, unless it is a diuretic. Diabetic medications can frequently be reduced very quickly after surgery. Diabetics should continue to monitor blood sugars frequently after surgery and contact the prescribing physician for any questions. Follow-Up Appointment:   If you do not already have a follow-up appointment scheduled, contact the office in the next few days to obtain one. It is usually scheduled for 10-14 days after your surgery date. Office phone number:  [phone].  Call our office if you have questions, concerns or any worsening of condition. If you are not able to reach us, go to your primary care provider or the Emergency Department.

## 2018-05-16 NOTE — ROUTINE PROCESS
Bedside shift change report given to Jacob Mclean RN (oncoming nurse) by Andrew Alonso RN (offgoing nurse). Report included the following information SBAR, Kardex, Intake/Output, MAR, Recent Results and Alarm Parameters .

## 2018-05-16 NOTE — OP NOTES
[facility] [place]  OPERATIVE REPORT    Carlota Ashley  MR#: [id number]  : 1990  ACCOUNT #: [de-identified]   DATE OF SERVICE: 05/15/2018    PREOPERATIVE DIAGNOSIS:  Persistent morbid obesity, status post gastric bypass procedure with dilated gastric pouch. POSTOPERATIVE DIAGNOSIS:    1. Persistent morbid obesity, status post gastric bypass procedure with dilated gastric pouch. 2.  Massive intra-abdominal adhesions due to prior gastric bypass procedure with a Maria Luz limb which was retrocolic and retrogastric in location. 3.  Severe fatty infiltration of the liver. PROCEDURE PERFORMED:  1. Laparoscopy with lysis of adhesions of greater than 120 minutes duration. 2.  Small bowel resections x2.  3.  Small bowel anastomoses x2.  4.  Revision of gastric bypass procedure performed laparoscopically with a 150 cm Maria Luz limb bypass in a retrocolic antegastric fashion. 5.  Partial gastrectomy. 6.  Wedge liver biopsy. 7.  Intraoperative endoscopy with biopsy. SURGEON:  Brittney Dejesus    ASSISTANT:  DORA Caban assisted with extensive enterolysis of adhesions as noted above. He assisted with creation of the new small bowel anastomosis and resection of such. He also assisted with partial gastrectomy and revision of gastric bypass procedure. He also assisted with the fascial and skin closures. ANESTHESIA:  General endotracheal.    COMPLICATIONS:  None. ESTIMATED BLOOD LOSS:  Approximately 100 mL. SPECIMENS REMOVED:  1. Partial gastrectomy resection specimen. 2.  Small bowel resection specimens. 3.  Gastrojejunal anastomotic specimen. 4.  Wedge liver biopsy specimen. 5.  Intraoperative endoscopy with biopsy specimen. IMPLANTS:  none     STATEMENT OF MEDICAL NECESSITY:  The patient is a 27-year-old female who at the very early age of [address] underwent a laparoscopic gastric bypass procedure performed by Dr. Victorino Shook at [facility].   She initially lost some weight, but then probably gained it back, and she presented to me in consultation for her weight regain and sensation that she had no restriction at all. We started out her evaluation with upper GI study, and that clearly showed that she had a gastric pouch which was significantly larger than normal.  We then performed endoscopy on the patient, this endoscopy confirmed the same, and my feeling was that her pouch was at least 2-1/2 times normal size, or maybe slightly larger than this, and that she might be a candidate for revisional surgery. She went into the operating room today understanding that despite all of our testing, we might get into her operation and feel like she is not a candidate for revision, and we would have to back out in that case. She did understand also that the concept of revisional surgery is significantly higher risk than a first time, procedure and she was comfortable with that risk. OPERATIVE PROCEDURE:  The patient was brought to the operating room and placed on table in supine position, at which time general anesthesia was administered without any difficulty. Her abdomen was then prepped and draped in usual sterile fashion. Using a 15 blade, a 1 cm incision made just above the umbilicus. Veress needle approach was used to connect the peritoneal cavity which was then insufflated. A Visiport was then placed at that site, and then 4 additional trocars placed in usual U-shaped figuration. On entering the abdomen, the first thing I noted was the patient had very poor distention of her abdominal wall, despite maximal relaxation. This created a very tight operating environment, unfortunately,  In her lower abdomen there were no adhesions, but immediately I noted that I could not visualize her Maria Luz limb at all, which meant that the Maria Luz limb was retrocolic and retrogastric in its location.   I began the adhesiolysis, freeing up the left subhepatic space first, being able now to place a Anupama retractor. I then opened up the gastrocolic omentum and I began what was an exceptionally arduous dissection in the retrogastric space. I had to free up the entire Maria Luz limb off of the posterior wall of the stomach. I had to free up the gastric pouch off the posterior wall of the stomach, I had to then free up the lateral aspect of the Maria Luz limb, which was folded back way lateral to the pancreas in the splenic hilum, and this very arduous adhesiolysis of this portion of the operation took well in excess of 90 minutes to simply free up the Maria Luz limb from that retrogastric location. I then elevated the transverse colon, and I had to free up the Maria Luz limb circumferentially along with the jejunojejunostomy anastomosis. This took the remainder of the 30 additional minutes, and we were well past 2 hours just performing adhesiolysis of the procedure. On inspection of the pouch, I placed a Baker's tube in the stomach and I inflated the balloon to 30 mL of saline solution. It was clear. The pouch was about 2-1/2 times normal size, and we would be able to revise her operation. At various aspects of the dissection, I noted that the Maria Luz limb free end was packed along the pouch, which was very unusual, and I had to free this up, and this took quite some time also. I went ahead and addressed the small bowel portion of the anastomosis, I measured out what was the full length of her Maria Luz limb, and it was only about 40-50 cm in length, which is very unusual.  The anastomosis was likely a double staple anastomosis and it was very patulous. I knew I would have to completely resect this anastomosis and recreate her Maria Luz limb anatomy. Therefore, using the Endo DOLORES stapler with white reloads. I transected at the Maria Luz limb just above this anastomosis, at the afferent limb just lateral to this anastomosis, and the efferent limb just inferior to this anastomosis. In doing so, I slid out this anastomosis.   I then took down the mesentery using concentric firings of the Endo GI stapler and I removed the specimen from the operative field. What I was left with now a detached efferent limb, and a Maria Luz limb which can now be placed in continuity with the efferent limb. I placed these 2 portions of bowel in a side-to-side fashion, placing stay sutures in the 2 limbs of the bowel. I then created enterotomies. I placed the stapler device in into the lumen and then fired it, creating the anastomosis, which was hemostatic. The free margin of the enterotomy was then reapproximated using #2 Ethilon suture, and these sutures were held up to facilitate closure using the stapling device. The mesenteric defect was closed using interrupted 2-0 Ethibond suture. At this juncture of the operation, the Maria Luz limb was now in continuity with the efferent limb. The afferent limb itself was completely detached. I went ahead and measured off at a 150 cm Maria Luz limb type bypass, and I placed the afferent limb at this position, placing stay sutures   in that location. I then created enterotomies. I then placed the stapling device intraluminally and fired it, creating the anastomosis. The anastomosis itself was hemostatic. The free margin of the enterotomy was then reapproximated using 2-0 Ethibond suture, and I then used the stapling device to close this enterotomy. The mesenteric defect was closed using running #2 Ethibond suture. At this juncture, what I was left with was essentially a long Maria Luz limb wow, with the afferent limb attached at the 150 cm point, and the Maria Luz limb itself was still attached to the gastric pouch, which was quite enlarged. I then turned my attention to downsizing the gastric pouch and creating new anastomosis. Therefore, I turned my attention to the upper abdominal portion of the operation where I used a GI stapler and I transected the Maria Luz limb off of the gastric pouch just proximal to the anastomosis.   The mesentery of that detached portion was then taken down using the GI stapler. I allowed the tiny portion of the Maria Luz limb which was still attached to the pouch and I demarcate. I could clearly see the demarcation. I then created an enterotomy in that necrotic small bowel, and I placed the cap of a 21 ILS stapler through this enterotomy, I brought it out through the anterior gastric pouch in the area where we planned the new anastomosis, and I retrieved it. We placed a pursestring suture at the base. At this juncture, I then noted that in order to perform this aspect of the operation I would actually have to mobilize the entire gastric remnant, taking down the lateral attachments off the splenic hilum so that I could pull the gastric remnant medially. With the cap in place in the pouch, I then used an [place] stapler with green reloads. I fired along the base of the new planned pouch, which was just above the old anastomosis, and then in a vertical fashion I fired 2 firings vertically to complete the new pouch at the left crural region. This gastrojejunal anastomosis was removed from the operative field. What I was left with now was a new gastric pouch with the circular stapler cap in the pouch itself. I then brought the Maria Luz limb in a retrocolic fashion through the old defect which was created previously. I then opened the free end of the Maria Luz limb using Harmonic scalpel. I got a circular stapler device transabdominally through the Maria Luz limb and then deployed the sphere through the anterior mesenteric port of the bowel. It was then attached to the cap, closed, and fired, creating the new gastrojejunal anastomosis in a circular fashion. Two very good tissue rings were noted within the stapling device, and the free margin of the Maria Luz limb was then trimmed free using the Endo DOLORES stapler with white reloads.   I then oversewed the anastomosis using #2 Ethibond suture, and we tested the pouch using dilute methylene blue. It was noted to be completely watertight. At this juncture, the gastric remnant, which we had mobilized, which was very difficult earlier in the case, had a necrotic tip at the most apical portion. Therefore, I performed a partial gastrectomy using a GI stapler with green reloads and submitted that gastric specimen to the pathology. At this juncture, all areas were hemostatic. I turned my attention to the head of the table, where I performed endoscopy on the patient. The patient's oropharynx was normal.  Esophagus was normal.  Her new gastric pouch was completely viable and the gastrojejunal anastomosis was nonbleeding. Biopsy was taken of the gastric cardia and submitted for H. pylori assessment. I went back to the abdominal portion of the operation, to make sure all areas were hemostatic. I then had to tack the new Maria Luz limb to the mesocolon and transverse colon. I did so using running #2 Ethibond suture to keep it in place. What I was left with now was a new gastric pouch, a new Maria Luz limb which was 150 cm in length, and the new efferent limb attached to the Maria Luz limb distally. All areas were   hemostatic. We used Evicel along all staple lines. It should be noted that she did ooze considerably throughout the entire case, probably due to all the chronic adhesions that were present in her upper abdominal region. I used Surgicel SNoW along all staple lines, and I was satisfied with hemostasis at this point. The liver retractor was then removed, and due to enlargement of her liver I did biopsy the liver in a wedge-shaped fashion, submitted to pathology for permanent section. With this all having been done, I then closed the left lower quadrant trocar site using a transabdominal 0 PDS suture along the fascia, a RAMONITA drain was then placed in the upper abdomen, and all skin incisions were then closed using 4-0 subcuticular Monocryl. Steri-Strips and dressings were applied.   The patient tolerated the procedure well.       Jigna Ellis       AT / Stuart Ahumada  D: 05/16/2018 08:39     T: 05/16/2018 09:47  JOB #: [id number]

## 2018-05-16 NOTE — PROGRESS NOTES
Patient went to [facility] at this time. Ambulated to the bathroom prior to leaving unit. Family at bedside. 0920hrs Patient complained of pain and nausea. Toradol 30mg IVP and Zofran 4 mg IVP given at this time. 0950hrs RAMONITA drain removed per MD order at this time. Patient was advanced to bariatric liquid diet. She walked in the hallway and tolerated well. Had sips of water, denies pain or nausea at this time. 1038hrs Patient complained of pain to the abdomen. Percocet 5-325mg PO x1 given pill at this time. Bladder scan done, 950mL scanned. Dr Lexi Dove at bedside, orders to do a in-out shen catheter in 2 hrs. Discharge after patient voids by herself. 1210hrs In and out catheter inserted; removed 600 mL of geno color urine. 1413hrs Patient complained of pain to the abdomen. Percocet 5-325mg PO x2 given pill at this time. 1425hrs Patient complained of pain and nausea. Zofran 4 mg IVP given at this time. 1800hrs Patient voided at this time. Discharge education reviewed by Brett Lawrence RN. Discharge education given, family at bedside. IV catheter removed, no bleeding, signs of infiltration, pain, edema to the site, tip intact. Patient was escorted to the main entrance via wheelchair. All questions answered.

## 2018-05-16 NOTE — DISCHARGE SUMMARY
Discharge Summary    Patient: Rom Luke               Sex: female          DOA: 5/15/2018         YOB: 1990      Age:  33 y.o.        LOS:  LOS: 1 day                Admit Date: 5/15/2018    Discharge Date: 5/16/2018    Admission Diagnoses: MORBID OBESITY,BMI 44,POST BARIATRIC SURGERY  Morbid obesity with BMI of 40.0-44.9, adult [facility])    Discharge Diagnoses:    Problem List as of 5/16/2018  Date Reviewed: 5/15/2018          Codes Class Noted - Resolved    Psychotic disorder ICD-10-CM: F29  ICD-9-CM: 298.9  Unknown - Present        Dilation of stomach ICD-10-CM: K31.89  ICD-9-CM: 536.8  Unknown - Present        * (Principal)Morbid obesity with BMI of 40.0-44.9, adult ([facility] ICD-10-CM: E66.01, Z68.41  ICD-9-CM: 278.01, V85.41  Unknown - Present        Morbid obesity ([facility] ICD-10-CM: E66.01  ICD-9-CM: 278.01  Unknown - Present        Depression ICD-10-CM: F32.9  ICD-9-CM: 311  Unknown - Present        Borderline diabetes ICD-10-CM: R73.03  ICD-9-CM: 790.29  Unknown - Present        Intestinal malabsorption ICD-10-CM: K90.9  ICD-9-CM: 579.9  Unknown - Present        Iron deficiency anemia secondary to inadequate dietary iron intake ICD-10-CM: D50.8  ICD-9-CM: 280.1  12/21/2016 - Present        B12 deficiency ICD-10-CM: E53.8  ICD-9-CM: 266.2  12/21/2016 - Present        Morbid obesity due to excess calories ([facility] ICD-10-CM: E66.01  ICD-9-CM: 278.01  7/30/2016 - Present        H/O gastric bypass ICD-10-CM: Z98.84  ICD-9-CM: V45.86  7/30/2016 - Present              Discharge Condition: Good    Hospital Course: The patient underwent  revision of laparoscopic gastric bypass  on 5/15/2018. The patient tolerated the procedure well. Vital signs remained stable and the patient was transferred to  3rd floor surgical unit without complications. The patient remained stable throughout the first night post operatively with stable vital signs and adequate urine output and pain control.  Pain was controlled with Dilaudid IV and IV Tylenol . The patient on the first morning post operative was transferred to the radiology suite where they underwent a gastrograffin UGI study which showed no evidence of a leak or stricture. The drain was discontinued on POD # 1 and the patient was started on a bariatric liquid diet with protein shakes. The patient progressed throughout the day and was ambulating well and tolerating their diet. They were therefore discharged home with instructions to notify me with any issues that may arise. Significant Diagnostic Studies:   No results for input(s): HGB, HGBEXT in the last 72 hours. No results for input(s): HCT, HCTEXT in the last 72 hours. Current Discharge Medication List      CONTINUE these medications which have NOT CHANGED    Details   omeprazole (PRILOSEC) 20 mg capsule Take 1 Cap by mouth daily for 30 days. Qty: 30 Cap, Refills: 2      oxyCODONE-acetaminophen (PERCOCET) 5-325 mg per tablet Take 1 Tab by mouth every four (4) hours as needed for Pain. Max Daily Amount: 6 Tabs. Qty: 30 Tab, Refills: 0    Associated Diagnoses: Postoperative pain      FLUoxetine (PROZAC) 40 mg capsule Take 60 mg by mouth nightly. clonazePAM (KLONOPIN) 0.5 mg tablet TAKE 2 TABLET BY MOUTH EVERY DAY AS NEEDED PANIC/ANXIETY  Refills: 0      L-Norgest&E Estradiol-E Estrad 0.15 mg-30 mcg (84)/10 mcg (7) 3MPk TAKE 1 TABLET BY MOUTH DAILY  Refills: 2      MULTIVITS,CA,MINERALS/IRON/FA (ONE-A-DAY WOMENS FORMULA PO) Take  by mouth. STOP taking these medications       ergocalciferol (ERGOCALCIFEROL) 50,000 unit capsule Comments:   Reason for Stopping:         cyanocobalamin 1,000 mcg tablet Comments:   Reason for Stopping:               Activity: activity as tolerated with no heavy lifting of greater than 20 pounds. No anti- inflammatory medications. Use stool softeners at home as needed while taking pain medications since they are constipating.     Diet: Bariatric liquid diet    Wound Care: Keep wound clean and dry, Reinforce dressing PRN and ice to area for comfort. Do not get wound wet for 2 days.     Follow-up: 14 days with Dr Codie Roger M.D

## 2018-05-16 NOTE — PROGRESS NOTES
0010- Pt c/o of pain. Pain medication not due yet. Pt encouraged to walk. Pt states that morphine is not helping and she doesn't understand why she is in so much pain. J/P drain with serosanguinous drainage. Lap sites with serosanguinous drainage. No s/s of infection noted. Temp 98.1  0015- Pt attempted to walk but stated she was in too much pain. 0530- Child catheter removed. 7799- Pt given K-Pad for back pain. Shift Summary- Pt ambulated twice during the night,. J/P dressing changed twice for leakage around drain, serosanguinous in color. Lap sites with small amount of drainage. Pt medicated for pain and nausea during the night.        Patient Vitals for the past 12 hrs:   Temp Pulse Resp BP SpO2   05/16/18 0315 98.9 °F (37.2 °C) 97 15 125/81 95 %   05/15/18 2340 99.2 °F (37.3 °C) 100 15 136/77 96 %   05/15/18 1948 98.4 °F (36.9 °C) (!) 101 15 130/70 96 %

## 2018-05-16 NOTE — PROGRESS NOTES
Bariatric Surgery                POD #1    Visit Vitals    /75 (BP 1 Location: Left arm, BP Patient Position: Sitting)    Pulse (!) 123    Temp 98.8 °F (37.1 °C)    Resp 18    Ht 5' 6\" (1.676 m)    Wt 127.1 kg (280 lb 4.8 oz)    SpO2 100%    BMI 45.24 kg/m2     Patient has minimal complaints of pain, minimal nausea noted     Exam:  Appears well in no distress  Lungs- clear bilaterally  Abd - soft, incisions look good without erythema           RAMONITA with minimal serosanguinous output  Extremities- no new edema or swelling    UGI - no obstrustion or leak    Data Review:    Labs: Results:       Chemistry No results for input(s): GLU, NA, K, CL, CO2, BUN, CREA, CA, AGAP, BUCR, TBIL, GPT, AP, TP, ALB, GLOB, AGRAT in the last 72 hours. CBC w/Diff No results for input(s): WBC, RBC, HGB, HCT, PLT, GRANS, LYMPH, EOS, RETIC, HGBEXT, HCTEXT, PLTEXT in the last 72 hours. Coagulation No results for input(s): PTP, INR, APTT in the last 72 hours. No lab exists for component: INREXT    Liver Enzymes No results for input(s): TP, ALB, TBIL, AP, SGOT, GPT in the last 72 hours. No lab exists for component: DBIL       Assessment/Plan: S/P revision of prior laparoscopic gastric bypass surgery - doing well without any issues aside from not urinating post shen removal    1.  Agree with D/C if urinary issues resolve

## 2018-05-17 ENCOUNTER — TELEPHONE (OUTPATIENT)
Dept: SURGERY | Age: 28
End: 2018-05-17

## 2018-05-17 NOTE — TELEPHONE ENCOUNTER
I left a message for patient to call me back. I was calling to check on her since she was discharged from the hospital yesterday. She called back and left a message stating that she is doing well. Her pain is well controlled. She has already drank 1 bottle of water and 2 protein shakes today. She does not think she will have a problem getting in the grams of protein or ounces of fluids. She stated she is using IS and has been walking once an hour as directed. Drain site has stopped draining as well. She was able to shower when she arrived home last night and is feeling more like herself.

## 2018-05-30 ENCOUNTER — OFFICE VISIT (OUTPATIENT)
Dept: SURGERY | Age: 28
End: 2018-05-30

## 2018-05-30 ENCOUNTER — APPOINTMENT (OUTPATIENT)
Dept: GENERAL RADIOLOGY | Age: 28
DRG: 392 | End: 2018-05-30
Attending: SPECIALIST
Payer: COMMERCIAL

## 2018-05-30 ENCOUNTER — HOSPITAL ENCOUNTER (INPATIENT)
Age: 28
LOS: 2 days | Discharge: HOME | DRG: 392 | End: 2018-06-01
Attending: SPECIALIST | Admitting: SPECIALIST
Payer: COMMERCIAL

## 2018-05-30 ENCOUNTER — HOSPITAL ENCOUNTER (OUTPATIENT)
Age: 28
Setting detail: OUTPATIENT SURGERY
Discharge: STILL ADMITTED OR ADMINISTRATIVE | DRG: 392 | End: 2018-05-30
Attending: SPECIALIST | Admitting: SPECIALIST
Payer: COMMERCIAL

## 2018-05-30 VITALS
OXYGEN SATURATION: 98 % | DIASTOLIC BLOOD PRESSURE: 75 MMHG | TEMPERATURE: 97.4 F | BODY MASS INDEX: 41.66 KG/M2 | HEIGHT: 66 IN | WEIGHT: 259.2 LBS | HEART RATE: 112 BPM | SYSTOLIC BLOOD PRESSURE: 118 MMHG

## 2018-05-30 VITALS
HEART RATE: 108 BPM | WEIGHT: 257.5 LBS | RESPIRATION RATE: 18 BRPM | TEMPERATURE: 98 F | SYSTOLIC BLOOD PRESSURE: 148 MMHG | OXYGEN SATURATION: 98 % | BODY MASS INDEX: 41.38 KG/M2 | HEIGHT: 66 IN | DIASTOLIC BLOOD PRESSURE: 99 MMHG

## 2018-05-30 DIAGNOSIS — E66.01 MORBID OBESITY (HCC): ICD-10-CM

## 2018-05-30 DIAGNOSIS — Z98.84 H/O GASTRIC BYPASS: ICD-10-CM

## 2018-05-30 DIAGNOSIS — K90.9 INTESTINAL MALABSORPTION, UNSPECIFIED TYPE: Primary | ICD-10-CM

## 2018-05-30 LAB
ANION GAP SERPL CALC-SCNC: 12 MMOL/L (ref 3–18)
BUN SERPL-MCNC: 17 MG/DL (ref 7–18)
BUN/CREAT SERPL: 21 (ref 12–20)
CALCIUM SERPL-MCNC: 8.3 MG/DL (ref 8.5–10.1)
CHLORIDE SERPL-SCNC: 106 MMOL/L (ref 100–108)
CO2 SERPL-SCNC: 22 MMOL/L (ref 21–32)
CREAT SERPL-MCNC: 0.82 MG/DL (ref 0.6–1.3)
ERYTHROCYTE [DISTWIDTH] IN BLOOD BY AUTOMATED COUNT: 13.6 % (ref 11.6–14.5)
GLUCOSE SERPL-MCNC: 86 MG/DL (ref 74–99)
HCT VFR BLD AUTO: 42 % (ref 35–45)
HGB BLD-MCNC: 13.8 G/DL (ref 12–16)
LIPASE SERPL-CCNC: 388 U/L (ref 73–393)
MAGNESIUM SERPL-MCNC: 2.1 MG/DL (ref 1.6–2.6)
MCH RBC QN AUTO: 27.4 PG (ref 24–34)
MCHC RBC AUTO-ENTMCNC: 32.9 G/DL (ref 31–37)
MCV RBC AUTO: 83.3 FL (ref 74–97)
PLATELET # BLD AUTO: 328 K/UL (ref 135–420)
PMV BLD AUTO: 8.9 FL (ref 9.2–11.8)
POTASSIUM SERPL-SCNC: 4 MMOL/L (ref 3.5–5.5)
RBC # BLD AUTO: 5.04 M/UL (ref 4.2–5.3)
SODIUM SERPL-SCNC: 140 MMOL/L (ref 136–145)
WBC # BLD AUTO: 10.5 K/UL (ref 4.6–13.2)

## 2018-05-30 PROCEDURE — 74240 X-RAY XM UPR GI TRC 1CNTRST: CPT

## 2018-05-30 PROCEDURE — 80048 BASIC METABOLIC PNL TOTAL CA: CPT | Performed by: SPECIALIST

## 2018-05-30 PROCEDURE — 85027 COMPLETE CBC AUTOMATED: CPT | Performed by: SPECIALIST

## 2018-05-30 PROCEDURE — 74011250636 HC RX REV CODE- 250/636: Performed by: SPECIALIST

## 2018-05-30 PROCEDURE — 74011000255 HC RX REV CODE- 255: Performed by: SPECIALIST

## 2018-05-30 PROCEDURE — 43999 UNLISTED PROCEDURE STOMACH: CPT | Performed by: SPECIALIST

## 2018-05-30 PROCEDURE — 83735 ASSAY OF MAGNESIUM: CPT | Performed by: SPECIALIST

## 2018-05-30 PROCEDURE — 36415 COLL VENOUS BLD VENIPUNCTURE: CPT | Performed by: SPECIALIST

## 2018-05-30 PROCEDURE — 83690 ASSAY OF LIPASE: CPT | Performed by: SPECIALIST

## 2018-05-30 PROCEDURE — 74018 RADEX ABDOMEN 1 VIEW: CPT

## 2018-05-30 PROCEDURE — 65270000029 HC RM PRIVATE

## 2018-05-30 RX ORDER — DIPHENHYDRAMINE HYDROCHLORIDE 50 MG/ML
25 INJECTION, SOLUTION INTRAMUSCULAR; INTRAVENOUS
Status: DISCONTINUED | OUTPATIENT
Start: 2018-05-30 | End: 2018-05-31

## 2018-05-30 RX ORDER — SODIUM CHLORIDE, SODIUM LACTATE, POTASSIUM CHLORIDE, CALCIUM CHLORIDE 600; 310; 30; 20 MG/100ML; MG/100ML; MG/100ML; MG/100ML
150 INJECTION, SOLUTION INTRAVENOUS CONTINUOUS
Status: DISCONTINUED | OUTPATIENT
Start: 2018-05-30 | End: 2018-06-01 | Stop reason: HOSPADM

## 2018-05-30 RX ORDER — MORPHINE SULFATE 10 MG/ML
8 INJECTION, SOLUTION INTRAMUSCULAR; INTRAVENOUS
Status: DISCONTINUED | OUTPATIENT
Start: 2018-05-30 | End: 2018-06-01

## 2018-05-30 RX ORDER — ONDANSETRON 2 MG/ML
4 INJECTION INTRAMUSCULAR; INTRAVENOUS
Status: DISCONTINUED | OUTPATIENT
Start: 2018-05-30 | End: 2018-06-01 | Stop reason: HOSPADM

## 2018-05-30 RX ADMIN — MORPHINE SULFATE 8 MG: 10 INJECTION INTRAVENOUS at 19:04

## 2018-05-30 RX ADMIN — ONDANSETRON 4 MG: 2 INJECTION INTRAMUSCULAR; INTRAVENOUS at 22:57

## 2018-05-30 RX ADMIN — SODIUM CHLORIDE, SODIUM LACTATE, POTASSIUM CHLORIDE, AND CALCIUM CHLORIDE 150 ML/HR: 600; 310; 30; 20 INJECTION, SOLUTION INTRAVENOUS at 18:44

## 2018-05-30 RX ADMIN — ONDANSETRON 4 MG: 2 INJECTION INTRAMUSCULAR; INTRAVENOUS at 19:04

## 2018-05-30 RX ADMIN — MORPHINE SULFATE 8 MG: 10 INJECTION INTRAVENOUS at 22:39

## 2018-05-30 RX ADMIN — SODIUM CHLORIDE, SODIUM LACTATE, POTASSIUM CHLORIDE, AND CALCIUM CHLORIDE 1000 ML: 600; 310; 30; 20 INJECTION, SOLUTION INTRAVENOUS at 18:38

## 2018-05-30 RX ADMIN — DIPHENHYDRAMINE HYDROCHLORIDE 25 MG: 50 INJECTION, SOLUTION INTRAMUSCULAR; INTRAVENOUS at 23:41

## 2018-05-30 NOTE — PROCEDURES
2 weeks post gastric bypass (from [facility]) with recent issues with PO intake and abd pain with bloating. Pouch looks normal but with dilated small bowel. Pt will be admitted to the hospital for IVFs and KUB later this afternoon to evaluate possible early SBO. See dictation.

## 2018-05-30 NOTE — PATIENT INSTRUCTIONS
Patient Instructions      1. Remember hydration goals - minimum of 64 ounces of liquids per day (dehydration is the number one reason for hospital readmission). 2. Continue to monitor carbohydrate and protein intake you need a minimum of  Grams of protein daily- remember to keep your total carbohydrates to 50 grams or less per day for best results. 3. Continue to work towards exercise goals - 60-90 minutes, 5 times a week minimum of deliberate, aerobic exercise is the ultimate goal with strength training 2 times each week. Refer to [place] for  information. 4. Remember to take vitamins as directed in your handbook. 5. Attend support group the 2nd Thursday of each month. 6. Constipation: Milk of Magnesia is for immediate relief. Miralax is to be used every day if constipation is a chronic problem. 7. Diarrhea: patients will occasionally develop lactose intolerance after surgery. Check to see if your protein shake has whey in it. If it does try one that does not have whey and stop all yogurts, cheeses and milks to see if the diarrhea goes away. 8. Call us at [phone] or email us through [facility]" with questions,     concerns or worsening of condition, we have someone on call 24 hours a day. If you are unable to reach our office, you are to go to your Primary Care Physician or the Emergency Department. Supplement Resource Guide    Importance of Protein:   Maintains lean body mass, produces antibodies to fight off infections, heals wounds, minimizes hair loss, helps to give you energy, helps with satiety, and keeping you full between meals. Importance of Calcium:  Needed for healthy bones and teeth, normal blood clotting, and nervous system functioning, higher risk of osteoporosis and bone disease with non-compliance. Importance of Multivitamins: Many functions.   Supply you with extra nutrients that you may be missing from food. May lead to iron deficiency anemia, weakness, fatigue, and many other symptoms with non-compliance. Importance of B Vitamins:  Important for red blood cell formation, metabolism, energy, and helps to maintain a healthy nervous system. Protein Supplement  Find one you like now. Use immediately after surgery. Look for:  35-50g protein each day from your protein supplement once you reach the progression diet. 0-3 g fat per serving  0-3 g sugar per serving    Protein drinks should be split in separate dosages. Recommend: Lifelong  1 year + Calcium Supplement:     Start taking within a month after surgery. Look for: Calcium Citrate Plus D (1500 mg per day)  Recommend: Citracal     .            Avoid chocolate chewable calcium. Can use chewable bariatric or [employer] brand or similar chewable. The body cannot absorb more than 500-600 mg of calcium at a time. Take for Life Multi-vitamin Supplement:      Start immediately after surgery: any complete chewable, such as: [place]s Complete chewables. Avoid [place] sours or gummies. They lack iron and other important nutrients and also have added sugar. Continue with chewable vitamin or change to adult complete multivitamin one month after surgery. Menstruating women can take a prenatal vitamin. Make sure has at least 18 mg iron and 074-320 mcg folic acid   Vitamin K47, B Complex Vitamin, and Biotin  Start taking within a month after surgery. Vitamin B12:  1000 mcg of Vitamin B12 three times weekly    Must take sublingually (meaning you take it under your tongue) or in a liquid drop form for easy absorption. B Complex Vitamin: Take a pill or liquid drop form once daily. Biotin: This vitamin can help prevent hair loss.     Recommend 5mg   (5000 mcg) a day  Biotin is Optional

## 2018-05-30 NOTE — PROGRESS NOTES
1610 - Pt arrived to unit. Direct admit from Dr. Hailey Card. Last episode emesis 0700hrs this morning; initial sx of n/v/abd pain slowly increased over past week. Summary - Pt comfortable throughout shift. Was seen by Dr. Hailey Card. Fluid bolus started and morphine/zofran administered on request; pt noted to be asleep approx 20 min after administration. Supportive mother at bedside. Patient Vitals for the past 12 hrs:   Temp Pulse Resp BP SpO2   05/30/18 1941 99.1 °F (37.3 °C) 86 18 103/61 98 %   05/30/18 1614 98.6 °F (37 °C) 91 17 123/83 98 %     1930 - Bedside shift change report given to Gabbie Singh RN (oncoming nurse) by Choco Christina RN (offgoing nurse). Report included the following information SBAR, Kardex, Intake/Output, MAR and Recent Results.

## 2018-05-30 NOTE — IP AVS SNAPSHOT
Summary of Care Report The Summary of Care report has been created to help improve care coordination. Users with access to [employer] or [address] (Web-based application) may access additional patient information including the Discharge Summary. If you are not currently a [address] user and need more information, please call the number listed below in the [place] [address] section and ask to be connected with Medical Records. Facility Information Name Address Phone [address] [address] [phone] Patient Information Patient Name Sex JR Brown ([id number]) Female 1990 Discharge Information Admitting Provider Service Area Unit Sarah Tubbs MD /  [facility] / [phone] Discharge Provider Discharge Date/Time Discharge Disposition Destination (none) (none) (none) (none) Patient Language Language ENGLISH [13] Hospital Problems as of 2018  Reviewed: 2018  1:32 PM by LEILA Hammonds None Non-Hospital Problems as of 2018  Reviewed: 2018  1:32 PM by LEILA Hammonds Class Noted - Resolved Last Modified Active Problems   Morbid obesity due to excess calories ([address]  2016 - Present 2016 by Asael Inman MD  
  Entered by Asael Inman MD  
  H/O gastric bypass  2016 - Present 2016 by Asael Inman MD  
  Entered by Asael Inman MD  
  Iron deficiency anemia secondary to inadequate dietary iron intake  2016 - Present 2016 by Marylee Harms, MD  
  Entered by Marylee Harms, MD  
  B12 deficiency  2016 - Present 2016 by Marylee Harms, MD  
  Entered by Marylee Harms, MD  
  Morbid obesity with BMI of 40.0-44.9, adult [facility])  Unknown - Present 5/15/2018 by Juhi Ríos MD  
  Entered by Juhi Ríos MD  
  Morbid obesity [facility])  Unknown - Present 2/28/2018 by Juhi Ríos MD  
  Entered by Juhi Ríos MD  
  Depression  Unknown - Present 2/28/2018 by Juhi Ríos MD  
  Entered by Juhi Ríos MD  
  Borderline diabetes  Unknown - Present 2/28/2018 by Juhi Ríos MD  
  Entered by Juhi Ríos MD  
  Intestinal malabsorption  Unknown - Present 2/28/2018 by Juhi Ríos MD  
  Entered by Juhi Ríos MD  
  Psychotic disorder  Unknown - Present 4/11/2018 by Juhi Ríos MD  
  Entered by Juhi Ríos MD  
  Dilation of stomach  Unknown - Present 4/11/2018 by Juhi Ríos MD  
  Entered by Juhi Ríos MD  
  
You are allergic to the following No active allergies Current Discharge Medication List  
  
ASK your doctor about these medications Dose & Instructions Dispensing Information Comments  
 clonazePAM 0.5 mg tablet Commonly known as:  KlonoPIN  
 TAKE 2 TABLET BY MOUTH EVERY DAY AS NEEDED PANIC/ANXIETY Refills:  0  
   
 L-Norgest&E Estradiol-E Estrad 0.15 mg-30 mcg (84)/10 mcg (7) 3mpk TAKE 1 TABLET BY MOUTH DAILY Refills:  2  
   
 omeprazole 20 mg capsule Commonly known as:  PRILOSEC Dose:  20 mg Take 1 Cap by mouth daily for 30 days. Quantity:  30 Cap Refills:  2 ONE-A-DAY WOMENS FORMULA PO Take  by mouth. Refills:  0  
   
 oxyCODONE-acetaminophen 5-325 mg per tablet Commonly known as:  PERCOCET Dose:  1 Tab Take 1 Tab by mouth every four (4) hours as needed for Pain. Max Daily Amount: 6 Tabs. Quantity:  30 Tab Refills:  0 PROzac 40 mg capsule Generic drug:  FLUoxetine Dose:  60 mg Take 60 mg by mouth nightly. Refills:  0 Surgery Information ID Date/Time Status Primary Surgeon All Procedures Location [id number] 5/30/2018 Unposted Gordon Payne MD upper GI study [facility] ENDOSCOPY Follow-up Information None Discharge Instructions None Chart Review Routing History No Routing History on File

## 2018-05-30 NOTE — PROGRESS NOTES
Reason for Readmission:    Concerns with po intake and abdominal pain with bloating         RRAT Score and Risk Level:     Low     Level of Readmission:    1      Care Conference scheduled:   TBD       Resources/supports as identified by patient/family:   Spouse; mother       Top Challenges facing patient (as identified by patient/family and CM): Finances/Medication cost?     N/A  Transportation      N/A  Support system or lack thereof? N/A  Living arrangements? N/A      Self-care/ADLs/Cognition? N/A       Current Advanced Directive/Advance Care Plan:  Not on file           Plan for utilizing home health:   TBD             Likelihood of additional readmission:   Low             Transition of Care Plan:    Based on readmission, the patient's previous Plan of Care   has been evaluated and/or modified. The current Transition of Care Plan is:       TBD      Pt is 2 weeks post gastric bypass (from [facility]) with recent issues with PO intake and abd pain with bloating. Pt is being admitted for IVF and KUB later this afternoon to assist with determining possible SBO. Please encourage ambulation to assist with determining an appropriate plan of care. CM to continue to follow. Care Management Interventions  Mode of Transport at Discharge:  Other (see comment) (Family)  Transition of Care Consult (CM Consult): Discharge Planning  Health Maintenance Reviewed: Yes  Current Support Network: Lives with Spouse, Family Lives Nearby  Confirm Follow Up Transport: Self  Discharge Location  Discharge Placement: Home with family assistance

## 2018-05-30 NOTE — IP AVS SNAPSHOT
[facility] 
 
 
 [address] [facility] [address] 
[phone] Patient: Nithya Rodríguez MRN: [id number] :1990 About your hospitalization You were admitted on:  May 30, 2018 You last received care in the:  [address] [place] [address] You were discharged on:  2018 Why you were hospitalized Your primary diagnosis was:  Not on File Your diagnoses also included:  Sbo (Small Bowel Obstruction) (Hcc), Abdominal Pain Follow-up Information Follow up With Details Comments Contact Info Beth Nails MD On 2018 Follow up appointment scheduled for 2018 at 10:30 a.m. [facility] [address] [place] [address] 
[phone] Phys Jenna, MD   Patient can only remember the practice name and not the physician Your Scheduled Appointments 2018 10:30 AM EDT Office Visit with [facility] ([address] [place] [address])  
 [facility] [address] [place] [address]  
[phone] 2018 11:00 AM EDT Office Visit with TSS NUTRI VISIT PEN Essie [facility] ([address] [place] [address])  
 [facility] [address] [place] [address]  
[phone] Discharge Orders None A check jeimy indicates which time of day the medication should be taken. My Medications CONTINUE taking these medications Instructions Each Dose to Equal  
 Morning Noon Evening Bedtime  
 clonazePAM 0.5 mg tablet Commonly known as:  Peace [place] Your last dose was: Your next dose is: TAKE 2 TABLET BY MOUTH EVERY DAY AS NEEDED PANIC/ANXIETY  
     
   
   
   
  
 L-Norgest&E Estradiol-E Estrad 0.15 mg-30 mcg (84)/10 mcg (7) 3mpk Your last dose was: Your next dose is: TAKE 1 TABLET BY MOUTH DAILY omeprazole 20 mg capsule Commonly known as:  PRILOSEC Your last dose was: Your next dose is: Take 1 Cap by mouth daily for 30 days. 20 mg  
    
   
   
   
  
 ONE-A-DAY WOMENS FORMULA PO Your last dose was: Your next dose is: Take  by mouth. PROzac 40 mg capsule Generic drug:  FLUoxetine Your last dose was: Your next dose is: Take 60 mg by mouth nightly. 60 mg Discharge Instructions Abdominal Pain: Care Instructions Your Care Instructions Abdominal pain has many possible causes. Some aren't serious and get better on their own in a few days. Others need more testing and treatment. If your pain continues or gets worse, you need to be rechecked and may need more tests to find out what is wrong. You may need surgery to correct the problem. Don't ignore new symptoms, such as fever, nausea and vomiting, urination problems, pain that gets worse, and dizziness. These may be signs of a more serious problem. Your doctor may have recommended a follow-up visit in the next 8 to 12 hours. If you are not getting better, you may need more tests or treatment. The doctor has checked you carefully, but problems can develop later. If you notice any problems or new symptoms, get medical treatment right away. Follow-up care is a key part of your treatment and safety. Be sure to make and go to all appointments, and call your doctor if you are having problems. It's also a good idea to know your test results and keep a list of the medicines you take. How can you care for yourself at home? · Rest until you feel better. · To prevent dehydration, drink plenty of fluids, enough so that your urine is light yellow or clear like water. Choose water and other caffeine-free clear liquids until you feel better.  If you have kidney, heart, or liver disease and have to limit fluids, talk with your doctor before you increase the amount of fluids you drink. · If your stomach is upset, eat mild foods, such as rice, dry toast or crackers, bananas, and applesauce. Try eating several small meals instead of two or three large ones. · Wait until 48 hours after all symptoms have gone away before you have spicy foods, alcohol, and drinks that contain caffeine. · Do not eat foods that are high in fat. · Avoid anti-inflammatory medicines such as aspirin, ibuprofen (Advil, Motrin), and naproxen (Aleve). These can cause stomach upset. Talk to your doctor if you take daily aspirin for another health problem. When should you call for help? Call 911 anytime you think you may need emergency care. For example, call if: 
? · You passed out (lost consciousness). ? · You pass maroon or very bloody stools. ? · You vomit blood or what looks like coffee grounds. ? · You have new, severe belly pain. ?Call your doctor now or seek immediate medical care if: 
? · Your pain gets worse, especially if it becomes focused in one area of your belly. ? · You have a new or higher fever. ? · Your stools are black and look like tar, or they have streaks of blood. ? · You have unexpected vaginal bleeding. ? · You have symptoms of a urinary tract infection. These may include: 
¨ Pain when you urinate. ¨ Urinating more often than usual. 
¨ Blood in your urine. ? · You are dizzy or lightheaded, or you feel like you may faint. ? Watch closely for changes in your health, and be sure to contact your doctor if: 
? · You are not getting better after 1 day (24 hours). Where can you learn more? Go to http://hernando-caitlyn.info/. Enter L026 in the search box to learn more about \"Abdominal Pain: Care Instructions. \" Current as of: March 20, 2017 Content Version: 11.4 © 5689-2386 Healthwise, Incorporated.  Care instructions adapted under license by Laly Magdaleno (which disclaims liability or warranty for this information). If you have questions about a medical condition or this instruction, always ask your healthcare professional. [address] any warranty or liability for your use of this information. Introducing [facility]! Dear Russel Sarmiento: Thank you for requesting a [employer] account. Our records indicate that you already have an active [employer] account. You can access your account anytime at [email or web address] Did you know that you can access your hospital and ER discharge instructions at any time in [employer]? You can also review all of your test results from your hospital stay or ER visit. Additional Information If you have questions, please visit the Frequently Asked Questions section of the [employer] website at [email or web address] Remember, [employer] is NOT to be used for urgent needs. For medical emergencies, dial 911. Now available from your iPhone and Android! Introducing Sina Block As a [facility] patient, I wanted to make you aware of our electronic visit tool called Sina Chandlerfin. [facility] 24/7 allows you to connect within minutes with a medical provider 24 hours a day, seven days a week via a mobile device or tablet or logging into a secure website from your computer. You can access Sina Block from anywhere in the [place]. A virtual visit might be right for you when you have a simple condition and feel like you just dont want to get out of bed, or cant get away from work for an appointment, when your regular [facility] provider is not available (evenings, weekends or holidays), or when youre out of town and need minor care.   Electronic visits cost only $49 and if the Sina Block provider determines a prescription is needed to treat your condition, one can be electronically transmitted to a nearby pharmacy*. Please take a moment to enroll today if you have not already done so. The enrollment process is free and takes just a few minutes. To enroll, please download the [employer] 24/7 sherice to your tablet or phone, or visit [email or web address] to enroll on your computer. And, as an [address] [place] [address] patient with a [employer] account, the results of your visits will be scanned into your electronic medical record and your primary care provider will be able to view the scanned results. We urge you to continue to see your regular [employer] provider for your ongoing medical care. And while your primary care provider may not be the one available when you seek a [employer] virtual visit, the peace of mind you get from getting a real diagnosis real time can be priceless. For more information on [employer], view our Frequently Asked Questions (FAQs) at [email or web address]. Sincerely, 
 
Gume Nails MD 
Chief Medical Officer [facility] Karyn Hodge *:  certain medications cannot be prescribed via [employer] Providers Seen During Your Hospitalization Provider Specialty Primary office phone Maranda Waldron MD General Surgery [phone] Your Primary Care Physician (PCP) Primary Care Physician Office Phone Office Fax OTHER, PHYS ** None ** ** None ** You are allergic to the following No active allergies Recent Documentation Weight Breastfeeding? BMI OB Status Smoking Status 120 kg No 42.71 kg/m2 Medically Induced Never Smoker Emergency Contacts Name Discharge Info Relation Home Work Mobile [address] CAREGIVER [3] Spouse [3]   [phone] Mana Sewell DISCHARGE CAREGIVER [3] Mother [14]   [phone] Yann Lujan  Parent [1] [phone] Patient Belongings The following personal items are in your possession at time of discharge: 
  Dental Appliances: None  Visual Aid: None      Home Medications: None   Jewelry: Ring  Clothing: Dress, Other (comment) (shoes)    Other Valuables: Cell Phone Please provide this summary of care documentation to your next provider. Signatures-by signing, you are acknowledging that this After Visit Summary has been reviewed with you and you have received a copy. Patient Signature:  ____________________________________________________________ Date:  ____________________________________________________________  
  
Arna Katrina Provider Signature:  ____________________________________________________________ Date:  ____________________________________________________________

## 2018-05-30 NOTE — PROGRESS NOTES
Subjective:      Tommy Guzman is a 33 y.o. female is now 2 weeks status post revision of LGBP with HUNTER >120 min. Doing well overall. She has lost a total of 21 pounds since surgery. There is no height or weight on file to calculate BMI. Currently on a liquid diet without difficulty. Taking in 70-80oz water daily. Sources of protein include protein shakes. Has been doing a lot of walking but not deliberately for exercise yet. Bowel movements are constipated over the course of the past few days, she took an OTC laxative with relief. The patient is compliant with multivitamins. Patient states that everything was going well, then three days ago, she developed generalized abdominal pain and bloating. She felt like she had gas, and has tried walking around and has taken Gas X without relief. Yesterday she developed intractable n/v/d that lasted until 7AM today. Since then, she has been able to drink, but has been belching all day. She took one sip of water while in the exam room with me and she belched about 5 times afterwards. Surgery related complications: none. Liver bx report reviewed with patient. Stomach bx report reviewed with patient.     Weight Loss Metrics 5/15/2018 5/9/2018 5/7/2018 4/11/2018 3/23/2018 3/16/2018 3/7/2018   Today's Wt 280 lb 4.8 oz 276 lb 276 lb 14.4 oz 278 lb 275 lb 8 oz 270 lb 276 lb   BMI 45.24 kg/m2 44.55 kg/m2 44.69 kg/m2 44.87 kg/m2 44.47 kg/m2 43.58 kg/m2 44.55 kg/m2          Comorbidities:    Hypertension: not applicable  Diabetes: not applicable  Obstructive Sleep Apnea: not applicable  Hyperlipidemia: not applicable  Stress Urinary Incontinence: not applicable  Gastroesophageal Reflux: not applicable  Weight related arthropathy:not applicable     Patient Active Problem List   Diagnosis Code    Morbid obesity due to excess calories ([facility] [address] E66.01    H/O gastric bypass Z98.84    Iron deficiency anemia secondary to inadequate dietary iron intake D50.8    B12 deficiency E53.8    Morbid obesity with BMI of 40.0-44.9, adult ([facility]) E66.01, Z68.41    Morbid obesity ([facility]) E66.01    Depression F32.9    Borderline diabetes R73.03    Intestinal malabsorption K90.9    Psychotic disorder F29    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        Past Medical History:   Diagnosis Date    Depression     anxiety    Dilation of stomach     Intestinal malabsorption     Morbid obesity (HCC)     Morbid obesity with BMI of 40.0-44.9, adult ([facility])        Past Surgical History:   Procedure Laterality Date    HX GI      gastric bypass-2007    HX OTHER SURGICAL      egd       Current Outpatient Prescriptions   Medication Sig Dispense Refill    omeprazole (PRILOSEC) 20 mg capsule Take 1 Cap by mouth daily for 30 days. 30 Cap 2    oxyCODONE-acetaminophen (PERCOCET) 5-325 mg per tablet Take 1 Tab by mouth every four (4) hours as needed for Pain. Max Daily Amount: 6 Tabs. 30 Tab 0    FLUoxetine (PROZAC) 40 mg capsule Take 60 mg by mouth nightly.  clonazePAM (KLONOPIN) 0.5 mg tablet TAKE 2 TABLET BY MOUTH EVERY DAY AS NEEDED PANIC/ANXIETY  0    L-Norgest&E Estradiol-E Estrad 0.15 mg-30 mcg (84)/10 mcg (7) 3MPk TAKE 1 TABLET BY MOUTH DAILY  2    MULTIVITS,CA,MINERALS/IRON/FA (ONE-A-DAY WOMENS FORMULA PO) Take  by mouth. No Known Allergies      Objective: There were no vitals taken for this visit. General:  alert, cooperative, no distress, appears stated age   Chest: lungs clear to auscultation, no rhonchi, rales or wheezes, no accessory muscle use   Cor:   Regular rate and rhythm or without murmur or extra heart sounds   Abdomen: Generalized ttp, mildly distended    Incisions:   healing well, no drainage, no erythema, no hernia, no seroma, no swelling, no dehiscence, incision well approximated       Assessment:   History of Morbid obesity, status post revision of LGBP with HUNTER >120 min. Abdominal pain with associated n/v/d - UGI  Plan:     1.  Increase activity to the goal of 30 minutes daily   2. Review pathology report with patient at 1 month visit  3. Advance diet to soft solid phase. Reminded to measure portions, continue high protein, low carbohydrate diet. Reminded to eat regularly, to eat slowly & not to drink with meals. Refer to the handbook given in class. 4. Continue multivitamin   5. Continue current medications and follow up with PCP for management of regimen. 6. Encouraged to attend support group   7. I have discussed this plan with patient and they verbalized understanding  8. Follow up in 2 weeks or sooner if patient has questions, concerns or worsening of condition, if unable to reach our office, patient should report to the ED. 5. Ms. Maggie Hill has a reminder for a \"due or due soon\" health maintenance. I have asked that she contact her primary care provider for a follow-up on this health maintenance.

## 2018-05-30 NOTE — H&P
Admission - History and Physical    Subjective: The patient is a 33 y.o. obese female who is 2 weeks post uncomplicated gastric bypass revision (original gastric bypass was performed at [facility] in 2007). She was doing well initially post-op and progressing without issue. Approximately 36 hours prior to this admission she noticed acute onset of abdominal pain with a sensation of bloating. She was in our office earlier today and sent to the UGI clinic for radiologic evaluation. She was given gastrografin to establish there was not leak. Once this was confirmed she was given barium with passed through a normal gastric pouch and anastomosis. She did then have some dilation of her proximal alethea limb and small intestine. Given these findings and overall clinical picture she was admitted for further radiologic studies and lab work.     Bariatric comorbidities continue to include:   Patient Active Problem List   Diagnosis Code    Morbid obesity due to excess calories ([address] E66.01    H/O gastric bypass Z98.84    Iron deficiency anemia secondary to inadequate dietary iron intake D50.8    B12 deficiency E53.8    Morbid obesity with BMI of 40.0-44.9, adult ([facility]) E66.01, Z68.41    Morbid obesity ([facility]) E66.01    Depression F32.9    Borderline diabetes R73.03    Intestinal malabsorption K90.9    Psychotic disorder F29    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      Patient Active Problem List    Diagnosis Date Noted    Psychotic disorder     Dilation of stomach     Morbid obesity with BMI of 40.0-44.9, adult ([address]     Morbid obesity ([address]     Depression     Borderline diabetes     Intestinal malabsorption     Iron deficiency anemia secondary to inadequate dietary iron intake 12/21/2016    B12 deficiency 12/21/2016    Morbid obesity due to excess calories ([address] 07/30/2016    H/O gastric bypass 07/30/2016      Past Surgical History:   Procedure Laterality Date    HX GI      gastric bypass-2007    HX OTHER SURGICAL egd      Social History   Substance Use Topics    Smoking status: Never Smoker    Smokeless tobacco: Never Used    Alcohol use No      Family History   Problem Relation Age of Onset    No Known Problems Mother     No Known Problems Father     No Known Problems Sister     No Known Problems Brother     No Known Problems Maternal Aunt     No Known Problems Maternal Uncle     No Known Problems Paternal Aunt     No Known Problems Paternal Uncle     No Known Problems Maternal Grandmother     No Known Problems Maternal Grandfather     No Known Problems Paternal Grandmother     No Known Problems Paternal Grandfather     No Known Problems Other         No Known Allergies       Review of Systems:        General - No history or complaints of unexpected fever, chills, or weight loss  Head/Neck - No history or complaints of headache, diplopia, dysphagia, hearing loss  Cardiac - No history or complaints of chest pain, palpitations, murmur, or shortness of breath  Pulmonary - No history or complaints of shortness of breath, productive cough, hemoptysis  Gastrointestinal - see above  Genitourinary - No history or complaints of hematuria/dysuria, stress urinary incontinence symptoms, or renal lithiasis  Musculoskeletal - no joint pain,  no muscular weakness  Hematologic - No history or complaints of bleeding disorders,  No blood transfusions  Neurologic - No history or complaints of  migraine headaches, seizure activity, syncopal episodes, TIA or stroke  Integumentary - No history or complaints of rashes, abnormal nevi, skin cancer  Gynecological - unremarkable    Objective:     Visit Vitals    /83 (BP 1 Location: Right arm, BP Patient Position: Sitting)    Pulse 91    Temp 98.6 °F (37 °C)    Resp 17    SpO2 98%       Physical Examination: General appearance - alert, well appearing, and in no distress  Mental status - alert, oriented to person, place, and time  Eyes - pupils equal and reactive, extraocular eye movements intact  Ears - bilateral TM's and external ear canals normal  Nose - normal and patent, no erythema, discharge or polyps  Mouth - mucous membranes moist, pharynx normal without lesions  Neck - supple, no significant adenopathy  Lymphatics - no palpable lymphadenopathy, no hepatosplenomegaly  Chest - clear to auscultation, no wheezes, rales or rhonchi, symmetric air entry  Heart - normal rate, regular rhythm, normal S1, S2, no murmurs, rubs, clicks or gallops  Abdomen - soft, nontender, nondistended, hypoactive bowel sounds,  no masses or organomegaly  Back exam - full range of motion, no tenderness, palpable spasm or pain on motion  Neurological - alert, oriented, normal speech, no focal findings or movement disorder noted  Musculoskeletal - no joint tenderness, deformity or swelling  Extremities - peripheral pulses normal, no pedal edema, no clubbing or cyanosis  Skin - normal coloration and turgor, no rashes, no suspicious skin lesions noted    Labs :     Lab Results   Component Value Date/Time    WBC 8.2 05/07/2018 12:29 PM    HGB 13.4 05/07/2018 12:29 PM    HCT 41.3 05/07/2018 12:29 PM    PLATELET 363 34/59/7502 12:29 PM    MCV 84.1 05/07/2018 12:29 PM     Lab Results   Component Value Date/Time    Sodium 140 05/07/2018 12:29 PM    Potassium 4.1 05/07/2018 12:29 PM    Chloride 105 05/07/2018 12:29 PM    CO2 23 05/07/2018 12:29 PM    Anion gap 12 05/07/2018 12:29 PM    Glucose 92 05/07/2018 12:29 PM    BUN 12 05/07/2018 12:29 PM    Creatinine 0.78 05/07/2018 12:29 PM    BUN/Creatinine ratio 15 05/07/2018 12:29 PM    GFR est AA >60 05/07/2018 12:29 PM    GFR est non-AA >60 05/07/2018 12:29 PM    Calcium 8.6 05/07/2018 12:29 PM    Bilirubin, total 0.3 05/07/2018 12:29 PM    AST (SGOT) 14 (L) 05/07/2018 12:29 PM    Alk.  phosphatase 86 05/07/2018 12:29 PM    Protein, total 7.8 05/07/2018 12:29 PM    Albumin 3.5 05/07/2018 12:29 PM    Globulin 4.3 (H) 05/07/2018 12:29 PM    A-G Ratio 0.8 05/07/2018 12:29 PM    ALT (SGPT) 21 05/07/2018 12:29 PM     Lab Results   Component Value Date/Time    Iron 84 02/28/2018 12:55 PM    TIBC 718 (H) 12/21/2016 01:00 PM    Iron % saturation 3 12/21/2016 01:00 PM    Ferritin 9 02/28/2018 12:55 PM     Lab Results   Component Value Date/Time    Folate 16.1 02/28/2018 12:55 PM     Lab Results   Component Value Date/Time    Vitamin D 25-Hydroxy 16.5 (L) 02/28/2018 12:55 PM           UGI Results:     From earlier today with distention of small bowel    Assessment:     2 weeks post revision of prior gastric bypass (via [facility] in 2007) with acute onset of abdominal pain and bloating sensation with suspicious UGI earlier today in clinic. Cause of patients symptoms and UGI findings likely not related to small bowel obstruction at the 2 week period.     Plan:     Admit to hospital for further monitoring and IVFs (to include 1 liter bolus of LR)    KUB later today and in the AM to evaluate passage of contrast material through distal gastric bypass anatomy    Check stat labs today (BMP, CBC, Mag, Lipase)    NPO status    She understands the need for repeat KUBs and strict bowel rest.    Signed By: Charlie Alberto MD     May 30, 2018

## 2018-05-31 ENCOUNTER — APPOINTMENT (OUTPATIENT)
Dept: GENERAL RADIOLOGY | Age: 28
DRG: 392 | End: 2018-05-31
Attending: PHYSICIAN ASSISTANT
Payer: COMMERCIAL

## 2018-05-31 PROBLEM — K56.609 SBO (SMALL BOWEL OBSTRUCTION) (HCC): Status: ACTIVE | Noted: 2018-05-31

## 2018-05-31 PROBLEM — R10.9 ABDOMINAL PAIN: Status: ACTIVE | Noted: 2018-05-31

## 2018-05-31 PROCEDURE — 65270000029 HC RM PRIVATE

## 2018-05-31 PROCEDURE — 74019 RADEX ABDOMEN 2 VIEWS: CPT

## 2018-05-31 PROCEDURE — 74011250637 HC RX REV CODE- 250/637: Performed by: SPECIALIST

## 2018-05-31 PROCEDURE — 74011250636 HC RX REV CODE- 250/636: Performed by: SPECIALIST

## 2018-05-31 RX ORDER — DIPHENHYDRAMINE HYDROCHLORIDE 50 MG/ML
12.5 INJECTION, SOLUTION INTRAMUSCULAR; INTRAVENOUS
Status: DISCONTINUED | OUTPATIENT
Start: 2018-05-31 | End: 2018-06-01 | Stop reason: HOSPADM

## 2018-05-31 RX ORDER — SUCRALFATE 1 G/10ML
1 SUSPENSION ORAL
Status: DISCONTINUED | OUTPATIENT
Start: 2018-05-31 | End: 2018-06-01 | Stop reason: HOSPADM

## 2018-05-31 RX ADMIN — SUCRALFATE 1 G: 1 SUSPENSION ORAL at 22:51

## 2018-05-31 RX ADMIN — SUCRALFATE 1 G: 1 SUSPENSION ORAL at 15:36

## 2018-05-31 RX ADMIN — ONDANSETRON 4 MG: 2 INJECTION INTRAMUSCULAR; INTRAVENOUS at 15:36

## 2018-05-31 RX ADMIN — SODIUM CHLORIDE, SODIUM LACTATE, POTASSIUM CHLORIDE, AND CALCIUM CHLORIDE 1000 ML: 600; 310; 30; 20 INJECTION, SOLUTION INTRAVENOUS at 07:03

## 2018-05-31 RX ADMIN — MORPHINE SULFATE 8 MG: 10 INJECTION INTRAVENOUS at 15:36

## 2018-05-31 RX ADMIN — DIPHENHYDRAMINE HYDROCHLORIDE 12.5 MG: 50 INJECTION, SOLUTION INTRAMUSCULAR; INTRAVENOUS at 20:37

## 2018-05-31 RX ADMIN — MORPHINE SULFATE 8 MG: 10 INJECTION INTRAVENOUS at 19:23

## 2018-05-31 RX ADMIN — SODIUM CHLORIDE, SODIUM LACTATE, POTASSIUM CHLORIDE, AND CALCIUM CHLORIDE 150 ML/HR: 600; 310; 30; 20 INJECTION, SOLUTION INTRAVENOUS at 11:56

## 2018-05-31 RX ADMIN — ONDANSETRON 4 MG: 2 INJECTION INTRAMUSCULAR; INTRAVENOUS at 03:17

## 2018-05-31 RX ADMIN — ONDANSETRON 4 MG: 2 INJECTION INTRAMUSCULAR; INTRAVENOUS at 11:56

## 2018-05-31 RX ADMIN — MORPHINE SULFATE 8 MG: 10 INJECTION INTRAVENOUS at 03:21

## 2018-05-31 RX ADMIN — DIPHENHYDRAMINE HYDROCHLORIDE 12.5 MG: 50 INJECTION, SOLUTION INTRAMUSCULAR; INTRAVENOUS at 16:07

## 2018-05-31 RX ADMIN — DIPHENHYDRAMINE HYDROCHLORIDE 12.5 MG: 50 INJECTION, SOLUTION INTRAMUSCULAR; INTRAVENOUS at 12:10

## 2018-05-31 RX ADMIN — ONDANSETRON 4 MG: 2 INJECTION INTRAMUSCULAR; INTRAVENOUS at 07:26

## 2018-05-31 RX ADMIN — MORPHINE SULFATE 8 MG: 10 INJECTION INTRAVENOUS at 11:56

## 2018-05-31 RX ADMIN — ONDANSETRON 4 MG: 2 INJECTION INTRAMUSCULAR; INTRAVENOUS at 20:38

## 2018-05-31 RX ADMIN — MORPHINE SULFATE 8 MG: 10 INJECTION INTRAVENOUS at 22:51

## 2018-05-31 RX ADMIN — MORPHINE SULFATE 8 MG: 10 INJECTION INTRAVENOUS at 07:26

## 2018-05-31 NOTE — PROGRESS NOTES
Pt is up ad kiko to bathroom. Requested medication for sleep, MD called and received order for Benadryl IV HS.    2300 Zofran given for report of nausea after Morphine given - teaching done regarding medication frequency. 0400 Ambulated in hallway after Morphine given for pain.  Ice chips provided, NPO for now.     9965 IVF of LR bolus given as ordered by MD. Shift uneventful

## 2018-05-31 NOTE — ROUTINE PROCESS
Bedside and Verbal shift change report given to Jason Smith (oncoming nurse) by Jie Singh RN   (offgoing nurse). Report included the following information SBAR, Kardex, Intake/Output and MAR.

## 2018-05-31 NOTE — OP NOTES
[facility] [place]  OPERATIVE REPORT    Stanley Pineda  MR#: [id number]  : 1990  ACCOUNT #: [de-identified]   DATE OF SERVICE: 2018    PREOPERATIVE DIAGNOSES:  Patient is 2 weeks out from a gastric bypass revision performed laparoscopically with nausea and emesis. POSTOPERATIVE DIAGNOSES:  Patient is 2 weeks out from a gastric bypass revision performed laparoscopically with nausea and emesis. PROCEDURE PERFORMED:  Upper GI study with barium. ESTIMATED BLOOD LOSS:  None. SPECIMEN:  None. ANESTHESIA:  None. COMPLICATIONS:  None. IMPLANTS:  None. SPECIMENS REMOVED:  none    SURGEON:  Elizabeth Morse. Vladimir Paiz MD    ASSISTANTS:  None. STATEMENT OF MEDICAL NECESSITY:  The patient is a 52-year-old female who had a total gastric bypass revision performed laparoscopically about 2 weeks ago. She did fine for the first 12 days after surgery, but over the past 2 days has had some nausea and emesis and a pressure sensation in her mid abdomen. She came to the office for routine followup, told our nurse practitioner of her symptoms, and she has been sent down to x-ray at this time for upper GI study. PROCEDURE:  The patient was brought to the fluoro unit where she was given Omnipaque. Initially, the contrast filled the esophagus, which was normal.  The gastric pouch was normal, and the gastrojejunal anastomosis which was normal.  Contrast then flowed distally into the Maria Luz limb, which was normal.  We then followed up the Omnipaque with dilute barium. Contrast then filled the gastric pouch, which was normal, the Maria Luz limb, which was normal, but then we waited about 15-20 minutes past her initial oral intake of barium. Contrast hung up at a location about 2 feet distal to the gastrojejunal anastomosis. There was some mild dilation of her Maria Luz limb at this point. At this juncture with this aforementioned finding, we are going to admit her for observation.   We are going to obtain a delayed KUB on her sometime later this afternoon and 1 additional in the morning. In addition, we will check some electrolytes on her and see if this might be an electrolyte abnormality.       Jacinto Perez       AT / [place]  D: 05/30/2018 18:55     T: 05/30/2018 20:32  JOB #: [id number]

## 2018-05-31 NOTE — PROGRESS NOTES
Inpatient Daily Progress Note    Subjective:    Patient states she is feeling less dehydrated than yesterday. She has been up walking around the nurses station and states that she has been continuing to belch. Pt states her abdomen feels less distended than yesterday. Pain is well controlled. She has not had nausea, has not vomited, has passed flatus, and has not had a bowel movement. Objective:     Physical Exam:  Visit Vitals    /62    Pulse 79    Temp 97.8 °F (36.6 °C)    Resp 16    Wt 120 kg (264 lb 9.6 oz)    SpO2 100%    Breastfeeding No    BMI 42.71 kg/m2       Gen: Well developed, well nourished 33 y.o. female in no acute distress  Resp: clear to auscultation bilaterally, no wheezes   Cardio: Regular rate and rhythm, no murmurs, clicks, gallops or rubs, no edema  Abdomen: soft, non-tender, nondistended, with hypoactive bowel sounds, no hernias  Psych: alert and oriented to person, place and time      No results found for this or any previous visit (from the past 12 hour(s)). Assessment:     Seymour Dia is a 33 y.o. female s/p revision of Laparoscopic Gastric Bypass, admitted for dilated proximal bowel found on UGI with associated bloating, n/v/d.        Plan:     -Continue current medications  - Repeat KUB today  - Encourage ambulation  - Bariatric full liquid diet

## 2018-05-31 NOTE — PROGRESS NOTES
Problem: Falls - Risk of  Goal: *Absence of Falls  Document Jaswinder Fall Risk and appropriate interventions in the flowsheet.    Outcome: Progressing Towards Goal  Fall Risk Interventions:            Medication Interventions: Evaluate medications/consider consulting pharmacy, Teach patient to arise slowly

## 2018-05-31 NOTE — PROGRESS NOTES
Bedside and Verbal shift change report given to VERONICA Nicole RN (oncoming nurse) by Lizzy Bajwa RN (offgoing nurse). Report included the following information SBAR and Kardex.      Patient Vitals for the past 12 hrs:   Temp Pulse Resp BP SpO2   05/31/18 1514 97.7 °F (36.5 °C) 77 18 102/52 98 %   05/31/18 1203 98 °F (36.7 °C) 88 16 110/74 98 %   05/31/18 0823 97.8 °F (36.6 °C) 79 16 106/62 100 %

## 2018-05-31 NOTE — PROGRESS NOTES
Bariatric Surgery                PaD #1    Visit Vitals    /74 (BP 1 Location: Right arm, BP Patient Position: Sitting)    Pulse 88    Temp 98 °F (36.7 °C)    Resp 16    Wt 120 kg (264 lb 9.6 oz)    SpO2 98%    Breastfeeding No    BMI 42.71 kg/m2     Patient doing well, asking about D/C plan     Exam:  Appears well in no distress  Lungs- clear bilaterally  Abd - soft, incisions look good without erythema  Extremities- no new edema or swelling    KUB - with contrast in colon / no bowel obstruction    Data Review:    Labs: Results:       Chemistry Recent Labs      05/30/18   1717   GLU  86   NA  140   K  4.0   CL  106   CO2  22   BUN  17   CREA  0.82   CA  8.3*   AGAP  12   BUCR  21*      CBC w/Diff Recent Labs      05/30/18   1717   WBC  10.5   RBC  5.04   HGB  13.8   HCT  42.0   PLT  328      Coagulation No results for input(s): PTP, INR, APTT in the last 72 hours. No lab exists for component: INREXT    Liver Enzymes No results for input(s): TP, ALB, TBIL, AP, SGOT, GPT in the last 72 hours. No lab exists for component: DBIL       Assessment/Plan: S/P  revision of prior gastric bypass X 2 weeks with acute onset N/V and suspicious UGI yesterday  - doing well without any issues    1. KUB findings reviewed with patient  2. Tolerating limited PO as of now - will encourage to consume more to establish functionality of GI system  3.  Possible D/C home later today if tolerate more aggressive PO  with routine follow-up

## 2018-05-31 NOTE — ROUTINE PROCESS
Bedside and Verbal shift change report given to STEVEN Combs RN (oncoming nurse) by Fernando Bourne RN   (offgoing nurse). Report included the following information SBAR, Kardex, Intake/Output, MAR and Recent Results.

## 2018-06-01 ENCOUNTER — APPOINTMENT (OUTPATIENT)
Dept: GENERAL RADIOLOGY | Age: 28
DRG: 392 | End: 2018-06-01
Attending: PHYSICIAN ASSISTANT
Payer: COMMERCIAL

## 2018-06-01 ENCOUNTER — APPOINTMENT (OUTPATIENT)
Dept: GENERAL RADIOLOGY | Age: 28
DRG: 392 | End: 2018-06-01
Attending: SPECIALIST
Payer: COMMERCIAL

## 2018-06-01 ENCOUNTER — TELEPHONE (OUTPATIENT)
Dept: SURGERY | Age: 28
End: 2018-06-01

## 2018-06-01 VITALS
DIASTOLIC BLOOD PRESSURE: 66 MMHG | HEART RATE: 84 BPM | TEMPERATURE: 98.9 F | SYSTOLIC BLOOD PRESSURE: 112 MMHG | WEIGHT: 264.6 LBS | BODY MASS INDEX: 42.71 KG/M2 | OXYGEN SATURATION: 97 % | RESPIRATION RATE: 16 BRPM

## 2018-06-01 PROCEDURE — 74250 X-RAY XM SM INT 1CNTRST STD: CPT

## 2018-06-01 PROCEDURE — 74011250636 HC RX REV CODE- 250/636: Performed by: SPECIALIST

## 2018-06-01 PROCEDURE — 74011250637 HC RX REV CODE- 250/637: Performed by: SPECIALIST

## 2018-06-01 PROCEDURE — 74018 RADEX ABDOMEN 1 VIEW: CPT

## 2018-06-01 RX ORDER — MORPHINE SULFATE 10 MG/ML
5 INJECTION, SOLUTION INTRAMUSCULAR; INTRAVENOUS
Status: DISCONTINUED | OUTPATIENT
Start: 2018-06-01 | End: 2018-06-01 | Stop reason: HOSPADM

## 2018-06-01 RX ORDER — ONDANSETRON 4 MG/1
4 TABLET, ORALLY DISINTEGRATING ORAL
Qty: 30 TAB | Refills: 1 | Status: SHIPPED | OUTPATIENT
Start: 2018-06-01 | End: 2019-05-09 | Stop reason: ALTCHOICE

## 2018-06-01 RX ADMIN — DIPHENHYDRAMINE HYDROCHLORIDE 12.5 MG: 50 INJECTION, SOLUTION INTRAMUSCULAR; INTRAVENOUS at 14:11

## 2018-06-01 RX ADMIN — MORPHINE SULFATE 5 MG: 10 INJECTION INTRAVENOUS at 11:28

## 2018-06-01 RX ADMIN — ONDANSETRON 4 MG: 2 INJECTION INTRAMUSCULAR; INTRAVENOUS at 10:34

## 2018-06-01 RX ADMIN — SUCRALFATE 1 G: 1 SUSPENSION ORAL at 06:39

## 2018-06-01 RX ADMIN — DIPHENHYDRAMINE HYDROCHLORIDE 12.5 MG: 50 INJECTION, SOLUTION INTRAMUSCULAR; INTRAVENOUS at 06:39

## 2018-06-01 RX ADMIN — MORPHINE SULFATE 5 MG: 10 INJECTION INTRAVENOUS at 07:20

## 2018-06-01 RX ADMIN — ONDANSETRON 4 MG: 2 INJECTION INTRAMUSCULAR; INTRAVENOUS at 06:40

## 2018-06-01 RX ADMIN — MORPHINE SULFATE 5 MG: 10 INJECTION INTRAVENOUS at 02:26

## 2018-06-01 RX ADMIN — SODIUM CHLORIDE, SODIUM LACTATE, POTASSIUM CHLORIDE, AND CALCIUM CHLORIDE 150 ML/HR: 600; 310; 30; 20 INJECTION, SOLUTION INTRAVENOUS at 10:34

## 2018-06-01 RX ADMIN — DIPHENHYDRAMINE HYDROCHLORIDE 12.5 MG: 50 INJECTION, SOLUTION INTRAMUSCULAR; INTRAVENOUS at 10:34

## 2018-06-01 RX ADMIN — DIPHENHYDRAMINE HYDROCHLORIDE 12.5 MG: 50 INJECTION, SOLUTION INTRAMUSCULAR; INTRAVENOUS at 00:08

## 2018-06-01 RX ADMIN — ONDANSETRON 4 MG: 2 INJECTION INTRAMUSCULAR; INTRAVENOUS at 00:15

## 2018-06-01 NOTE — PROGRESS NOTES
Bariatric Surgery                PaD #2    Visit Vitals    /62 (BP 1 Location: Left arm, BP Patient Position: At rest)    Pulse 89    Temp 98.4 °F (36.9 °C)    Resp 18    Wt 120 kg (264 lb 9.6 oz)    SpO2 96%    Breastfeeding No    BMI 42.71 kg/m2     Patient has some nausea but no vomiting     Exam:  Appears well in no distress  Lungs- clear bilaterally  Abd - soft, incisions look good without erythema  Extremities- no new edema or swelling    KUB from today - improved gas bowel pattern when compared to yesterdays    Data Review:    Labs: Results:       Chemistry Recent Labs      05/30/18   1717   GLU  86   NA  140   K  4.0   CL  106   CO2  22   BUN  17   CREA  0.82   CA  8.3*   AGAP  12   BUCR  21*      CBC w/Diff Recent Labs      05/30/18   1717   WBC  10.5   RBC  5.04   HGB  13.8   HCT  42.0   PLT  328      Coagulation No results for input(s): PTP, INR, APTT in the last 72 hours. No lab exists for component: INREXT    Liver Enzymes No results for input(s): TP, ALB, TBIL, AP, SGOT, GPT in the last 72 hours. No lab exists for component: DBIL       Assessment/Plan: S/P  S/P gastric bypass revision with recent onset of N/V and abnormal UGI - doing better. Some nausea but no vomiting despite significant liquid PO intake over the past 24 hrs    1. Small bowel series later today to confirm no bowel obstruction  2.  Possible D/C later today

## 2018-06-01 NOTE — DISCHARGE INSTRUCTIONS
Abdominal Pain: Care Instructions  Your Care Instructions    Abdominal pain has many possible causes. Some aren't serious and get better on their own in a few days. Others need more testing and treatment. If your pain continues or gets worse, you need to be rechecked and may need more tests to find out what is wrong. You may need surgery to correct the problem. Don't ignore new symptoms, such as fever, nausea and vomiting, urination problems, pain that gets worse, and dizziness. These may be signs of a more serious problem. Your doctor may have recommended a follow-up visit in the next 8 to 12 hours. If you are not getting better, you may need more tests or treatment. The doctor has checked you carefully, but problems can develop later. If you notice any problems or new symptoms, get medical treatment right away. Follow-up care is a key part of your treatment and safety. Be sure to make and go to all appointments, and call your doctor if you are having problems. It's also a good idea to know your test results and keep a list of the medicines you take. How can you care for yourself at home? · Rest until you feel better. · To prevent dehydration, drink plenty of fluids, enough so that your urine is light yellow or clear like water. Choose water and other caffeine-free clear liquids until you feel better. If you have kidney, heart, or liver disease and have to limit fluids, talk with your doctor before you increase the amount of fluids you drink. · If your stomach is upset, eat mild foods, such as rice, dry toast or crackers, bananas, and applesauce. Try eating several small meals instead of two or three large ones. · Wait until 48 hours after all symptoms have gone away before you have spicy foods, alcohol, and drinks that contain caffeine. · Do not eat foods that are high in fat. · Avoid anti-inflammatory medicines such as aspirin, ibuprofen (Advil, Motrin), and naproxen (Aleve).  These can cause stomach upset. Talk to your doctor if you take daily aspirin for another health problem. When should you call for help? Call 911 anytime you think you may need emergency care. For example, call if:  ? · You passed out (lost consciousness). ? · You pass maroon or very bloody stools. ? · You vomit blood or what looks like coffee grounds. ? · You have new, severe belly pain. ?Call your doctor now or seek immediate medical care if:  ? · Your pain gets worse, especially if it becomes focused in one area of your belly. ? · You have a new or higher fever. ? · Your stools are black and look like tar, or they have streaks of blood. ? · You have unexpected vaginal bleeding. ? · You have symptoms of a urinary tract infection. These may include:  ¨ Pain when you urinate. ¨ Urinating more often than usual.  ¨ Blood in your urine. ? · You are dizzy or lightheaded, or you feel like you may faint. ? Watch closely for changes in your health, and be sure to contact your doctor if:  ? · You are not getting better after 1 day (24 hours). Where can you learn more? Go to http://hernando-caitlyn.info/. Enter C313 in the search box to learn more about \"Abdominal Pain: Care Instructions. \"  Current as of: March 20, 2017  Content Version: 11.4  © 3331-5792 [employer]. Care instructions adapted under license by [employer] (which disclaims liability or warranty for this information). If you have questions about a medical condition or this instruction, always ask your healthcare professional. [place] [address] any warranty or liability for your use of this information.

## 2018-06-01 NOTE — PROGRESS NOTES
Notified pt will be discharged with in the next 24-48 hours. No plan of care needs have been identified. Please encourage ambulation. CM remains available. Care Management Interventions  Mode of Transport at Discharge:  Other (see comment) (Family)  Transition of Care Consult (CM Consult): Discharge Planning  Health Maintenance Reviewed: Yes  Current Support Network: Lives with Spouse, Family Lives Nearby  Confirm Follow Up Transport: Self  Discharge Location  Discharge Placement: Home with family assistance'

## 2018-06-01 NOTE — PROGRESS NOTES
Shift summary  Patient had a calm uneventful night,abdominal pain well managed with I.v morphine,zofran and benadryl given for nausea and itching respectively. No nausea or  Vomiting noted all night,KUB done and currently NPO awaiting for OR this morning. Patient Vitals for the past 12 hrs:   Temp Pulse Resp BP SpO2   06/01/18 0304 98.3 °F (36.8 °C) 83 18 127/67 97 %   05/31/18 2235 98.1 °F (36.7 °C) 93 18 134/76 100 %     Bedside and Verbal shift change report given to [facility] (oncoming nurse) by Anne Peters RN   (offgoing nurse). Report included the following information SBAR and Kardex.

## 2018-06-01 NOTE — PROGRESS NOTES
Inpatient Daily Progress Note    Subjective:   Patient states she was still having bloating and belching with dinner last night, she denies vomiting. Pain is well controlled. She has not had nausea, has not vomited, has passed flatus, and has not had a bowel movement since Tuesday 5/29/18. Objective:     Physical Exam:  Visit Vitals    /62 (BP 1 Location: Left arm, BP Patient Position: At rest)    Pulse 89    Temp 98.4 °F (36.9 °C)    Resp 18    Wt 120 kg (264 lb 9.6 oz)    SpO2 96%    Breastfeeding No    BMI 42.71 kg/m2       Gen: Well developed, well nourished 33 y.o. female in no acute distress  Resp: clear to auscultation bilaterally, no wheezes   Cardio: Regular rate and rhythm, no murmurs, clicks, gallops or rubs, no edema  Abdomen: soft, non-tender, mildly distended, hypoactive bowel sounds, no hernias  Psych: alert and oriented to person, place and time      No results found for this or any previous visit (from the past 12 hour(s)). Assessment:     Bella Howard is a 33 y.o. female POD #17 s/p revision laparoscopic gastric bypass.   Admitted for possible SBO based on finding from an UGI on 5/23/18 showing dilated proximal small bowel, with associated bloating and n/v.        Plan:     -Continue current medications  -Small bowel follow through today  -NPO

## 2018-06-02 NOTE — PROGRESS NOTES
Progress Note  John Mason  6/1/18    Ms. [employer] still is having some bloating type sensation so we did send her downstairs for a small bowel series. I have discussed the small bowel series with Dr. Armando Palm and both he and myself feel it was totally normal with normal transit of barium throughout the small bowel. At this juncture, the patient has been tolerating liquids and I do feel she likely had a ileus related to maybe narcotic usage and she is tolerating liquids. We will go ahead and let her go home today. She will stay on a liquid diet over the weekend and on Monday she will touch base with us and if she is doing well on Monday, we will advance her to a soft diet.

## 2018-06-05 ENCOUNTER — TELEPHONE (OUTPATIENT)
Dept: SURGERY | Age: 28
End: 2018-06-05

## 2018-06-05 NOTE — DISCHARGE SUMMARY
[address]    Smeaj Mcclellan  MR#: [id number]  : 1990  ACCOUNT #: [de-identified]   ADMIT DATE: 2018  DISCHARGE DATE: 2018    ADMISSION DIAGNOSES:  Nausea and vomiting. SECONDARY DIAGNOSES:  1. Abdominal pain. 2.  Status post gastric bypass revision. 3.  Depression. 4.  Intestinal malabsorption. 5.  Suspicious upper gastrointestinal swallow study. STATEMENT OF MEDICAL NECESSITY:  The patient is a 64-year-old female who approximately 2 weeks prior to this admission had undergone an uncomplicated revision of a prior gastric bypass procedure done at [facility] approximately 11 years ago. She did well initial postop days; however, she did have some difficulty progressing with her postoperative diet. She complained of abdominal pain and a bloating sensation. She was brought to the upper GI clinic with Dr. Selam Nelson on the afternoon of 2018. She did consume oral contrast, which did show a normal distal esophagus, gastric pouch and proximal Maria Luz limb. However, as Dr. Selam Nelson carried out the study distally, he did notice some dilated loops of small bowel. Given her overall clinical picture and the upper GI findings of a possible small-bowel obstruction, she was admitted to the hospital for IV fluids and further evaluation of her radiologic studies. HOSPITAL COURSE:  The patient was admitted to the hospital through the upper GI field clinic on the afternoon of 2018 for evaluation of the above-mentioned suspicious upper GI swallow study. She was started on IV fluids. She was kept n.p.o., later that afternoon, a KUB was ordered which did show contrast material had passed from her small bowel into her proximal colon. Clinically, however, the patient did not show any improvement of her symptoms. She was held overnight. A repeat KUB on post-admit day #1 confirmed the presence of a nonobstructive bowel gas pattern.   Contrast had completely emptied her small bowel and filled her colon. The patient was showing some improvement. She was allowed to start a liquid diet, which she tolerated without difficulty. By the morning of postop admit day #2, Dr. Jodie Ruiz ordered a small bowel follow through study which confirmed the presence of no bowel obstruction. These images were reviewed with Dr. Lisbet Mendoza in the radiology department. By this point, the patient had made great improvements in her overall symptoms. She expressed desire to be discharged. DISPOSITION:  She was sent home post-admit day 2 with instructions for liquids only throughout the course of the upcoming weekend, to call the office with any questions or concerns and instructed to stop all narcotic use as the overall feeling for her symptoms was probable mild ileus related to her postop narcotic use. She was instructed to call the office with any questions or concerns.       CECE Gallardo  D: 06/05/2018 07:03     T: 06/05/2018 11:23  JOB #: [id number]

## 2018-06-05 NOTE — TELEPHONE ENCOUNTER
Patient called and stated that the only thing she is able to keep down is protein shakes. I reassured her that she is okay and that she is probably just eating too quickly. I asked her to use a baby spoon, she states that she is already doing that. I told her to take smaller spoon fulls of food, chew the first bite and set the spoon down for at least 2 minutes before taking the 2nd bite and then stop and see if everything stays down. Then the next time she eats, she can take 3 bites that way and see if it stays down. She states that she has not been pausing between bites of food and that this plan makes sense and she is excited to give it a try.

## 2018-06-19 ENCOUNTER — TELEPHONE (OUTPATIENT)
Dept: SURGERY | Age: 28
End: 2018-06-19

## 2018-06-19 DIAGNOSIS — R11.0 NAUSEA: Primary | ICD-10-CM

## 2018-06-19 RX ORDER — PROMETHAZINE HYDROCHLORIDE 12.5 MG/1
12.5 TABLET ORAL
Qty: 60 TAB | Refills: 2 | Status: SHIPPED | OUTPATIENT
Start: 2018-06-19 | End: 2018-08-16 | Stop reason: SDUPTHER

## 2018-07-13 ENCOUNTER — DOCUMENTATION ONLY (OUTPATIENT)
Dept: SURGERY | Age: 28
End: 2018-07-13

## 2018-07-13 ENCOUNTER — TELEPHONE (OUTPATIENT)
Dept: SURGERY | Age: 28
End: 2018-07-13

## 2018-08-16 DIAGNOSIS — R11.0 NAUSEA: ICD-10-CM

## 2018-08-16 RX ORDER — PROMETHAZINE HYDROCHLORIDE 12.5 MG/1
12.5 TABLET ORAL
Qty: 60 TAB | Refills: 2 | Status: SHIPPED | OUTPATIENT
Start: 2018-08-16 | End: 2018-09-24 | Stop reason: SDUPTHER

## 2018-09-24 DIAGNOSIS — R11.0 NAUSEA: ICD-10-CM

## 2018-09-25 RX ORDER — PROMETHAZINE HYDROCHLORIDE 12.5 MG/1
TABLET ORAL
Qty: 60 TAB | Refills: 2 | Status: SHIPPED | OUTPATIENT
Start: 2018-09-25 | End: 2019-01-11 | Stop reason: SDUPTHER

## 2019-01-11 DIAGNOSIS — R11.0 NAUSEA: ICD-10-CM

## 2019-01-11 RX ORDER — PROMETHAZINE HYDROCHLORIDE 12.5 MG/1
TABLET ORAL
Qty: 60 TAB | Refills: 2 | Status: SHIPPED | OUTPATIENT
Start: 2019-01-11 | End: 2019-05-09 | Stop reason: ALTCHOICE

## 2019-04-24 ENCOUNTER — DOCUMENTATION ONLY (OUTPATIENT)
Dept: SURGERY | Age: 29
End: 2019-04-24

## 2019-04-24 NOTE — PROGRESS NOTES
Per [facility] requirements;  E-mail and letter sent for follow up appointment. [employer] [facility] [place]  [employer] [facility]  [facility]      Dear Patient,    Your health is our main concern. It is important for your health to have follow-up lab work and to see you surgeon at 2 months, 4 months, 6 months, 9 months and annually after your weight loss surgery. Additionally, the Department of Bariatric Surgery at our hospital is a member of the [employer] [address] Surgical Quality Improvement Program ([facility] NSQIP). As a participant in this program, we gather information on the outcomes of our patients after surgery. Please call the office for a follow up appointment at [phone]. If you have moved out of the area or have changed surgeons please call us and let us know the name of your doctor. Your health and feedback are important to us. We greatly appreciate your response.        Thank you,  [facility] [address] [facility]

## 2019-04-24 NOTE — LETTER
[address] [place] [address] Loss [address] [facility] 
 
 
Dear Patient, Your health is our main concern. It is important for your health to have follow-up lab work and to see you surgeon at 2 months, 4 months, 6 months, 9 months and annually after your weight loss surgery. Additionally, the Department of Bariatric Surgery at our hospital is a member of the [employer] [address] Surgical Quality Improvement Program ([facility] NSQIP). As a participant in this program, we gather information on the outcomes of our patients after surgery. Please call the office for a follow up appointment at [phone]. If you have moved out of the area or have changed surgeons please call us and let us know the name of your doctor. Your health and feedback are important to us. We greatly appreciate your response. Thank you, [address] [place] [address] Loss [address]

## 2019-05-09 ENCOUNTER — OFFICE VISIT (OUTPATIENT)
Dept: SURGERY | Age: 29
End: 2019-05-09

## 2019-05-09 ENCOUNTER — CLINICAL SUPPORT (OUTPATIENT)
Dept: SURGERY | Age: 29
End: 2019-05-09

## 2019-05-09 ENCOUNTER — HOSPITAL ENCOUNTER (OUTPATIENT)
Dept: LAB | Age: 29
Discharge: HOME | End: 2019-05-09
Attending: SPECIALIST
Payer: COMMERCIAL

## 2019-05-09 VITALS
OXYGEN SATURATION: 99 % | DIASTOLIC BLOOD PRESSURE: 75 MMHG | HEIGHT: 66 IN | TEMPERATURE: 97.6 F | SYSTOLIC BLOOD PRESSURE: 118 MMHG | HEART RATE: 84 BPM | BODY MASS INDEX: 44.55 KG/M2 | WEIGHT: 277.2 LBS

## 2019-05-09 VITALS — WEIGHT: 276 LBS | HEIGHT: 66 IN | BODY MASS INDEX: 44.36 KG/M2

## 2019-05-09 DIAGNOSIS — F32.A DEPRESSION, UNSPECIFIED DEPRESSION TYPE: ICD-10-CM

## 2019-05-09 DIAGNOSIS — K90.9 INTESTINAL MALABSORPTION, UNSPECIFIED TYPE: ICD-10-CM

## 2019-05-09 DIAGNOSIS — Z98.84 H/O GASTRIC BYPASS: Primary | ICD-10-CM

## 2019-05-09 DIAGNOSIS — K90.9 INTESTINAL MALABSORPTION, UNSPECIFIED TYPE: Primary | ICD-10-CM

## 2019-05-09 DIAGNOSIS — Z98.84 H/O GASTRIC BYPASS: ICD-10-CM

## 2019-05-09 DIAGNOSIS — R73.03 BORDERLINE DIABETES: ICD-10-CM

## 2019-05-09 LAB
25(OH)D3 SERPL-MCNC: 13.6 NG/ML (ref 30–100)
FERRITIN SERPL-MCNC: 9 NG/ML (ref 8–388)
FOLATE SERPL-MCNC: >20 NG/ML (ref 3.1–17.5)
IRON SERPL-MCNC: 84 UG/DL (ref 50–175)
T4 FREE SERPL-MCNC: 1 NG/DL (ref 0.7–1.5)
VIT B12 SERPL-MCNC: 374 PG/ML (ref 211–911)

## 2019-05-09 PROCEDURE — 84439 ASSAY OF FREE THYROXINE: CPT

## 2019-05-09 PROCEDURE — 82728 ASSAY OF FERRITIN: CPT

## 2019-05-09 PROCEDURE — 83540 ASSAY OF IRON: CPT

## 2019-05-09 PROCEDURE — 82306 VITAMIN D 25 HYDROXY: CPT

## 2019-05-09 PROCEDURE — 82607 VITAMIN B-12: CPT

## 2019-05-09 PROCEDURE — 84425 ASSAY OF VITAMIN B-1: CPT

## 2019-05-09 PROCEDURE — 36415 COLL VENOUS BLD VENIPUNCTURE: CPT

## 2019-05-09 RX ORDER — HYDROXYZINE HYDROCHLORIDE 10 MG/1
TABLET, FILM COATED ORAL
COMMUNITY
Start: 2019-04-28 | End: 2021-01-28

## 2019-05-09 RX ORDER — BUSPIRONE HYDROCHLORIDE 10 MG/1
10 TABLET ORAL
COMMUNITY
End: 2021-01-28

## 2019-05-09 RX ORDER — VENLAFAXINE HYDROCHLORIDE 150 MG/1
CAPSULE, EXTENDED RELEASE ORAL
COMMUNITY
Start: 2019-04-29 | End: 2021-01-28

## 2019-05-09 NOTE — PATIENT INSTRUCTIONS
Patient Instructions 1. Remember hydration goals - minimum of 64 ounces of liquids per day (dehydration is the number one reason for hospital readmission). 2. Continue to monitor carbohydrate and protein intake you need a minimum of  Grams of protein daily- remember to keep your total carbohydrates to 50 grams or less per day for best results. 3. Continue to work towards exercise goals - 60-90 minutes, 5 times a week minimum of deliberate, aerobic exercise is the ultimate goal with strength training 2 times each week. Refer to iiyuma for  information. 4. Remember to take vitamins as directed. 5. Attend support group the 2nd Thursday of each month. 6. Use Miralax if you become constipated. 7. Call us at [phone] or email us through [email or web address]" with questions,     concerns or worsening of condition, we have someone on call 24 hours a day. If you are unable to reach our office, you are to go to your Primary Care Physician or the Emergency Department. 8. If any labs have been ordered as a part of this visit, you will only be contacted if found to be abnormal. If you would like to look at the results for yourself, you can sign up for 'My Chart\". [facility] office staff can offer you assistance with setting that up. Supplement Resource Guide Importance of Protein:  
Maintains lean body mass, produces antibodies to fight off infections, heals wounds, minimizes hair loss, helps to give you energy, helps with satiety, and keeping you full between meals. Importance of Calcium: 
Needed for healthy bones and teeth, normal blood clotting, and nervous system functioning, higher risk of osteoporosis and bone disease with non-compliance. Importance of Multivitamins: Many functions. Supply you with extra nutrients that you may be missing from food.   May lead to iron deficiency anemia, weakness, fatigue, and many other symptoms with non-compliance. Importance of B Vitamins: 
Important for red blood cell formation, metabolism, energy, and helps to maintain a healthy nervous system. Protein Supplement Find one you like now. Use immediately after surgery. Look for: 
35-50g protein each day from your protein supplement once you reach the progression diet. 0-3 g fat per serving 0-3 g sugar per serving Protein drinks should be split in separate dosages. Recommend: Lifelong 1 year +Calcium Supplement:  
 
Start taking within a month after surgery. Look for: Calcium Citrate Plus D (1500 mg per day) Recommend: Citracal 
 
 . Avoid chocolate chewable calcium. Can use chewable bariatric or [employer] brand or similar chewable. The body cannot absorb more than 500-600 mg of calcium at a time. Take for Life Multi-vitamin Supplement:   
Start immediately after surgery: any complete chewable, such as: Sumners Complete chewables. Avoid Sumner sours or gummies. They lack iron and other important nutrients and also have added sugar. Continue with chewable vitamin or change to adult complete multivitamin one month after surgery. Menstruating women can take a prenatal vitamin. Make sure has at least 18 mg iron and 564-466 mcg folic acid Vitamin B12, B Complex Vitamin, and Biotin Start taking within a month after surgery. Vitamin B12:  1000 mcg of Vitamin B12 three times weekly Must take sublingually (meaning you take it under your tongue) or in a liquid drop form for easy absorption. B Complex Vitamin: Take a pill or liquid drop form once daily. Biotin: This vitamin can help prevent hair loss. Recommend 5mg  
(5000 mcg) a day Biotin is Optional  
 
 
 
Walking for Exercise: Care Instructions Your Care Instructions Walking is one of the easiest ways to get the exercise you need for good health.  A brisk, 30-minute walk each day can help you feel better and have more energy. It can help you lower your risk of disease. Walking can help you keep your bones strong and your heart healthy. Check with your doctor before you start a walking plan if you have heart problems, other health issues, or you have not been active in a long time. Follow your doctor's instructions for safe levels of exercise. Follow-up care is a key part of your treatment and safety. Be sure to make and go to all appointments, and call your doctor if you are having problems. It's also a good idea to know your test results and keep a list of the medicines you take. How can you care for yourself at home? Getting started · Start slowly and set a short-term goal. For example, walk for 5 or 10 minutes every day. · Bit by bit, increase the amount you walk every day. Try for at least 30 minutes on most days of the week. You also may want to swim, bike, or do other activities. · If finding enough time is a problem, it is fine to be active in blocks of 10 minutes or more throughout your day and week. · To get the heart-healthy benefits of walking, you need to walk briskly enough to increase your heart rate and breathing, but not so fast that you cannot talk comfortably. · Wear comfortable shoes that fit well and provide good support for your feet and ankles. Staying with your plan · After you've made walking a habit, set a longer-term goal. You may want to set a goal of walking briskly for longer or walking farther. Experts say to do 2½ hours of moderate activity a week. A faster heartbeat is what defines moderate-level activity. · To stay motivated, walk with friends, coworkers, or pets. · Use a phone sherice or pedometer to track your steps each day. Set a goal to increase your steps. Once you get there, set a higher goal. Aim for 10,000 steps a day. · If the weather keeps you from walking outside, go for walks at the mall with a friend. Local schools and churches may have indoor gyms where you can walk. Fitting a walk into your workday · Park several blocks away from work, or get off the bus a few stops early. · Use the stairs instead of the elevator, at least for a few floors. · Suggest holding meetings with colleagues during a walk inside or outside the building. · Use the restroom that is the farthest from your desk or workstation. · Use your morning and afternoon breaks to take quick 15-minute walks. Staying safe · Know your surroundings. Walk in a well-lighted, safe place. If it is dark, walk with a partner. Wear light-colored clothing. If you can, buy a vest or jacket that reflects light. · Carry a cell phone for emergencies. · Drink plenty of water. Take a water bottle with you when you walk. This is very important if it is hot out. · Be careful not to slip on wet or icy ground. You can buy \"grippers\" for your shoes to help keep you from slipping. · Pay attention to your walking surface. Use sidewalks and paths. · If you have breathing problems like asthma or COPD, ask your doctor when it is safe for you to walk outdoors. Cold, dry air, smog, pollen, or other things in the air could cause breathing problems. Where can you learn more? Go to http://hernando-caitlyn.info/. Enter R159 in the search box to learn more about \"Walking for Exercise: Care Instructions. \" Current as of: December 7, 2017 Content Version: 11.8 © 4147-8307 [employer]. Care instructions adapted under license by [employer] (which disclaims liability or warranty for this information). If you have questions about a medical condition or this instruction, always ask your healthcare professional. [place] [address] any warranty or liability for your use of this information. Dr. Vic Méndez  [phone]  [address], [address], [facility] Dr. Kaylie Conteh  [phone]  [place], [address] [phone]  [place], [address] [facility],  Jazmyn Schulz [place] [employer] Chestine Mings  [phone] [address], [place], [address] [place] [address] Eating Disorder Hope Website www.eatingdisorderhope. com National Eating Disorders Hotline 631-761-5041 [employer]   https://oatidewater. org/index.html 
of Overeaters Anonymous

## 2019-05-09 NOTE — PROGRESS NOTES
Pavan Key  is a 33 y.o. female who presents for follow-up about 1 year following revision of gastric bypass. Original gastric bypass done 12 years ago at [facility]. Of note, we have not seen her since her 2 week postop visit on 5/30/18 when she weighed 264 lbs. She has gained 1 pound since surgery. Body mass index is 44.74 kg/m². . EBWL is (0%). Visit Vitals  Ht 5' 6\" (1.676 m)   Wt 125.2 kg (276 lb)   BMI 44.55 kg/m²       Vitamins: Biotin.-Patient was doing Nascobal but stopped when insurance changed, has not taken vitamins or minerals in > 6 months. Typical intake is as follows:  Breakfast: SKIPS -consuming 1 - 20 oz diet soda  Lunch:1 slice of toast with butter and regular jelly  Snack: Lunchable with cheese and deli meat   Dinner: Lunchable with cheese and deli meat OR pasta with marinara or danica sauce   Snacks: Chips, sweets (chocolate, cookie)   Fluids: Diet soda, 20 oz water    Exercise:  Do you currently have an exercise routine? no    Goals:   1. Work to increase to 3 meals per day, with planned snacks as needed. Recommend following plate method for meal planning - focusing on lean protein, non-starchy vegetables, and measured amounts of starch. - Goal of  g protein and 50 g carbohydrate per day.     Incorporate more lean protein - aim to eat protein first at meals   Aim for ½ your plate non starchy vegetables   Drink 64 oz of fluid per day (avoid caffeine, carbonation, and added sugar)   Recommend reinstating protein supplement as meal replacement OR high protein snack option    Follow the 30-30 rule, do no drink fluid 30 mins before or after meal   Eliminate fried foods   Eliminate snacking on refined carbohydrates      Exercise for 30 minutes per day or a total of 150 minutes/week    - Reinstate all vitamins and minerals   Galvas Complete chewables - 2x/day  Take 1000 mcg of Vitamin B12 three times weekly   Calcium Citrate Plus D (1500 mg per day) 500 mg 3 times daily. Calcium needs to be taken at least an hour apart from your other vitamin and mineral supplements.   F/u: 1 month    Willi Husain RD

## 2019-05-09 NOTE — PROGRESS NOTES
Subjective:  
 
Blane Rinne  is a 33 y.o. female who presents for follow-up about 1 year following revision of gastric bypass. Original gastric bypass done 12 years ago at [facility]. Of note, we have not seen her since her 2 week postop visit on 5/30/18 when she weighed 264 lbs. She has gained 1 pound since surgery. Body mass index is 44.74 kg/m². . EBWL is (0%). The patient presents today to assess their progress toward their goal of weight loss and to address any issues that may be present. Today the patient and I have reviewed their diet and how appropriate their food choices are. The following issues have been identified - weight gain. States she did really well first six months and had gotten down to 240 lbs, then had medications change, had depression worsen and weight has all come back even though she has no appetite and hardly eats. Has been down in [facility] for awhile, now back in [place]. Psychiatrist wants to know if her weight gain could be related to her thyroid. She has stopped exercising. Surgery related complication: NA She reports nausea and vomiting after eating chicken and most other meats/protein and can only tolerate carbs. Denies abdominal pain and diarrhea. The patients diet choices have been reviewed today and are as follows: only appeal to sweets, throwing up chicken and salad Breakfast: skips Lunch: sometimes eats around 2 pm and will eat lunchable Snack: at night wants carbs, sweets or lunchable Supper :eating and cracker lunchable Patients pain score:0/10 The patient's exercise level: not active. Changes in her medical history and medications have been reviewed. Comorbidities: Hypertension: not applicable Diabetes: not applicable Obstructive Sleep Apnea: not applicable Hyperlipidemia: not applicable Stress Urinary Incontinence: not applicable Gastroesophageal Reflux: not applicable Weight related arthropathy:not applicable Patient Active Problem List  
Diagnosis Code  Morbid obesity due to excess calories ([facility]) E66.01  
 H/O gastric bypass Z98.84  
 Iron deficiency anemia secondary to inadequate dietary iron intake D50.8  B12 deficiency E53.8  Morbid obesity with BMI of 40.0-44.9, adult ([facility]) E66.01, Z68.41  
 Morbid obesity ([facility]) E66.01  
 Depression F32.9  Borderline diabetes R73.03  
 Intestinal malabsorption K90.9  Psychotic disorder ([facility] F29  
 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  
 SBO (small bowel obstruction) ([facility] K56.609  Abdominal pain R10.9 Past Medical History:  
Diagnosis Date  Depression   
 anxiety  Dilation of stomach  Intestinal malabsorption  [facility]  Morbid obesity with BMI of 40.0-44.9, adult ([facility] Past Surgical History:  
Procedure Laterality Date  HX GI    
 gastric bypass-2007  HX OTHER SURGICAL    
 egd Current Outpatient Medications Medication Sig Dispense Refill  hydrOXYzine HCl (ATARAX) 10 mg tablet  venlafaxine-SR (EFFEXOR-XR) 150 mg capsule  busPIRone (BUSPAR) 10 mg tablet Take 10 mg by mouth.  MULTIVITS,CA,MINERALS/IRON/FA (ONE-A-DAY WOMENS FORMULA PO) Take  by mouth.  promethazine (PHENERGAN) 12.5 mg tablet TAKE 1 TAB BY MOUTH EVERY SIX (6) HOURS AS NEEDED FOR NAUSEA. 60 Tab 2  
 ondansetron (ZOFRAN ODT) 4 mg disintegrating tablet Take 1 Tab by mouth every eight (8) hours as needed for Nausea. 30 Tab 1  
 FLUoxetine (PROZAC) 40 mg capsule Take 60 mg by mouth nightly.  clonazePAM (KLONOPIN) 0.5 mg tablet TAKE 2 TABLET BY MOUTH EVERY DAY AS NEEDED PANIC/ANXIETY  0  
 L-Norgest&E Estradiol-E Estrad 0.15 mg-30 mcg (84)/10 mcg (7) 3MPk TAKE 1 TABLET BY MOUTH DAILY  2 Review of Symptoms:  
 
 
General - No history or complaints of unexpected fever or chills Head/Neck - No history or complaints of headache or dizziness Cardiac - No history or complaints of chest pain, palpitations, or shortness of breath Pulmonary - No history or complaints of shortness of breath or productive cough Gastrointestinal - as noted above Genitourinary - No history or complaints of hematuria/dysuria or renal lithiasis Musculoskeletal - No history or complaints of joint  muscular weakness Hematologic - No history of any bleeding episodes Neurologic - No history or complaints of  migraine headaches or neurologic symptoms Objective:  
 
Visit Vitals /75 (BP 1 Location: Right arm, BP Patient Position: Sitting) Pulse (!) 110 Temp 97.6 °F (36.4 °C) Ht 5' 6\" (1.676 m) Wt 125.7 kg (277 lb 3.2 oz) SpO2 99% BMI 44.74 kg/m² Physical Exam: 
 
General:  alert, cooperative, no distress, appears stated age Lungs:   clear to auscultation bilaterally Heart:  Regular rate and rhythm, S1S2 present or without murmur or extra heart sounds Abdomen:   abdomen is soft without significant tenderness, masses, organomegaly or guarding; Incisions: Well healed Lab Results Component Value Date/Time WBC 10.5 05/30/2018 05:17 PM  
 HGB 13.8 05/30/2018 05:17 PM  
 HCT 42.0 05/30/2018 05:17 PM  
 PLATELET 063 77/25/3878 05:17 PM  
 MCV 83.3 05/30/2018 05:17 PM  
 
Lab Results Component Value Date/Time Sodium 140 05/30/2018 05:17 PM  
 Potassium 4.0 05/30/2018 05:17 PM  
 Chloride 106 05/30/2018 05:17 PM  
 CO2 22 05/30/2018 05:17 PM  
 Anion gap 12 05/30/2018 05:17 PM  
 Glucose 86 05/30/2018 05:17 PM  
 BUN 17 05/30/2018 05:17 PM  
 Creatinine 0.82 05/30/2018 05:17 PM  
 BUN/Creatinine ratio 21 (H) 05/30/2018 05:17 PM  
 GFR est AA >60 05/30/2018 05:17 PM  
 GFR est non-AA >60 05/30/2018 05:17 PM  
 Calcium 8.3 (L) 05/30/2018 05:17 PM  
 Bilirubin, total 0.3 05/07/2018 12:29 PM  
 AST (SGOT) 14 (L) 05/07/2018 12:29 PM  
 Alk.  phosphatase 86 05/07/2018 12:29 PM  
 Protein, total 7.8 05/07/2018 12:29 PM  
 Albumin 3.5 05/07/2018 12:29 PM  
 Globulin 4.3 (H) 05/07/2018 12:29 PM  
 A-G Ratio 0.8 05/07/2018 12:29 PM  
 ALT (SGPT) 21 05/07/2018 12:29 PM  
 
Lab Results Component Value Date/Time Iron 84 02/28/2018 12:55 PM  
 TIBC 718 (H) 12/21/2016 01:00 PM  
 Iron % saturation 3 12/21/2016 01:00 PM  
 Ferritin 9 02/28/2018 12:55 PM  
 
Lab Results Component Value Date/Time Folate 16.1 02/28/2018 12:55 PM  
 
Lab Results Component Value Date/Time Vitamin D 25-Hydroxy 16.5 (L) 02/28/2018 12:55 PM  
   
Reviewed labs in [facility] from ER visit 4/29/19 TSH 1.00 [id number] Crt 0.6 Mg 2.0 Assessment:  
 
1. History of Morbid obesity, status post  laparoscopic gastric bypass surgery. The patient has weight gain to preoperative weight with poor dietary intake and lack of exercise. Spent greater than 20 minutes providing basic education about decreasing carbs to below 50g, increasing protein to at least 80g and increasing total daily calories to 800. Is meeting with dietitian after this appointment to get back on track. Will get other labs. Must increase exercise. Given names of counselors in area who specializing in food addiction/eating disorders. Patient will discuss possible medication change with psychiatrist at next appointment. Plan: 1. Remember to measure portions, continue low carbohydrate diet 2. Continue to concentrate on protein intake meeting daily requirements 3. Remember vitamin supplements. The importance of such was discussed regarding the malabsorptive issues that the surgery creates. 4. Exercise regimen appears to be: inadequate, needs to get 150 minutes of cardio a week 5. Try and attend support group if feasible. 6. Follow-up in 1 month(s). 7. Lab reviewed and appropriate changes made. Lab slip given for 1 year postop labs 8. Total time spent with the patient 30 minutes.

## 2019-05-13 LAB — VIT B1 BLD-SCNC: 156.7 NMOL/L (ref 66.5–200)

## 2019-05-13 RX ORDER — ERGOCALCIFEROL 1.25 MG/1
50000 CAPSULE ORAL 2 TIMES WEEKLY
Qty: 52 CAP | Refills: 0 | Status: SHIPPED | OUTPATIENT
Start: 2019-05-13 | End: 2019-11-09

## 2019-05-13 NOTE — PROGRESS NOTES
Please call patient and let her know Vit D is low and will send Rx to pharmacy. Needs to be sure taking sublingual B12 1000mcg daily and using MVI with iron.   
Thank you

## 2019-05-14 NOTE — PROGRESS NOTES
Spoke with pt she is aware of results and what Jessica stated: Please call patient and let her know Vit D is low and will send Rx to pharmacy.  Needs to be sure taking sublingual B12 1000mcg daily and using MVI with iron

## 2020-04-24 ENCOUNTER — DOCUMENTATION ONLY (OUTPATIENT)
Dept: SURGERY | Age: 30
End: 2020-04-24

## 2020-04-24 NOTE — PROGRESS NOTES
Per [facility] requirements;  E-mail and letter sent for follow up appointment. [facility] [place] [facility] [place]  [facility]  [facility]      Dear Patient,    Your health is our main concern. It is important for your health to have follow-up lab work and to see you surgeon at 2 months, 4 months, 6 months, 9 months and annually after your weight loss surgery. Additionally, the Department of Bariatric Surgery at our hospital is a member of the [employer] [address] Surgical Quality Improvement Program ([facility] NSQIP). As a participant in this program, we gather information on the outcomes of our patients after surgery. Please call the office for a follow up appointment at [phone]. If you have moved out of the area or have changed surgeons please call us and let us know the name of your doctor. Your health and feedback are important to us. We greatly appreciate your response.        Thank you,  [facility]

## 2020-06-04 ENCOUNTER — DOCUMENTATION ONLY (OUTPATIENT)
Dept: SURGERY | Age: 30
End: 2020-06-04

## 2020-06-04 NOTE — PROGRESS NOTES
Three messages left for patient regarding virtual visit yearly appt scheduled for 2pm on 6/4/20. Last of which was at left at 1357. She can be rescheduled for later date.

## 2021-01-26 NOTE — PROGRESS NOTES
Subjective:      Eusebio Hodgkin is a 35 y.o. female is now 2.75 years status post laparoscopic gastric bypass surgery. Doing well overall. She has gained a total of 1 pounds since surgery. Body mass index is 44.78 kg/m². Has lost 0% of EBW. Currently on a solid food diet with difficulty, reports feels like food is getting stuck in her esophagus and she will have to vomit to get the food out, the happens approximately twice a week. Denies abdominal pain. Taking in >64oz water daily. Sources of protein include protein shakes, chicken, cheese and eggs. She is not exercising. Patient is sleeping 7-8 hours a night on average. Patient needs labs    She had a baby last Feb and gained 20 pounds during her pregnancy and she has lost 15 of those pounds. She states that she feels her revision surgery has not changed her situation and she is very frustrated. She is starting to develop knee pain and she is concerned about her future health. Bowel movements are regular. The patient is not having any pain. The patient is not compliant with multivitamins, calcium, Vit D and B12 supplements.      Weight Loss Metrics 1/28/2021 5/9/2019 5/9/2019 5/30/2018 5/30/2018 5/30/2018 5/15/2018   Today's Wt 277 lb 7 oz 276 lb 277 lb 3.2 oz 264 lb 9.6 oz 257 lb 8 oz 259 lb 3.2 oz 280 lb 4.8 oz   BMI 44.78 kg/m2 44.55 kg/m2 44.74 kg/m2 42.71 kg/m2 41.56 kg/m2 41.84 kg/m2 45.24 kg/m2       Comorbidities:    Hypertension: not applicable  Diabetes: not applicable  Obstructive Sleep Apnea: not applicable  Hyperlipidemia: not applicable  Stress Urinary Incontinence: not applicable  Gastroesophageal Reflux: not applicable  Weight related arthropathy:not applicable     Patient Active Problem List   Diagnosis Code    Morbid obesity due to excess calories ([facility]) E66.01    H/O gastric bypass Z98.84    Iron deficiency anemia secondary to inadequate dietary iron intake D50.8    B12 deficiency E53.8    Morbid obesity with BMI of 40.0-44.9, adult ([facility] E66.01, Z68.41    Morbid obesity ([facility]) E66.01    Depression F32.9    Borderline diabetes R73.03    Intestinal malabsorption K90.9    Psychotic disorder ([facility] F29    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    SBO (small bowel obstruction) ([facility] K56.609    Abdominal pain R10.9        Past Medical History:   Diagnosis Date    Depression     anxiety    Dilation of stomach     Intestinal malabsorption     Morbid obesity ([facility]     Morbid obesity with BMI of 40.0-44.9, adult ([facility])        Past Surgical History:   Procedure Laterality Date    HX GI      gastric bypass-2007    HX OTHER SURGICAL      egd           Allergies   Allergen Reactions    Morphine Swelling       Review of Systems:  General - No history or complaints of unexpected fever or chills  Head/Neck - No history or complaints of headache or dizziness  Cardiac - No history or complaints of chest pain, palpitations, or shortness of breath  Pulmonary - No history or complaints of shortness of breath or productive cough  Gastrointestinal - as noted above  Genitourinary - No history or complaints of hematuria/dysuria or renal lithiasis  Musculoskeletal - No history or complaints of joint  muscular weakness  Hematologic - No history of any bleeding episodes  Neurologic - No history or complaints of  migraine headaches or neurologic symptoms    Objective:     Visit Vitals  BP (!) 140/54 (BP 1 Location: Left upper arm, BP Patient Position: Sitting)   Pulse (!) 107   Temp 98.2 °F (36.8 °C)   Ht 5' 6\" (1.676 m)   Wt 125.8 kg (277 lb 7 oz)   SpO2 100%   BMI 44.78 kg/m²       General:  alert, cooperative, no distress, appears stated age   Chest: lungs clear to auscultation, breath sounds equal and symmetric, no rhonchi, rales or wheezes, no accessory muscle use   Cor:   Regular rate and rhythm or without murmur or extra heart sounds   Abdomen: soft, bowel sounds active, non-tender, no masses or organomegaly   Incisions:   healing well, no drainage, no erythema, no hernia, no seroma, no swelling, no dehiscence, incision well approximated           Assessment and Plan   1. Intestinal malabsorption  - Continue required Vitamins: B12, B complex, D, iron, calcium, multivitamin  2. S/p laparoscopic bariatric surgery, GASTRIC BYPASS, history of morbid obesity  - Sleep goal is 7-9 hours each night. Patient education given on the effects of sleep deprivation on weight control.  - Discussed patients weight loss goals and dietary choices in relation to goals. - Reminded to measure portions, continue high protein, low carbohydrate diet. Reminded to eat regularly, to eat slowly & not to drink with meals.    - Continue cardio exercise and add resistance exercises. 60-90 minutes of aerobic activity 5 days a week and strength training 2 days each week. - Encouraged to attend support group   - Required fluid intake is >64oz daily of sugar free beverages. 3.  Dysphagia   - UGI  4. Poor weight loss   - EKG to assess cardiac function, if wnl, will place on low dose phentermine and topiramate. Labs ordered today  Follow up in 6 months or sooner if patient has questions, concerns or worsening of condition. If unable to reach our office and receive immediate care, patient should go to the Emergency Department immediately. Ms. Dao Sterling has a reminder for a \"due or due soon\" health maintenance. I have asked that she contact her primary care provider for a follow-up on this health maintenance.

## 2021-01-28 ENCOUNTER — HOSPITAL ENCOUNTER (OUTPATIENT)
Dept: NON INVASIVE DIAGNOSTICS | Age: 31
Discharge: HOME | End: 2021-01-28
Payer: COMMERCIAL

## 2021-01-28 ENCOUNTER — HOSPITAL ENCOUNTER (OUTPATIENT)
Dept: LAB | Age: 31
Discharge: HOME | End: 2021-01-28
Payer: COMMERCIAL

## 2021-01-28 ENCOUNTER — OFFICE VISIT (OUTPATIENT)
Dept: SURGERY | Age: 31
End: 2021-01-28
Payer: COMMERCIAL

## 2021-01-28 VITALS
WEIGHT: 277.44 LBS | DIASTOLIC BLOOD PRESSURE: 54 MMHG | HEART RATE: 107 BPM | BODY MASS INDEX: 44.59 KG/M2 | SYSTOLIC BLOOD PRESSURE: 140 MMHG | OXYGEN SATURATION: 100 % | TEMPERATURE: 98.2 F | HEIGHT: 66 IN

## 2021-01-28 DIAGNOSIS — K90.9 INTESTINAL MALABSORPTION, UNSPECIFIED TYPE: ICD-10-CM

## 2021-01-28 DIAGNOSIS — R94.31 PROLONGED Q-T INTERVAL ON ECG: ICD-10-CM

## 2021-01-28 DIAGNOSIS — K90.9 INTESTINAL MALABSORPTION, UNSPECIFIED TYPE: Primary | ICD-10-CM

## 2021-01-28 DIAGNOSIS — Z98.84 S/P BARIATRIC SURGERY: ICD-10-CM

## 2021-01-28 LAB
25(OH)D3 SERPL-MCNC: 12.2 NG/ML (ref 30–100)
ALBUMIN SERPL-MCNC: 4.1 G/DL (ref 3.4–5)
ALBUMIN/GLOB SERPL: 1.1 {RATIO} (ref 0.8–1.7)
ALP SERPL-CCNC: 141 U/L (ref 45–117)
ALT SERPL-CCNC: 137 U/L (ref 13–56)
ANION GAP SERPL CALC-SCNC: 3 MMOL/L (ref 3–18)
AST SERPL-CCNC: 54 U/L (ref 10–38)
ATRIAL RATE: 84 BPM
BASOPHILS # BLD: 0 K/UL (ref 0–0.1)
BASOPHILS NFR BLD: 0 % (ref 0–2)
BILIRUB SERPL-MCNC: 0.3 MG/DL (ref 0.2–1)
BUN SERPL-MCNC: 12 MG/DL (ref 7–18)
BUN/CREAT SERPL: 18 (ref 12–20)
CALCIUM SERPL-MCNC: 8.6 MG/DL (ref 8.5–10.1)
CALCULATED P AXIS, ECG09: 74 DEGREES
CALCULATED R AXIS, ECG10: 100 DEGREES
CALCULATED T AXIS, ECG11: 49 DEGREES
CHLORIDE SERPL-SCNC: 109 MMOL/L (ref 100–111)
CO2 SERPL-SCNC: 29 MMOL/L (ref 21–32)
CREAT SERPL-MCNC: 0.65 MG/DL (ref 0.6–1.3)
DIAGNOSIS, 93000: NORMAL
DIFFERENTIAL METHOD BLD: ABNORMAL
EOSINOPHIL # BLD: 0.1 K/UL (ref 0–0.4)
EOSINOPHIL NFR BLD: 1 % (ref 0–5)
ERYTHROCYTE [DISTWIDTH] IN BLOOD BY AUTOMATED COUNT: 14.5 % (ref 11.6–14.5)
FERRITIN SERPL-MCNC: 6 NG/ML (ref 8–388)
FOLATE SERPL-MCNC: 8.7 NG/ML (ref 3.1–17.5)
GLOBULIN SER CALC-MCNC: 3.8 G/DL (ref 2–4)
GLUCOSE SERPL-MCNC: 82 MG/DL (ref 74–99)
HCT VFR BLD AUTO: 37.6 % (ref 35–45)
HGB BLD-MCNC: 11.9 G/DL (ref 12–16)
IRON SERPL-MCNC: 30 UG/DL (ref 50–175)
LYMPHOCYTES # BLD: 3 K/UL (ref 0.9–3.6)
LYMPHOCYTES NFR BLD: 38 % (ref 21–52)
MCH RBC QN AUTO: 24.8 PG (ref 24–34)
MCHC RBC AUTO-ENTMCNC: 31.6 G/DL (ref 31–37)
MCV RBC AUTO: 78.3 FL (ref 74–97)
MONOCYTES # BLD: 0.5 K/UL (ref 0.05–1.2)
MONOCYTES NFR BLD: 6 % (ref 3–10)
NEUTS SEG # BLD: 4.3 K/UL (ref 1.8–8)
NEUTS SEG NFR BLD: 55 % (ref 40–73)
P-R INTERVAL, ECG05: 168 MS
PLATELET # BLD AUTO: 310 K/UL (ref 135–420)
PMV BLD AUTO: 9.9 FL (ref 9.2–11.8)
POTASSIUM SERPL-SCNC: 4 MMOL/L (ref 3.5–5.5)
PROT SERPL-MCNC: 7.9 G/DL (ref 6.4–8.2)
Q-T INTERVAL, ECG07: 390 MS
QRS DURATION, ECG06: 98 MS
QTC CALCULATION (BEZET), ECG08: 460 MS
RBC # BLD AUTO: 4.8 M/UL (ref 4.2–5.3)
SODIUM SERPL-SCNC: 141 MMOL/L (ref 136–145)
VENTRICULAR RATE, ECG03: 84 BPM
VIT B12 SERPL-MCNC: 452 PG/ML (ref 211–911)
WBC # BLD AUTO: 7.8 K/UL (ref 4.6–13.2)

## 2021-01-28 PROCEDURE — 80053 COMPREHEN METABOLIC PANEL: CPT

## 2021-01-28 PROCEDURE — 83540 ASSAY OF IRON: CPT

## 2021-01-28 PROCEDURE — 36415 COLL VENOUS BLD VENIPUNCTURE: CPT

## 2021-01-28 PROCEDURE — 84425 ASSAY OF VITAMIN B-1: CPT

## 2021-01-28 PROCEDURE — 93005 ELECTROCARDIOGRAM TRACING: CPT

## 2021-01-28 PROCEDURE — 82607 VITAMIN B-12: CPT

## 2021-01-28 PROCEDURE — 85025 COMPLETE CBC W/AUTO DIFF WBC: CPT

## 2021-01-28 PROCEDURE — 82728 ASSAY OF FERRITIN: CPT

## 2021-01-28 PROCEDURE — 99214 OFFICE O/P EST MOD 30 MIN: CPT | Performed by: PHYSICIAN ASSISTANT

## 2021-01-28 PROCEDURE — 82306 VITAMIN D 25 HYDROXY: CPT

## 2021-01-28 NOTE — PATIENT INSTRUCTIONS
Patient Instructions 1. Remember hydration goals - minimum of 64 ounces of liquids per day (dehydration is the number one reason for hospital readmission). 2. Sleep 7-9 hours each night to keep your metabolism up. 3. Continue to monitor carbohydrate and protein intake you need a minimum of  Grams of protein daily- remember to keep your total carbohydrates to 50 grams or less per day for best results. 4. To maximize weight loss keep your caloric intake between 800-1,200 calories daily. If you are exercising excessively, such as training for a marathon, you need to keep a food log and meet with the dietician so they can advise you on your diet choices, carbohydrate intake and caloric intake. 5. Continue to work towards exercise goals - 60-90 minutes, 5 times a week minimum of deliberate, aerobic exercise is the ultimate goal with strength training 2 times each week. Refer to [employer] for  information. 6. Remember to take vitamins as directed in your handbook. 7. Attend support group the 2nd Thursday of each month. 8. Constipation: Milk of Magnesia is for immediate relief only. Miralax is to be used every day if constipation is a chronic problem. 9. Diarrhea: patients will occasionally develop lactose intolerance after surgery. Check to see if your protein shake has whey in it. If it does try a protein powder or drink that does not have whey and stop all yogurts, cheeses and milks to see if the diarrhea goes away. 10. If you have had labs drawn. We will only call you if you have abnormal results. Otherwise you can access the lab results in \"mychart\". You will only need the access code the first time you sign on. 11. Call us at [phone] or email us through [email or web address]" with questions,     concerns or worsening of condition, we have someone on call 24 hours a day. If you are unable to reach our office, you are to go to your Primary Care Physician or the Emergency Department. NOTE TO GASTRIC BYPASS PATIENTS:  (SAME APPLIES TO GASTRIC SLEEVE PATIENTS FOR FIRST TWO MONTHS) Remember that for the rest of your life, you are not able to take the following: 
- NSAIDs (ibuprofen, goody powder, BC powder, Motrin, Advil, Mobic, Voltaren, Excedrin, etc.) - Steroid pills or injections - Smoke (cigarettes or recreational drugs) - Alcohol Use of any of the above may cause ulcers in your stomach which may perforate causing a medical emergency and surgery. Speak to our medical staff if another medical provider requires you to take steroids or NSAIDs. Supplement Resource Guide Importance of Protein:  
Maintains lean body mass, produces antibodies to fight off infections, heals wounds, minimizes hair loss, helps to give you energy, helps with satiety, and keeping you full between meals. Importance of Calcium: 
Needed for healthy bones and teeth, normal blood clotting, and nervous system functioning, higher risk of osteoporosis and bone disease with non-compliance. Importance of Multivitamins: Many functions. Supply you with extra nutrients that you may be missing from food. May lead to iron deficiency anemia, weakness, fatigue, and many other symptoms with non-compliance. Importance of B Vitamins: 
Important for red blood cell formation, metabolism, energy, and helps to maintain a healthy nervous system. Protein Supplement Liquid diet phase: consume 90-100g protein daily. Once you are eating consume 35-50g protein each day from your protein supplement. 0-3 g fat per serving 0-3 g sugar per serving The body can only absorb 30g of protein at one time, so do not consume more than that at one time. Multi-vitamin Supplement:   
Start immediately after surgery: any complete chewable, such as: McCooks Complete chewables. Avoid McCook sours or gummies. They lack iron and other important nutrients and also have added sugar. Continue with a chewable vitamin or change to an adult complete multivitamin one month after surgery. Menstruating women can take a prenatal vitamin. Make sure it has at least 18 mg iron and 425-791 mcg folic acid Calcium Supplement:  
 
Start taking within one month after surgery. Look for:  
Calcium Citrate Plus D (1500 mg per day) Recommend: Citracal 
 
Avoid chocolate chewable calcium. Can use chewable bariatric or [employer] brand or similar chewable. The body cannot absorb more than 500-600 mg of calcium at one time. Take for Life Vitamin D Take 3,000 international units daily Vitamin B12 B Complex Vitamin Start taking both within one month after surgery. Vitamin B12 (sublingual): Take 1000 mcg of Vitamin B12 three times weekly Must take sublingually (meaning you put it under your tongue) or in a liquid drop form for easy absorption. B Complex Vitamin:  
Take one pill daily or liquid drop form daily; as directed on bottle. Take for Life As you prepare to start the medical weight loss program: 12. Get your labs if ordered. We will call you with any abnormal results. 13. Start your day with a high protein breakfast. 
14. Increase your protein to over 80 grams daily. You can track yourself or use an sherice like MyFitnessPal, LoseIt, CalorieKing or Baritastic. 15. Start decreasing your carbs - aiming for less than 40 grams daily. 16. Increase your activity as able, to a minimum of 30 minutes of aerobic activity daily. 17. Drink a minimum of 64 oz of fluids each day. 18. Practice good sleep hygiene. Get a minimum of 7-9 hours of uninterrupted sleep each night. 19. Read \"The [employer]rand Diet Solution\". 20.

## 2021-01-29 ENCOUNTER — TELEPHONE (OUTPATIENT)
Dept: SURGERY | Age: 31
End: 2021-01-29

## 2021-01-29 DIAGNOSIS — E66.01 MORBID OBESITY WITH BMI OF 40.0-44.9, ADULT (HCC): Primary | ICD-10-CM

## 2021-01-29 DIAGNOSIS — E66.01 MORBID OBESITY (HCC): ICD-10-CM

## 2021-01-29 DIAGNOSIS — D50.8 IRON DEFICIENCY ANEMIA SECONDARY TO INADEQUATE DIETARY IRON INTAKE: Primary | ICD-10-CM

## 2021-01-29 DIAGNOSIS — E55.9 HYPOVITAMINOSIS D: ICD-10-CM

## 2021-01-29 RX ORDER — ERGOCALCIFEROL 1.25 MG/1
50000 CAPSULE ORAL
Qty: 26 CAP | Refills: 0 | Status: SHIPPED | OUTPATIENT
Start: 2021-01-29 | End: 2021-08-16

## 2021-01-29 RX ORDER — TOPIRAMATE 50 MG/1
50 TABLET, FILM COATED ORAL DAILY
Qty: 21 TAB | Refills: 0 | Status: SHIPPED | OUTPATIENT
Start: 2021-01-29 | End: 2021-03-15 | Stop reason: CLARIF

## 2021-01-29 NOTE — TELEPHONE ENCOUNTER
I spoke with patient and discussed possible side effects of phentermine. We reviewed labs. Her liver enzymes are elevated. She is going to print off a copy of her labs from [address] and with give them to her PCP for further workup. Vit D is low, Rx sent to pharmacy. Iron and Ferritin are low, sent referral to [facility] in [facility]. Patient is to contact our office with any problems, worsening of condition, questions or concerns. If we are not available or unable to be reached, patient is to proceed to the Emergency Department.

## 2021-01-29 NOTE — TELEPHONE ENCOUNTER
LVM for patient letting her know that I was sending her medication to her pharmacy as we had discussed in person yesterday.

## 2021-02-01 LAB — VIT B1 BLD-SCNC: 107.3 NMOL/L (ref 66.5–200)

## 2021-02-17 ENCOUNTER — HOSPITAL ENCOUNTER (OUTPATIENT)
Age: 31
Setting detail: OUTPATIENT SURGERY
Discharge: HOME | End: 2021-02-17
Attending: SPECIALIST | Admitting: SPECIALIST
Payer: COMMERCIAL

## 2021-02-17 ENCOUNTER — APPOINTMENT (OUTPATIENT)
Dept: GENERAL RADIOLOGY | Age: 31
End: 2021-02-17
Attending: SPECIALIST
Payer: COMMERCIAL

## 2021-02-17 VITALS
SYSTOLIC BLOOD PRESSURE: 136 MMHG | TEMPERATURE: 96.9 F | HEIGHT: 66 IN | OXYGEN SATURATION: 99 % | WEIGHT: 277.19 LBS | RESPIRATION RATE: 20 BRPM | BODY MASS INDEX: 44.55 KG/M2 | DIASTOLIC BLOOD PRESSURE: 82 MMHG | HEART RATE: 92 BPM

## 2021-02-17 DIAGNOSIS — E66.01 MORBID OBESITY (HCC): ICD-10-CM

## 2021-02-17 DIAGNOSIS — R13.10 DYSPHAGIA, UNSPECIFIED TYPE: ICD-10-CM

## 2021-02-17 PROCEDURE — 74240 X-RAY XM UPR GI TRC 1CNTRST: CPT

## 2021-02-17 PROCEDURE — 74240 X-RAY XM UPR GI TRC 1CNTRST: CPT | Performed by: SPECIALIST

## 2021-02-17 PROCEDURE — 76040000019: Performed by: SPECIALIST

## 2021-02-17 PROCEDURE — 74011000255 HC RX REV CODE- 255: Performed by: SPECIALIST

## 2021-02-18 NOTE — OP NOTES
[facility] [place]  OPERATIVE REPORT    Name:  Nalini Fajardo  MR#:   [id number]  :  1990  ACCOUNT #:  [de-identified]  DATE OF SERVICE:  2021    PREOPERATIVE DIAGNOSIS:  The patient is status post gastric bypass procedure in the past for assessment for possible revision surgery. POSTOPERATIVE DIAGNOSIS:  The patient is status post gastric bypass procedure in the past for assessment for possible revision surgery. PROCEDURE PERFORMED:  Upper GI study with barium. SURGEON:  Jearldine Dandy, MD    ASSISTANT:  None. ANESTHESIA:  None. COMPLICATIONS:  None. SPECIMENS REMOVED:  None. IMPLANTS:  None. ESTIMATED BLOOD LOSS:  None. STATEMENT OF MEDICAL NECESSITY:  The patient is a 71-year-old female, who several years ago underwent a revision of gastric bypass procedure with dilated gastric pouch. We performed that procedure for her. It was exceptionally difficult and she was left with small gastric pouch and a much longer Maria Luz limb of 150 cm. Unfortunately, the patient was seen postoperatively, was progressing nicely with her weight loss, but then in the last fall she moved out of the state. She had multiple stressors related to her mood and then subsequently got pregnant, which also caused her not to lose any appropriate amount of weight. She is now only down about 20 pounds from her preoperative weight, and she is here today for upper GI study to assess her gastric pouch. Of note, is the fact that she does experience some restriction at the level of the pouch. PROCEDURE:  The patient was brought to the fluoro unit where she was given thin barium. On swallowing the barium, she was noted to have normal peristalsis of her esophagus. She had filling in the distal esophagus with tapering into and through the gastroesophageal junction.   Contrast then filled a tiny gastric pouch, which was normal in caliber and dimension, it was at least the same or smaller than adjacent vertebral body. Contrast then flowed in the Maria Luz limb in a normal fashion. There was no obstruction at the level of the pouch and her pouch was appropriate size and not amenable to any type of revision. The patient has inquired today whether or not she might be candidate for a Lap-Band placement. I explained to her that she had an extensive operation, certainly I would like to make no guarantees as to whether or not we would even be able to place a band in a situation such as hers, but she will undergo medical weight loss program first and we will discuss this at a later date.       Mariel Hinojosa MD      [id number]  D:  02/17/2021 18:30  T:  02/17/2021 23:01  JOB #:  [id number]

## 2021-02-19 NOTE — PROCEDURES
Pt with complex surgical history. Seeking revision. UGI shows a small pouch. She is not a candidate for revision but may be a candidate for band over bypass. Given her surgical history she will attempt medical weight loss prior to possible banding.   See dictation

## 2021-02-25 ENCOUNTER — TELEPHONE (OUTPATIENT)
Dept: SURGERY | Age: 31
End: 2021-02-25

## 2021-02-25 NOTE — TELEPHONE ENCOUNTER
I called patient to discuss her weight loss medication that she started last month. She was not tolerating the medication so she d/c. I have asked her to f/u with her PCP regarding further appetite suppressants. She asked if I know of a PCP in [place] and I do not. She does have an EGD scheduled with Dr. Linda Jimenez and an upcoming visit with the dietitian. She will resume annual follow up visits from this point forward. Patient is to contact our office with any problems, worsening of condition, questions or concerns. If we are not available or unable to be reached, patient is to proceed to the Emergency Department.

## 2021-03-16 ENCOUNTER — HOSPITAL ENCOUNTER (OUTPATIENT)
Dept: PREADMISSION TESTING | Age: 31
Discharge: HOME | End: 2021-03-16
Payer: COMMERCIAL

## 2021-03-16 PROCEDURE — U0003 INFECTIOUS AGENT DETECTION BY NUCLEIC ACID (DNA OR RNA); SEVERE ACUTE RESPIRATORY SYNDROME CORONAVIRUS 2 (SARS-COV-2) (CORONAVIRUS DISEASE [COVID-19]), AMPLIFIED PROBE TECHNIQUE, MAKING USE OF HIGH THROUGHPUT TECHNOLOGIES AS DESCRIBED BY CMS-2020-01-R: HCPCS

## 2021-03-17 LAB — SARS-COV-2, NAA: NOT DETECTED

## 2021-03-23 ENCOUNTER — HOSPITAL ENCOUNTER (OUTPATIENT)
Age: 31
Setting detail: OUTPATIENT SURGERY
Discharge: HOME | End: 2021-03-23
Attending: SPECIALIST | Admitting: SPECIALIST
Payer: COMMERCIAL

## 2021-03-23 ENCOUNTER — ANESTHESIA (OUTPATIENT)
Dept: SURGERY | Age: 31
End: 2021-03-23
Payer: COMMERCIAL

## 2021-03-23 ENCOUNTER — ANESTHESIA EVENT (OUTPATIENT)
Dept: SURGERY | Age: 31
End: 2021-03-23
Payer: COMMERCIAL

## 2021-03-23 ENCOUNTER — APPOINTMENT (OUTPATIENT)
Dept: SURGERY | Age: 31
End: 2021-03-23

## 2021-03-23 VITALS
WEIGHT: 277.1 LBS | SYSTOLIC BLOOD PRESSURE: 110 MMHG | TEMPERATURE: 97.9 F | OXYGEN SATURATION: 100 % | DIASTOLIC BLOOD PRESSURE: 55 MMHG | BODY MASS INDEX: 44.53 KG/M2 | RESPIRATION RATE: 16 BRPM | HEART RATE: 85 BPM | HEIGHT: 66 IN

## 2021-03-23 DIAGNOSIS — R10.13 EPIGASTRIC PAIN: ICD-10-CM

## 2021-03-23 LAB — HCG UR QL: NEGATIVE

## 2021-03-23 PROCEDURE — 76010000154 HC OR TIME FIRST 0.5 HR: Performed by: SPECIALIST

## 2021-03-23 PROCEDURE — 77030013079 HC BLNKT BAIR HGGR 3M -A: Performed by: ANESTHESIOLOGY

## 2021-03-23 PROCEDURE — 77030031670 HC DEV INFL ENDOTEK BIG60 MRTM -B: Performed by: SPECIALIST

## 2021-03-23 PROCEDURE — 77030020782 HC GWN BAIR PAWS FLX 3M -B: Performed by: SPECIALIST

## 2021-03-23 PROCEDURE — 43239 EGD BIOPSY SINGLE/MULTIPLE: CPT | Performed by: SPECIALIST

## 2021-03-23 PROCEDURE — 74011250636 HC RX REV CODE- 250/636: Performed by: ANESTHESIOLOGY

## 2021-03-23 PROCEDURE — 74011250636 HC RX REV CODE- 250/636: Performed by: SPECIALIST

## 2021-03-23 PROCEDURE — 77030039961 HC KT CUST COLON BSC -D: Performed by: SPECIALIST

## 2021-03-23 PROCEDURE — 74011000250 HC RX REV CODE- 250: Performed by: ANESTHESIOLOGY

## 2021-03-23 PROCEDURE — 77030040361 HC SLV COMPR DVT MDII -B: Performed by: SPECIALIST

## 2021-03-23 PROCEDURE — 88305 TISSUE EXAM BY PATHOLOGIST: CPT

## 2021-03-23 PROCEDURE — 76210000020 HC REC RM PH II FIRST 0.5 HR: Performed by: SPECIALIST

## 2021-03-23 PROCEDURE — 81025 URINE PREGNANCY TEST: CPT

## 2021-03-23 PROCEDURE — 76060000031 HC ANESTHESIA FIRST 0.5 HR: Performed by: SPECIALIST

## 2021-03-23 PROCEDURE — 2709999900 HC NON-CHARGEABLE SUPPLY: Performed by: SPECIALIST

## 2021-03-23 RX ORDER — SODIUM CHLORIDE, SODIUM LACTATE, POTASSIUM CHLORIDE, CALCIUM CHLORIDE 600; 310; 30; 20 MG/100ML; MG/100ML; MG/100ML; MG/100ML
125 INJECTION, SOLUTION INTRAVENOUS CONTINUOUS
Status: DISCONTINUED | OUTPATIENT
Start: 2021-03-23 | End: 2021-03-23 | Stop reason: HOSPADM

## 2021-03-23 RX ORDER — DIPHENHYDRAMINE HYDROCHLORIDE 50 MG/ML
12.5 INJECTION, SOLUTION INTRAMUSCULAR; INTRAVENOUS
Status: CANCELLED | OUTPATIENT
Start: 2021-03-23

## 2021-03-23 RX ORDER — FLUMAZENIL 0.1 MG/ML
0.2 INJECTION INTRAVENOUS
Status: CANCELLED | OUTPATIENT
Start: 2021-03-23

## 2021-03-23 RX ORDER — ONDANSETRON 2 MG/ML
4 INJECTION INTRAMUSCULAR; INTRAVENOUS ONCE
Status: CANCELLED | OUTPATIENT
Start: 2021-03-23 | End: 2021-03-23

## 2021-03-23 RX ORDER — ALBUTEROL SULFATE 0.83 MG/ML
2.5 SOLUTION RESPIRATORY (INHALATION)
Status: CANCELLED | OUTPATIENT
Start: 2021-03-23 | End: 2021-03-24

## 2021-03-23 RX ORDER — PROPOFOL 10 MG/ML
INJECTION, EMULSION INTRAVENOUS AS NEEDED
Status: DISCONTINUED | OUTPATIENT
Start: 2021-03-23 | End: 2021-03-23 | Stop reason: HOSPADM

## 2021-03-23 RX ORDER — LIDOCAINE HYDROCHLORIDE 20 MG/ML
INJECTION, SOLUTION EPIDURAL; INFILTRATION; INTRACAUDAL; PERINEURAL AS NEEDED
Status: DISCONTINUED | OUTPATIENT
Start: 2021-03-23 | End: 2021-03-23 | Stop reason: HOSPADM

## 2021-03-23 RX ORDER — SODIUM CHLORIDE, SODIUM LACTATE, POTASSIUM CHLORIDE, CALCIUM CHLORIDE 600; 310; 30; 20 MG/100ML; MG/100ML; MG/100ML; MG/100ML
25 INJECTION, SOLUTION INTRAVENOUS CONTINUOUS
Status: CANCELLED | OUTPATIENT
Start: 2021-03-23

## 2021-03-23 RX ORDER — PHENTERMINE HYDROCHLORIDE 8 MG/1
8 TABLET ORAL AS NEEDED
COMMUNITY
Start: 2021-02-01 | End: 2021-08-16

## 2021-03-23 RX ORDER — CETIRIZINE HCL 10 MG
TABLET ORAL
COMMUNITY
End: 2021-08-16

## 2021-03-23 RX ORDER — TOPIRAMATE 50 MG/1
50 TABLET, FILM COATED ORAL AS NEEDED
COMMUNITY
Start: 2021-01-29 | End: 2021-08-16

## 2021-03-23 RX ORDER — ACETAMINOPHEN 325 MG/1
650 TABLET ORAL
COMMUNITY

## 2021-03-23 RX ADMIN — LIDOCAINE HYDROCHLORIDE 60 MG: 20 INJECTION, SOLUTION EPIDURAL; INFILTRATION; INTRACAUDAL; PERINEURAL at 14:20

## 2021-03-23 RX ADMIN — PROPOFOL 50 MG: 10 INJECTION, EMULSION INTRAVENOUS at 14:24

## 2021-03-23 RX ADMIN — SODIUM CHLORIDE, SODIUM LACTATE, POTASSIUM CHLORIDE, AND CALCIUM CHLORIDE 125 ML/HR: 600; 310; 30; 20 INJECTION, SOLUTION INTRAVENOUS at 11:30

## 2021-03-23 RX ADMIN — SODIUM CHLORIDE, SODIUM LACTATE, POTASSIUM CHLORIDE, AND CALCIUM CHLORIDE: 600; 310; 30; 20 INJECTION, SOLUTION INTRAVENOUS at 14:15

## 2021-03-23 RX ADMIN — PROPOFOL 100 MG: 10 INJECTION, EMULSION INTRAVENOUS at 14:20

## 2021-03-23 RX ADMIN — PROPOFOL 50 MG: 10 INJECTION, EMULSION INTRAVENOUS at 14:22

## 2021-03-23 NOTE — ROUTINE PROCESS
Reviewed PTA medication list with patient/caregiver and patient/caregiver denies any additional medications. Patient admits to having a responsible adult care for them at home for at least 24 hours after surgery. Patient encouraged to use gown warming system and informed that using said warming gown to regulate body temperature prior to a procedure has been shown to help reduce the risks of blood clots and infection. Patient's pharmacy of choice verified and documented in PTA medication section. Dual skin assessment & fall risk band verification completed with KIM Islas.

## 2021-03-23 NOTE — DISCHARGE INSTRUCTIONS
[facility] (formerly [facility]) Patient Education        9 Things To Do If You've Been Exposed to COVID-19    Stay home. If you've been exposed, you should stay in quarantine for at least 14 days. Ask your doctor when it's safe to end your quarantine. Don't go to school, work, or public areas. And don't use public transportation, ride-shares, or taxis unless you have no choice. Leave your home only if you need to get medical care. But call the doctor's office first so they know you're coming, and wear a cloth face cover when you go. Call your doctor. Call your doctor or other health professional to let them know that you've been exposed. They might want you to be tested, or they may have other instructions for you. If you become sick, wear a face cover when you are around other people. It can help stop the spread of the virus when you cough or sneeze. Limit contact with people in your home. If possible, stay in a separate bedroom and use a separate bathroom. Avoid contact with pets and other animals. Cover your mouth and nose with a tissue when you cough or sneeze. Then throw it in the trash right away. Wash your hands often, especially after you cough or sneeze. Use soap and water, and scrub for at least 20 seconds. If soap and water aren't available, use an alcohol-based hand . Don't share personal household items. These include bedding, towels, cups and glasses, and eating utensils. Clean and disinfect your home every day. Use household  or disinfectant wipes or sprays. Take special care to clean things that you grab with your hands. These include doorknobs, remote controls, phones, and handles on your refrigerator and microwave. And don't forget countertops, tabletops, bathrooms, and computer keyboards. Current as of: December 18, 2020               Content Version: 12.7  © 2006-2021 [employer], [employer].    Care instructions adapted under license by [facility] (which disclaims liability or warranty for this information). If you have questions about a medical condition or this instruction, always ask your healthcare professional. [place] [address] any warranty or liability for your use of this information. DISCHARGE SUMMARY from Nurse    PATIENT INSTRUCTIONS:    After general anesthesia or intravenous sedation, for 24 hours or while taking prescription Narcotics:  · Limit your activities  · Do not drive and operate hazardous machinery  · Do not make important personal or business decisions  · Do  not drink alcoholic beverages  · If you have not urinated within 8 hours after discharge, please contact your surgeon on call. Report the following to your surgeon:  · Excessive pain, swelling, redness or odor of or around the surgical area  · Temperature over 100.5  · Nausea and vomiting lasting longer than 4 hours or if unable to take medications  · Any signs of decreased circulation or nerve impairment to extremity: change in color, persistent  numbness, tingling, coldness or increase pain  · Any questions    What to do at Home:  [address]    If you experience any of the following symptoms heavy bleeding, difficulty swallowing, fevers, severe pain, please follow up with dr Jaramlilo Cea    *  Please give a list of your current medications to your Primary Care Provider. *  Please update this list whenever your medications are discontinued, doses are      changed, or new medications (including over-the-counter products) are added. *  Please carry medication information at all times in case of emergency situations. These are general instructions for a healthy lifestyle:    No smoking/ No tobacco products/ Avoid exposure to second hand smoke  Surgeon General's Warning:  Quitting smoking now greatly reduces serious risk to your health.     Obesity, smoking, and sedentary lifestyle greatly increases your risk for illness    A healthy diet, regular physical exercise & weight monitoring are important for maintaining a healthy lifestyle    You may be retaining fluid if you have a history of heart failure or if you experience any of the following symptoms:  Weight gain of 3 pounds or more overnight or 5 pounds in a week, increased swelling in our hands or feet or shortness of breath while lying flat in bed. Please call your doctor as soon as you notice any of these symptoms; do not wait until your next office visit. The discharge information has been reviewed with the patient and spouse. The patient and spouse verbalized understanding. Discharge medications reviewed with the patient and spouse and appropriate educational materials and side effects teaching were provided. ___________________________________________________________________________________________________________________________________  [id number]  1990    EGD DISCHARGE INSTRUCTIONS    Discomfort:  Sore throat- throat lozenges or warm salt water gargle  redness at IV site- apply warm compress to area; if redness or soreness persist- contact your physician  Gaseous discomfort- walking, belching will help relieve any discomfort  You should not operate a vehicle for 12 hours  You should note engage in an occupation involving machinery or appliances for rest of today  You may note drink alcoholic beverages for at least 12 hours  Avoid making any critical decisions for at least 24 hour  DIET  You may resume your regular diet - however -  remember your colon is empty and a heavy meal will produce gas. Avoid these foods:  vegetables, fried / greasy foods, carbonated drinks    ACTIVITY  You may resume your normal daily activities   Spend the remainder of the day resting -  avoid any strenuous activity. CALL M.D.   ANY SIGN OF   Increasing pain, nausea, vomiting  Abdominal distension (swelling)  New increased bleeding (oral or rectal)  Fever (chills)  Pain in chest area  Bloody discharge from nose or mouth  Shortness of breath       Follow-up Instructions:   Dr Nj Casillas will call you in one to two weeks. If you do not hear from him, please call his office [phone]. Prabhjot Zimmer  [id number]  1990        DISCHARGE SUMMARY from Nurse    The following personal items collected during your admission are returned to you:   Dental Appliance: Dental Appliances: None  Vision: Visual Aid: None  Hearing Aid:    Jewelry: Jewelry: None  Clothing: Clothing: Shirt, Socks, Footwear, Pants, Undergarments(LOCKER#4)  Other Valuables:  Other Valuables: Cell Phone(cell in safe )  Valuables sent to safe:      PATIENT INSTRUCTIONS:    Take Home Medications:    Patient armband removed and shredded

## 2021-03-23 NOTE — INTERVAL H&P NOTE
Update History & Physical 
 
The Patient's History and Physical of March 23, 2021 was reviewed with the patient and I examined the patient. There was no change. The surgical site was confirmed by the patient and me. Plan:  The risk, benefits, expected outcome, and alternative to the recommended procedure have been discussed with the patient. Patient understands and wants to proceed with the procedure.  
 
Electronically signed by Felicia Sandhu MD on 3/23/2021 at 2:10 PM

## 2021-03-23 NOTE — ANESTHESIA POSTPROCEDURE EVALUATION
Post-Anesthesia Evaluation & Assessment    Visit Vitals  BP (!) 110/55   Pulse 85   Temp 36.6 °C (97.9 °F)   Resp 16   Ht 5' 6\" (1.676 m)   Wt 125.7 kg (277 lb 1.6 oz)   SpO2 100%   BMI 44.73 kg/m²       Nausea/Vomiting: no nausea and no vomiting    Post-operative hydration adequate. Pain score (VAS): 0    Mental status & Level of consciousness: alert and oriented x 3    Neurological status: moves all extremities, sensation grossly intact    Pulmonary status: airway patent, no supplemental oxygen required    Complications related to anesthesia: none    Patient has met all discharge requirements. Additional comments:  Procedure(s):  EGD WITH POSSIBLE BIOPSY AND POSSIBLE DILITATION.     MAC    <BSHSIANPOST>    INITIAL Post-op Vital signs:   Vitals Value Taken Time   /55 03/23/21 1500   Temp 36.6 °C (97.9 °F) 03/23/21 1440   Pulse 85 03/23/21 1500   Resp 16 03/23/21 1500   SpO2 100 % 03/23/21 1500

## 2021-03-23 NOTE — PERIOP NOTES
Updated patient that Dr. Kizzy Mcclure still in case prior to her case and he will see her prior to OR

## 2021-03-23 NOTE — ANESTHESIA PREPROCEDURE EVALUATION
Relevant Problems   NEUROLOGY   (+) Depression   (+) Psychotic disorder (HCC)      ENDOCRINE   (+) Morbid obesity (HCC)   (+) Morbid obesity due to excess calories (HCC)      HEMATOLOGY   (+) Iron deficiency anemia secondary to inadequate dietary iron intake       Anesthetic History   No history of anesthetic complications            Review of Systems / Medical History  Patient summary reviewed, nursing notes reviewed and pertinent labs reviewed    Pulmonary  Within defined limits                 Neuro/Psych         Psychiatric history     Cardiovascular                  Exercise tolerance: >4 METS     GI/Hepatic/Renal  Within defined limits              Endo/Other        Morbid obesity     Other Findings              Physical Exam    Airway  Mallampati: III  TM Distance: 4 - 6 cm  Neck ROM: normal range of motion   Mouth opening: Normal     Cardiovascular  Regular rate and rhythm,  S1 and S2 normal,  no murmur, click, rub, or gallop             Dental  No notable dental hx       Pulmonary  Breath sounds clear to auscultation               Abdominal  GI exam deferred       Other Findings            Anesthetic Plan    ASA: 3  Anesthesia type: MAC          Induction: Intravenous  Anesthetic plan and risks discussed with: Patient

## 2021-03-23 NOTE — PROCEDURES
Esophagogastroduodenoscopy Procedure Note    Indications: 35 y.o. obese female who is 3 years post revision of gastric bypass which had been performed [facility] approx 14 years earlier. She is having some dysphagia issues that aren't rationalized on recent UGI. Dietary adjustments and conservative measures have not relieved her symptoms    Anesthesia/Sedation: MAC anesthesia    Pre-Procedure Exam:  Airway: clear   Heart: normal S1and S2    Lungs: clear bilateral  Abdomen: soft, nontender, bowel sounds present and normal in all quadrants   Mental Status: awake, alert, and oriented to person, place, and time      Procedure in Detail:  Informed consent was obtained for the procedure, including conscious sedation. Risks of pancreatitis, infection, perforation, hemorrhage, adverse drug reaction, and aspiration were discussed. The patient was placed in the left lateral decubitus position. Based on the pre-procedure assessment, including review of the patient's medical history, medications, allergies, and review of systems, he had been deemed to be an appropriate candidate for moderate sedation; he was therefore sedated with the medications listed above. He was monitored continuously with electrocardiogram tracing, pulse oximetry, blood pressure monitoring, and direct observation. The [id number] gastroscope was inserted into the mouth and advanced under direct vision to proximal alethea limb. A careful inspection was made as the gastroscope was withdrawn, including a retroflexed view of the proximal stomach; findings and interventions are described below. Appropriate photodocumentation was obtained.     Findings:   Oropharynx - normal mucosa without abnormalities  Esophagus - normal mucosa without webs, rings, or strictures, no esophagitis or ulcers  Gastric pouch - normal size and configuration with normal mucosa without evidence of ulcers   Anastomosis - normal diameter for timeframe without ulcers or other anatomical abnormalities  Maria Luz limb - normal mucosa without ulcers or obstruction. - normal anatomy, continue care. Pt could be a candidate for band over bypass in the future if desires    Therapies:    biopsy of stomach pouch    Specimens: Specimens were collected and sent to pathology. Complications:   None; patient tolerated the procedure well. EBL:  None           Attending Attestation:  I performed the procedure. Recommendations:  - Await pathology. Signed by:  Abad Genao MD

## 2021-03-23 NOTE — H&P
EGD - History and Physical    Subjective: The patient is a 35 y.o. obese female who is 3 years post revision of gastric bypass which had been performed at [facility] approx 14 years earlier. She is having some dysphagia issues that aren't rationalized on recent UGI. Dietary adjustments and conservative measures have not relieved her symptoms. She understands the need for EGD.     Patient Active Problem List    Diagnosis Date Noted    SBO (small bowel obstruction) ([address] 2018    Abdominal pain 2018    Psychotic disorder ([address]     Dilation of stomach     Morbid obesity with BMI of 40.0-44.9, adult ([address]     Morbid obesity ([address]     Depression     Borderline diabetes     Intestinal malabsorption     Iron deficiency anemia secondary to inadequate dietary iron intake 2016    B12 deficiency 2016    Morbid obesity due to excess calories ([address] 2016    H/O gastric bypass 2016      Past Surgical History:   Procedure Laterality Date    HX  SECTION  2020    HX GASTRIC BYPASS      HX GASTRIC BYPASS  2016    Revision    HX GI      gastric bypass-    HX OTHER SURGICAL      egd      Social History     Tobacco Use    Smoking status: Never Smoker    Smokeless tobacco: Never Used   Substance Use Topics    Alcohol use: No     Alcohol/week: 0.0 standard drinks      Family History   Problem Relation Age of Onset    No Known Problems Mother     No Known Problems Father     No Known Problems Sister     No Known Problems Brother     No Known Problems Maternal Aunt     No Known Problems Maternal Uncle     No Known Problems Paternal Aunt     No Known Problems Paternal Uncle     No Known Problems Maternal Grandmother     No Known Problems Maternal Grandfather     No Known Problems Paternal Grandmother     No Known Problems Paternal Grandfather     No Known Problems Other       Current Facility-Administered Medications   Medication Dose Route Frequency Provider Last Rate Last Admin    lactated Ringers infusion  125 mL/hr IntraVENous CONTINUOUS Tarah Green  mL/hr at 03/23/21 1130 125 mL/hr at 03/23/21 1130     Allergies   Allergen Reactions    Morphine Swelling          Review of Systems:        General - No history or complaints of unexpected fever, chills, or weight loss  Head/Neck - No history or complaints of headache, diplopia, dysphagia, hearing loss  Cardiac - No history or complaints of chest pain, palpitations, murmur, or shortness of breath  Pulmonary - No history or complaints of shortness of breath, productive cough, hemoptysis  Gastrointestinal - No history or complaints of reflux,  abdominal pain, obstipation/constipation, blood per rectum  Genitourinary - No history or complaints of hematuria/dysuria, stress urinary incontinence symptoms, or renal lithiasis  Musculoskeletal - No history or complaints of joint pain or muscular weakness  Hematologic - No history or complaints of bleeding disorders, blood transfusions, sickle cell anemia  Neurologic - No history or complaints of  migraine headaches, seizure activity, syncopal episodes, TIA or stroke  Integumentary - No history or complaints of rashes, abnormal nevi, skin cancer  Gynecological - unremarkable    Objective:     Visit Vitals  /79 (BP 1 Location: Left arm, BP Patient Position: At rest;Sitting)   Pulse 99   Temp 98.8 °F (37.1 °C)   Resp 20   Ht 5' 6\" (1.676 m)   Wt 125.7 kg (277 lb 1.6 oz)   LMP 02/21/2021 (Approximate)   SpO2 99%   BMI 44.73 kg/m²         Physical Examination: General appearance - alert, well appearing, and in no distress and oriented to person, place, and time  Mental status - alert, oriented to person, place, and time, normal mood, behavior, speech, dress, motor activity, and thought processes  Eyes - pupils equal and reactive, extraocular eye movements intact, sclera anicteric, left eye normal, right eye normal  Ears - right ear normal, left ear normal  Nose - normal and patent, no erythema, discharge or polyps  Mouth - mucous membranes moist, pharynx normal without lesions  Neck - supple, no significant adenopathy  Lymphatics - no palpable lymphadenopathy, no hepatosplenomegaly  Chest - clear to auscultation, no wheezes, rales or rhonchi, symmetric air entry  Heart - normal rate, regular rhythm, normal S1, S2, no murmurs, rubs, clicks or gallops  Abdomen - soft, nontender, nondistended, no masses or organomegaly  Back exam - full range of motion, no tenderness, palpable spasm or pain on motion  Neurological - alert, oriented, normal speech, no focal findings or movement disorder noted  Musculoskeletal - no joint tenderness, deformity or swelling  Extremities - peripheral pulses normal, no pedal edema, no clubbing or cyanosis  Skin - normal coloration and turgor, no rashes, no suspicious skin lesions noted    Labs / Preoperative Evaluations:     Recent Results (from the past 1008 hour(s))   SARS-COV-2 BY KRISSY    Collection Time: 03/16/21 11:00 AM   Result Value Ref Range    SARS-CoV-2, KRISSY Not detected NOTD     HCG URINE, QL. - POC    Collection Time: 03/23/21 11:16 AM   Result Value Ref Range    Pregnancy test,urine (POC) Negative NEG         Assessment:     Dysphagia in complex surgical patient     Plan:     EGD    Plan for EGD. She understands the risks, benefits and alternatives of the EGD. She understands the risk include but are not limited to; death, DVT/PE, damage to surrounding structures, perforation of the GI tract, equipment malfunction, bleeding, and infection. She understands all of the above and wishes to proceed with EGD.         Signed By: LEILA Nguyen     March 23, 2021

## 2021-03-30 ENCOUNTER — OFFICE VISIT (OUTPATIENT)
Dept: SURGERY | Age: 31
End: 2021-03-30

## 2021-03-30 DIAGNOSIS — E66.01 MORBID OBESITY (HCC): Primary | ICD-10-CM

## 2021-04-01 VITALS — BODY MASS INDEX: 45.62 KG/M2 | WEIGHT: 283.9 LBS | HEIGHT: 66 IN

## 2021-04-01 NOTE — PROGRESS NOTES
Medical Weight Loss Multi-Disciplinary Program    Name: Camden Mobley   : 1990    Session# 1 - Pt attended in-person nutrition education class. Date: 2021    Visit Vitals  Ht 5' 6\" (1.676 m)   Wt 128.8 kg (283 lb 14.4 oz)   BMI 45.82 kg/m²    In-office weight. Dietary Instructions    Reviewed intake  Understanding low carbohydrates, low sugar, higher protein meals  Instruction given for personal dietary changes  Discussed perceived compliance  Comments: RD Reviewed Diet History and Physical Activity/Exercise habits. Recommended dietary changes discussed for both before and after surgery. Reviewed recommendation to follow [id number] calorie diet, working to reduce total carbohydrate intake to  g or less per day and increasing protein intake to  g per day, compared current intake to recommendations. Recommend pt adopt an exercise routine of at least 3-5 days a week for at least 30 minutes/day. If pt unable to participate in walking, haydee, swimming, or other exercises, recommend pt work with a physical therapist and/or participate in chair exercises, yoga, and/or increase activities of daily living as able. Patient participated in pre-recorded education class video. Recorded video of dietitian discussing key diet principles following weight loss surgery, importance of high protein diet, sources of protein, tips for following low carbohydrate diet, and ways to measure carbohydrates utilizing carbohydrate counting. Discussed importance of sampling protein supplements, protein supplement label guidelines and popularly selected items. Also reviewed the key vitamins and minerals to supplement long term after surgery. But these vitamins will be reviewed again in a pre-operative class. Patient watched the video in its entirety and turned in the 3 embedded passcodes from the video session.       Behavior Modification    Identify obstacles to trigger change  Achieving/Rewarding goals met  Positive attitude  Comments: Reinforced importance continuing to modify lifestyle patterns and behaviors to promote weight loss and long term weight maintenance. I talked to patient about the importance of taking vitamins post op and we reviewed the vitamins that patients will be taking post op. Patient will hear this again at pre op class before surgery. Patient had the opportunity to ask questions about these vitamins that will be lifelong. Comments:  Pt set personal behavior goals as related to pre- and post-surgical recommendations and diet key principles. Recommend pt track progress and set SMART goals around post-op diet key principles. Pt completed Bariatric-specific nutrition questionnaire. RD reviewed answers and provided feedback on responses that align with bariatric recommendations to behavior changes. Provided feedback on recommendations, areas for improvement, and provided positive feedback on areas where pt is making positive behavior changes. Pt questionnaire is included in chart separate from this note. Goals:   1. Work to increase to 3-4 small meals per day, with planned snacks as needed. Recommend following plate method for meal planning - focusing on lean protein, non-starchy vegetables, and measured amounts of starch. - Goal of  g protein and  g carbohydrate per day. - Recommend continuing protein supplement as meal replacement at least 1x/day OR as high protein snack option  2. Increase non caloric fluid to 64 oz per day. Eliminate caffeine, added sugar, carbonation, and straws.               -Continue to work to decrease sugar sweetened beverages - goal of calorie free beverages only              -Must eliminate caffeine prior to surgery and avoid for ~6-8 weeks   -Practice 30:30 rule,  food and flood   3. Start activity regimen, work to increase ADL  4. Start Complete MVI    Candidate for surgery (per RD):  PENDING

## 2021-04-29 ENCOUNTER — OFFICE VISIT (OUTPATIENT)
Dept: SURGERY | Age: 31
End: 2021-04-29

## 2021-04-29 DIAGNOSIS — E66.01 MORBID OBESITY (HCC): Primary | ICD-10-CM

## 2021-04-30 ENCOUNTER — OFFICE VISIT (OUTPATIENT)
Dept: SURGERY | Age: 31
End: 2021-04-30
Payer: COMMERCIAL

## 2021-04-30 VITALS
RESPIRATION RATE: 16 BRPM | HEIGHT: 66 IN | BODY MASS INDEX: 44.93 KG/M2 | WEIGHT: 279.56 LBS | DIASTOLIC BLOOD PRESSURE: 61 MMHG | OXYGEN SATURATION: 99 % | HEART RATE: 97 BPM | TEMPERATURE: 98.1 F | SYSTOLIC BLOOD PRESSURE: 125 MMHG

## 2021-04-30 DIAGNOSIS — Z98.84 H/O GASTRIC BYPASS: ICD-10-CM

## 2021-04-30 DIAGNOSIS — K90.9 INTESTINAL MALABSORPTION, UNSPECIFIED TYPE: ICD-10-CM

## 2021-04-30 DIAGNOSIS — Z98.84 STATUS POST GASTRIC BYPASS FOR OBESITY: ICD-10-CM

## 2021-04-30 DIAGNOSIS — F32.A DEPRESSION, UNSPECIFIED DEPRESSION TYPE: ICD-10-CM

## 2021-04-30 DIAGNOSIS — E66.01 MORBID OBESITY WITH BMI OF 40.0-44.9, ADULT (HCC): ICD-10-CM

## 2021-04-30 DIAGNOSIS — R73.03 BORDERLINE DIABETES: ICD-10-CM

## 2021-04-30 DIAGNOSIS — E66.01 MORBID OBESITY (HCC): Primary | ICD-10-CM

## 2021-04-30 DIAGNOSIS — D50.8 IRON DEFICIENCY ANEMIA SECONDARY TO INADEQUATE DIETARY IRON INTAKE: ICD-10-CM

## 2021-04-30 PROBLEM — R10.9 ABDOMINAL PAIN: Status: RESOLVED | Noted: 2018-05-31 | Resolved: 2021-04-30

## 2021-04-30 PROBLEM — K56.609 SBO (SMALL BOWEL OBSTRUCTION) (HCC): Status: RESOLVED | Noted: 2018-05-31 | Resolved: 2021-04-30

## 2021-04-30 PROCEDURE — 99214 OFFICE O/P EST MOD 30 MIN: CPT | Performed by: SPECIALIST

## 2021-04-30 NOTE — PATIENT INSTRUCTIONS

## 2021-04-30 NOTE — PROGRESS NOTES
Band over Bypass Surgery Consultation    Subjective: The patient is a 35 y.o. obese female with a Body mass index is 45.12 kg/m². she has been considering surgery since having a normal EGD last month. she desires surgery at this time because of multiple health concerns and their lifestyle issues which are hindered by their weight. she has been referred by his family physician for evaluation and treatment of their obesity via surgical intervention. Cheri Renee has tried multiple diets in her lifetime most recently tried physician supervised, behavior modification and unsupervised diets. She had her 2007 [facility] performed gastric bypass revised via this office 3 years ago. 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 The patient  now presents  for consideration for weight loss surgery understanding the benefits of this over a medical approach of weight loss as was discussed in our presentation on weight loss surgery. They have discussed their plans both with their family and primary care physician who is in support of their pursuit of such.  The patient has no had health issues as of late and denies and gastrointestinal disturbances other than what is outlined below in their review of symptoms. All of their prior evaluations available by both their PCP's and specialists physicians have been reviewed today either in the Care Everywhere portal or scanned under the media tab. I have spent a large portion of my initial consultation today reviewing the patients current dietary habits which have contributed to their health issues and obesity. I have suggested to them personally a dietary regimen that they can initiate now to help with their status as it pertains to their weight. They understand that the most important aspect of their journey through their weight loss endeavor will be their adherence to a new lifestyle of healthy eating behavior. They also understand that an adherence to an exercise program will not only help with weight loss but is ultimately important in weight maintenance. The patients goal weight is 168 lb. These goals are consistent with expected outcomes of their desired operation. her Medical goals are resolution of these health issues.       Patient Active Problem List    Diagnosis Date Noted    Status post gastric bypass for obesity     Anemia     SBO (small bowel obstruction) ([address] 2018    Abdominal pain 2018    Psychotic disorder ([address]     Dilation of stomach     Morbid obesity with BMI of 40.0-44.9, adult ([address]     Morbid obesity ([address]     Depression     Borderline diabetes     Intestinal malabsorption     Iron deficiency anemia secondary to inadequate dietary iron intake 2016    B12 deficiency 2016    Morbid obesity due to excess calories ([address] 2016    H/O gastric bypass 2016      Past Surgical History:   Procedure Laterality Date    HX  SECTION  2020    [facility]PASS      [facility]    HX GI  2018    revision of [facility] gastric bypass / [place]    HX TUBAL LIGATION        Social History     Tobacco Use    Smoking status: Never Smoker    Smokeless tobacco: Never Used   Substance Use Topics    Alcohol use: No      Family History   Problem Relation Age of Onset    No Known Problems Mother     No Known Problems Father     No Known Problems Sister     No Known Problems Brother     No Known Problems Maternal Aunt     No Known Problems Maternal Uncle     No Known Problems Paternal Aunt     No Known Problems Paternal Uncle     No Known Problems Maternal Grandmother     No Known Problems Maternal Grandfather     No Known Problems Paternal Grandmother     No Known Problems Paternal Grandfather     No Known Problems Other       Current Outpatient Medications   Medication Sig Dispense Refill    acetaminophen (TylenoL) 325 mg tablet Take  by mouth every four (4) hours as needed for Pain.  cetirizine (ZyrTEC) 10 mg tablet Take  by mouth.  ergocalciferol (Vitamin D2) 1,250 mcg (50,000 unit) capsule Take 1 Cap by mouth every seven (7) days for 52 doses. Indications: vitamin D deficiency (high dose therapy) 26 Cap 0    topiramate (TOPAMAX) 50 mg tablet Take 50 mg by mouth as needed.  Lomaira 8 mg tab Take 8 mg by mouth as needed.        Allergies   Allergen Reactions    Morphine Swelling          Review of Systems:        General - No history or complaints of unexpected fever, chills, or weight loss  Head/Neck - No history or complaints of headache, diplopia, dysphagia, hearing loss  Cardiac - No history or complaints of chest pain, palpitations, murmur, or shortness of breath  Pulmonary - No history or complaints of shortness of breath, productive cough, hemoptysis  Gastrointestinal - no reflux noted ,no  abdominal pain, obstipation/constipation or blood per rectum  Genitourinary - No history or complaints of hematuria/dysuria, stress urinary incontinence symptoms, or renal lithiasis  Musculoskeletal - mild joint pain in their knees,  no muscular weakness  Hematologic - No history or complaints of bleeding disorders,  No blood transfusions  Neurologic - No history or complaints of  migraine headaches, seizure activity, syncopal episodes, TIA or stroke  Integumentary - No history or complaints of rashes, abnormal nevi, skin cancer  Gynecological - unremarkable    Objective:     Visit Vitals  /61 (BP 1 Location: Left arm, BP Patient Position: Sitting, BP Cuff Size: Adult)   Pulse 97   Temp 98.1 °F (36.7 °C)   Resp 16   Ht 5' 6\" (1.676 m)   Wt 126.8 kg (279 lb 9 oz)   SpO2 99%   BMI 45.12 kg/m²       Physical Examination: General appearance - alert, well appearing, and in no distress  Mental status - alert, oriented to person, place, and time  Eyes - pupils equal and reactive, extraocular eye movements intact  Ears - bilateral TM's and external ear canals normal  Nose - normal and patent, no erythema, discharge or polyps  Mouth - mucous membranes moist, pharynx normal without lesions  Neck - supple, no significant adenopathy  Lymphatics - no palpable lymphadenopathy, no hepatosplenomegaly  Chest - clear to auscultation, no wheezes, rales or rhonchi, symmetric air entry  Heart - normal rate, regular rhythm, normal S1, S2, no murmurs, rubs, clicks or gallops  Abdomen - soft, nontender, nondistended, no masses or organomegaly  Back exam - full range of motion, no tenderness, palpable spasm or pain on motion  Neurological - alert, oriented, normal speech, no focal findings or movement disorder noted  Musculoskeletal - no joint tenderness, deformity or swelling  Extremities - peripheral pulses normal, no pedal edema, no clubbing or cyanosis  Skin - normal coloration and turgor, no rashes, no suspicious skin lesions noted    Labs:     Lab Results   Component Value Date/Time    WBC 7.8 01/28/2021 03:48 PM    HGB 11.9 (L) 01/28/2021 03:48 PM    HCT 37.6 01/28/2021 03:48 PM    PLATELET 210 42/27/1734 03:48 PM    MCV 78.3 01/28/2021 03:48 PM     Lab Results Component Value Date/Time    Sodium 141 01/28/2021 03:48 PM    Potassium 4.0 01/28/2021 03:48 PM    Chloride 109 01/28/2021 03:48 PM    CO2 29 01/28/2021 03:48 PM    Anion gap 3 01/28/2021 03:48 PM    Glucose 82 01/28/2021 03:48 PM    BUN 12 01/28/2021 03:48 PM    Creatinine 0.65 01/28/2021 03:48 PM    BUN/Creatinine ratio 18 01/28/2021 03:48 PM    GFR est AA >60 01/28/2021 03:48 PM    GFR est non-AA >60 01/28/2021 03:48 PM    Calcium 8.6 01/28/2021 03:48 PM    Bilirubin, total 0.3 01/28/2021 03:48 PM    Alk. phosphatase 141 (H) 01/28/2021 03:48 PM    Protein, total 7.9 01/28/2021 03:48 PM    Albumin 4.1 01/28/2021 03:48 PM    Globulin 3.8 01/28/2021 03:48 PM    A-G Ratio 1.1 01/28/2021 03:48 PM    ALT (SGPT) 137 (H) 01/28/2021 03:48 PM     Lab Results   Component Value Date/Time    Iron 30 (L) 01/28/2021 03:48 PM    TIBC 718 (H) 12/21/2016 01:00 PM    Iron % saturation 3 12/21/2016 01:00 PM    Ferritin 6 (L) 01/28/2021 03:48 PM     Lab Results   Component Value Date/Time    Folate 8.7 01/28/2021 03:48 PM     Lab Results   Component Value Date/Time    Vitamin D 25-Hydroxy 12.2 (L) 01/28/2021 03:48 PM                 Assessment:     Morbid obesity with associated comorbidity    Plan:     laparoscopic adjustable gastric band surgery    This is a 35 y.o. female with a BMI of Body mass index is 45.12 kg/m². and the weight-related co-morbidties of (as above). Ryann meets the NIH criteria for bariatric surgery based upon the BMI of Body mass index is 45.12 kg/m². and multiple weight-related co-morbidties.  Juan Carlos Sethi has elected laparoscopic gastric bypass as her intervention of choice for treatment of morbid obestiy through surgical means secondary to its uniform results,  profound baseline suppression of hunger and pace at which weight is lost.    In the office today, following Ryann's history and physical examination, a 30 minute discussion regarding the anatomic alterations for the laparoscopic gastric bypass  was undertaken. The dietary expectations and the patient  dependent factors for success were thoroughly discussed, to include the need for interval follow-up and long-term dietary changes associated with success. The possible short and long term  complications of the gastric bypass were also discussed, to include but not limited to;death, DVT/PE, staple line leak, bleeding, stricture formation, infection,internal hernia  and pouch dilation. Specific weight related outcomes for success were also discussed with an emphasis on careful and close follow-up with the first year and dietary behavior modification over the first years as baseline cyclical hunger returns  The patient expressed an understanding of the above factors, and her questions were answered in their entirety. In addition, the patient attended a 1.5 hour power point seminar regarding obesity, surgical weight loss including, adjustable gastric band, gastric bypass, and sleeve gastrectomy. This discussion contrasted the different surgical techniques, mechanisms of actions and expected outcomes, and surgical and medical risks associated with each procedure. During this seminar, there was a long question and answer session where each questions was answered until there were no additional questions. Today, the patient had all of her questions answered and desires to proceed with  bariatric surgery initially choosing the gastric bypass as her surgical option.     Secondary Diagnoses:         Signed By: Rupert Teixeira MD     April 30, 2021

## 2021-05-04 ENCOUNTER — OFFICE VISIT (OUTPATIENT)
Dept: SURGERY | Age: 31
End: 2021-05-04

## 2021-05-04 VITALS — BODY MASS INDEX: 45.01 KG/M2 | WEIGHT: 280.1 LBS | HEIGHT: 66 IN

## 2021-05-04 DIAGNOSIS — E66.01 MORBID OBESITY (HCC): Primary | ICD-10-CM

## 2021-05-04 NOTE — PROGRESS NOTES
Medical Weight Loss Multi-Disciplinary Program    Name: Mireya Anderson   : 1990    Session# 2   Pt attended in-person class. Weight obtained in office. Date: 2021    Visit Vitals  Ht 5' 6\" (1.676 m)   Wt 127.1 kg (280 lb 1.6 oz)   BMI 45.21 kg/m²         Dietary Instructions    Reviewed intake  Understanding low carbohydrates, low sugar, higher protein meals  Instruction given for personal dietary changes  Discussed perceived compliance  Comments: RD Reviewed Diet History and Physical Activity/Exercise habits. Recommended dietary changes for both before and after surgery. Reviewed recommendation to follow 3412-9894 calorie diet, working to reduce total carbohydrate intake to  g or less per day and increasing protein intake to  g per day, compared current intake to recommendations. Recommend pt adopt an exercise routine of at least 3-5 days a week for at least 30 minutes/day. If pt unable to participate in walking, haydee, swimming, or other exercises, recommend pt work with a physical therapist and/or participate in chair exercises, yoga, and/or increase activities of daily living as able. Patient participated in pre-recorded education class video. Recorded video of dietitian discussing role of low carbohydrate diet for promoting weight loss before and after surgery. Reviewed sources of carbohydrates, label reading tips and reviewed exchange list for carbohydrates. Discussed ways to reduce carbohydrates and reviewed example day of high carbohydrate vs. Low carbohydrate diet. This class reviewed materials provided at patients initial appointment and provided in education packet to patient. Patient watched the video in its entirety and turned in the 3 embedded passcodes from the video session.       Behavior Modification    Identify obstacles to trigger change  Achieving/Rewarding goals met  Positive attitude  Comments: Reinforced importance continuing to modify lifestyle patterns and behaviors to promote weight loss and long term weight maintenance     Comments:  Pt instructed to start carbohydrate counting and label reading. Recommend pt start reducing and eliminating sugar-sweetened beverages, concentrated sweets (cakes/candy/cupcakes/poptarts/cereals/etc.), juice, and high-carbohydrate foods. Pt completed Bariatric-specific nutrition questionnaire. RD reviewed answers and provided feedback on responses that align with bariatric recommendations to behavior changes. Provided feedback on recommendations, areas for improvement, and provided positive feedback on areas where pt is making positive behavior changes. Pt questionnaire is included in chart separate from this note. Physical Activity/Exercise      Patient has been educated on the importance of starting a physical activity regimen, reinforced the importance of regular physical activity for total health and weight management. Suggested starting exercise regimen of cardiovascular and resistance training for at least 3-5 days per week for 30-60 minutes. Encouraged pt to set and keep weekly exercise goals. Goals:   1. Work to increase to 3-4 small meals per day, with planned snacks as needed. Recommend following plate method for meal planning - focusing on lean protein, non-starchy vegetables, and measured amounts of starch. - Goal of  g protein and  g carbohydrate per day. - Recommend continuing protein supplement as meal replacement at least 1x/day OR as high protein snack option  2. Increase non caloric fluid to 64 oz per day. Eliminate caffeine, added sugar, carbonation, and straws.               -Continue to work to decrease sugar sweetened beverages - goal of calorie free beverages only              -Must eliminate caffeine prior to surgery and avoid for ~6-8 weeks   -Practice 30:30 rule,  food and flood   3.  Start activity regimen, work to increase ADL  4. Start Complete MVI    Candidate for surgery (per RD):  PENDING

## 2021-05-05 VITALS — WEIGHT: 281.7 LBS | BODY MASS INDEX: 45.27 KG/M2 | HEIGHT: 66 IN

## 2021-05-05 NOTE — PROGRESS NOTES
Medical Weight Loss Multi-Disciplinary Program    Name: Juhi Shaw   : 1990    Session# 3   Pt attended in-person class. Weight obtained in office. Date: 2021    Visit Vitals  Ht 5' 6\" (1.676 m)   Wt 127.8 kg (281 lb 11.2 oz)   BMI 45.47 kg/m²       Dietary Instructions    Reviewed intake  Understanding low carbohydrates, low sugar, higher protein meals  Instruction given for personal dietary changes  Discussed perceived compliance  Comments: Comments: RD Reviewed Diet History and Physical Activity/Exercise habits. Recommended dietary changes discussed for both before and after surgery. Reviewed recommendation to follow 8940-9543 calorie diet, working to reduce total carbohydrate intake to  g or less per day and increasing protein intake to  g per day, compared current intake to recommendations. Recommend pt adopt an exercise routine of at least 3-5 days a week for at least 30 minutes/day. If pt unable to participate in walking, haydee, swimming, or other exercises, recommend pt work with a physical therapist and/or participate in chair exercises, yoga, and/or increase activities of daily living as able. Patient participated in pre-recorded education class video. Recorded video of dietitian discussing role of high protein diet for promoting weight loss before and after surgery. Reviewed sources of protein, label reading tips, and ways to measure proteins. Spent portion of education emphasizing the role of protein supplements in the post operative diet. Reviewed label reading for protein shakes and types of protein supplements. Patient watched the video in its entirety and turned in the 3 embedded passcodes from the video session.       Behavior Modification    Identify obstacles to trigger change  Achieving/Rewarding goals met  Positive attitude  Comments: Reinforced importance continuing to modify lifestyle patterns and behaviors to promote weight loss and long term weight maintenance. Comments:  Pt set goals to make sure they are meeting protein recommendations, switch to lean sources of protein, start sampling protein supplements, and read labels for protein content in foods. Pt completed Bariatric-specific nutrition questionnaire. RD reviewed answers and provided feedback on responses that align with bariatric recommendations to behavior changes. Provided feedback on recommendations, areas for improvement, and provided positive feedback on areas where pt is making positive behavior changes. Pt questionnaire is included in chart separate from this note. Physical Activity/Exercise    Discussed Perceived Compliance  Motivation    Patient has been educated on the importance of starting and maintaining a physical activity regimen, reinforced the importance of regular physical activity for total health and weight management. Suggested starting or continuing exercise regimen of cardiovascular and resistance training for at least 3-5 days per week for 30-60 minutes. Recommend pt track progress weekly. Goals:   1. Work to increase to 3-4 small meals per day, with planned snacks as needed. Recommend following plate method for meal planning - focusing on lean protein, non-starchy vegetables, and measured amounts of starch. - Goal of  g protein and  g carbohydrate per day. - Recommend continuing protein supplement as meal replacement at least 1x/day OR as high protein snack option  2. Increase non caloric fluid to 64 oz per day. Eliminate caffeine, added sugar, carbonation, and straws.               -Continue to work to decrease sugar sweetened beverages - goal of calorie free beverages only              -Must eliminate caffeine prior to surgery and avoid for ~6-8 weeks   -Practice 30:30 rule,  food and flood   3.  Start activity regimen, work to increase ADL  4. Start Complete MVI    Candidate for surgery (per RD): PENDING

## 2021-06-29 ENCOUNTER — OFFICE VISIT (OUTPATIENT)
Dept: SURGERY | Age: 31
End: 2021-06-29

## 2021-06-29 DIAGNOSIS — E66.01 MORBID OBESITY (HCC): Primary | ICD-10-CM

## 2021-06-30 VITALS — BODY MASS INDEX: 45.5 KG/M2 | HEIGHT: 66 IN | WEIGHT: 283.1 LBS

## 2021-06-30 NOTE — PROGRESS NOTES
Medical Weight Loss Multi-Disciplinary Program    Name: Jennifer Jhaveri   : 1990    Session# 4 Pt attended in-person class. Weight obtained in office. Date: 2021    Visit Vitals  Ht 5' 6\" (1.676 m)   Wt 128.4 kg (283 lb 1.6 oz)   BMI 45.69 kg/m²       Pt has gained 1.4 lbs since last visit on 21, however a 0.8 lb wt loss since first nutrition visit 2021. Dietary Instructions    Reviewed intake  Understanding low carbohydrates, low sugar, higher protein meals  Instruction given for personal dietary changes  Discussed perceived compliance  Comments: RD Reviewed Diet History and Physical Activity/Exercise habits. Recommended dietary changes discussed for both before and after surgery. Reviewed recommendation to follow 6407-6211 calorie diet, working to reduce total carbohydrate intake to  g or less per day and increasing protein intake to  g per day, compared current intake to recommendations. Recommend pt adopt an exercise routine of at least 3-5 days a week for at least 30 minutes/day. If pt unable to participate in walking, haydee, swimming, or other exercises, recommend pt work with a physical therapist and/or participate in chair exercises, yoga, and/or increase activities of daily living as able. Patient participated in pre-recorded education class video. Recorded video of dietitian discussing key diet principles. Reviewed importance of small structured meals, adequate hydration,  food and fluid, supplementation of vitamins/minerals and protein shakes, also reviewed recommendations for physical activity. Patient has previously received pre-operative education packet that reviewed these topics, but discussed in details the role of dietary and behavior changes to promote optimal long term weight loss following bariatric surgery. Patient watched the video in its entirety and turned in the 3 embedded passcodes from the video session.  Pt completed additional \"homework\" for this visit's session, including a food recall, physical activity summary, and additional nutrition-related responses to questionnaire. Behavior Modification    Identify obstacles to trigger change  Achieving/Rewarding goals met  Positive attitude  Comments: Reinforced importance continuing to modify lifestyle patterns and behaviors to promote weight loss and long term weight maintenance     Comments:  During today's lesson discussed post-operative key diet principles and reviewed dietary and behavior changes to begin making now. Pt completed Bariatric-specific nutrition questionnaire. RD reviewed answers and provided feedback on responses that align with bariatric recommendations to behavior changes. Provided feedback on recommendations, areas for improvement, and provided positive feedback on areas where pt is making positive behavior changes. Pt questionnaire is included in chart separate from this note. All current stated pt questions answered. Physical Activity/Exercise    Discussed Perceived Compliance  Motivation    Patient has been educated on the importance of a physical activity regimen, reinforced the importance of regular physical activity for total health and weight management. Suggested starting or continuing exercise regimen of cardiovascular and resistance training for at least 3-5 days per week for 30-60 minutes. Recommend pt track weekly progress and adherence to recommendations. Goals:   1. Work to increase to 3-4 small meals per day, with planned snacks as needed. Recommend following plate method for meal planning - focusing on lean protein, non-starchy vegetables, and measured amounts of starch. - Goal of  g protein and  g carbohydrate per day. - Recommend continuing protein supplement as meal replacement at least 1x/day OR as high protein snack option  2. Increase non caloric fluid to 64 oz per day.   Eliminate caffeine, added sugar, carbonation, and straws.               -Continue to work to decrease sugar sweetened beverages - goal of calorie free beverages only              -Must eliminate caffeine prior to surgery and avoid for ~6-8 weeks   -Practice 30:30 rule,  food and flood   3. Start activity regimen, work to increase ADL  4. Start Complete MVI    Candidate for surgery (per RD):Candidate for surgery (per RD): Yes - Pt has fulfilled nutrition education requirements set by insurance. Pt demonstrates good understanding of post-op dietary principles such as eating 3 meals/day, focusing on lean protein and non-starchy vegetables at meals, eliminating SSB, and drinking adequate non-caloric fluids every day. Pt is engaging in only 4 days/week of physical activity. Pt does not have any questions/concerns at this time.

## 2021-07-09 ENCOUNTER — TELEPHONE (OUTPATIENT)
Dept: SURGERY | Age: 31
End: 2021-07-09

## 2021-07-09 NOTE — TELEPHONE ENCOUNTER
Pt called and wanted a refill on Vit D 50,000 units. Last filled on 1/29/2021 #26 with zero refills. Last office visit 6/29/2021. Will forward to Dr Elías Kirby to see if this can be refilled. Last labs 3/2021.

## 2021-08-04 DIAGNOSIS — Z01.812 BLOOD TESTS PRIOR TO TREATMENT OR PROCEDURE: ICD-10-CM

## 2021-08-04 DIAGNOSIS — E66.01 MORBID OBESITY (HCC): Primary | ICD-10-CM

## 2021-08-16 ENCOUNTER — HOSPITAL ENCOUNTER (OUTPATIENT)
Dept: PREADMISSION TESTING | Age: 31
Discharge: HOME | End: 2021-08-16

## 2021-08-16 VITALS — HEIGHT: 66 IN | BODY MASS INDEX: 44.2 KG/M2 | WEIGHT: 275 LBS

## 2021-08-16 RX ORDER — FAMOTIDINE 20 MG/50ML
20 INJECTION, SOLUTION INTRAVENOUS
Status: CANCELLED | OUTPATIENT
Start: 2021-08-16 | End: 2021-08-17

## 2021-08-16 RX ORDER — DOXYCYCLINE 100 MG/1
100 CAPSULE ORAL 2 TIMES DAILY
COMMUNITY

## 2021-08-16 RX ORDER — ENOXAPARIN SODIUM 100 MG/ML
40 INJECTION SUBCUTANEOUS
Status: CANCELLED | OUTPATIENT
Start: 2021-08-16 | End: 2021-08-17

## 2021-08-16 RX ORDER — BISMUTH SUBSALICYLATE 262 MG
1 TABLET,CHEWABLE ORAL DAILY
COMMUNITY

## 2021-08-16 RX ORDER — SODIUM CHLORIDE, SODIUM LACTATE, POTASSIUM CHLORIDE, CALCIUM CHLORIDE 600; 310; 30; 20 MG/100ML; MG/100ML; MG/100ML; MG/100ML
125 INJECTION, SOLUTION INTRAVENOUS CONTINUOUS
Status: CANCELLED | OUTPATIENT
Start: 2021-08-16

## 2021-08-16 RX ORDER — BUSPIRONE HYDROCHLORIDE 10 MG/1
10 TABLET ORAL 2 TIMES DAILY
COMMUNITY

## 2021-08-16 RX ORDER — ACETAMINOPHEN 500 MG
1000 TABLET ORAL ONCE
Status: CANCELLED | OUTPATIENT
Start: 2021-08-16 | End: 2021-08-16

## 2021-08-16 RX ORDER — FLUOXETINE HYDROCHLORIDE 40 MG/1
40 CAPSULE ORAL DAILY
COMMUNITY

## 2021-08-16 RX ORDER — NYSTATIN 100000 [USP'U]/ML
500000 SUSPENSION ORAL
Status: CANCELLED | OUTPATIENT
Start: 2021-08-16 | End: 2021-08-16

## 2021-08-16 NOTE — PERIOP NOTES
Operative consent filled out according to MD order on surgical posting, verbatim. During PAT interview, patient advised to self quarantine 3 days prior to DOS. Patient instructed to come in for Covid19 testing from 9-11 am on 08/30/2021 prior to surgery. Patient instructed to not bring any valuables on DOS including Pocketbook, wallet, jewelry, cell phone, lap top computer or tablet. Patient instructed not to wear make up, powders, deodorant, creams, or lotions on DOS. Patient to receive CHG skin preparation at time of PAT and instructions per Dr Codie Baer. Patient is aware of one visitor policy in surgical pavilion.

## 2021-08-23 ENCOUNTER — HOSPITAL ENCOUNTER (OUTPATIENT)
Dept: PREADMISSION TESTING | Age: 31
Discharge: HOME | End: 2021-08-23
Payer: COMMERCIAL

## 2021-08-23 ENCOUNTER — OFFICE VISIT (OUTPATIENT)
Dept: SURGERY | Age: 31
End: 2021-08-23
Payer: COMMERCIAL

## 2021-08-23 ENCOUNTER — TELEPHONE (OUTPATIENT)
Dept: SURGERY | Age: 31
End: 2021-08-23

## 2021-08-23 VITALS
DIASTOLIC BLOOD PRESSURE: 77 MMHG | RESPIRATION RATE: 16 BRPM | OXYGEN SATURATION: 99 % | BODY MASS INDEX: 45.67 KG/M2 | HEIGHT: 66 IN | WEIGHT: 284.2 LBS | HEART RATE: 98 BPM | SYSTOLIC BLOOD PRESSURE: 103 MMHG

## 2021-08-23 DIAGNOSIS — E66.01 MORBID OBESITY (HCC): ICD-10-CM

## 2021-08-23 DIAGNOSIS — Z98.84 STATUS POST GASTRIC BYPASS FOR OBESITY: ICD-10-CM

## 2021-08-23 DIAGNOSIS — E66.01 MORBID OBESITY WITH BMI OF 40.0-44.9, ADULT (HCC): ICD-10-CM

## 2021-08-23 DIAGNOSIS — Z98.84 H/O GASTRIC BYPASS: ICD-10-CM

## 2021-08-23 DIAGNOSIS — G89.18 POST-OP PAIN: Primary | ICD-10-CM

## 2021-08-23 DIAGNOSIS — R73.03 BORDERLINE DIABETES: ICD-10-CM

## 2021-08-23 DIAGNOSIS — D50.8 IRON DEFICIENCY ANEMIA SECONDARY TO INADEQUATE DIETARY IRON INTAKE: ICD-10-CM

## 2021-08-23 DIAGNOSIS — K90.9 INTESTINAL MALABSORPTION, UNSPECIFIED TYPE: ICD-10-CM

## 2021-08-23 LAB
ALBUMIN SERPL-MCNC: 4.1 G/DL (ref 3.4–5)
ALBUMIN/GLOB SERPL: 1.2 {RATIO} (ref 0.8–1.7)
ALP SERPL-CCNC: 99 U/L (ref 45–117)
ALT SERPL-CCNC: 79 U/L (ref 13–56)
ANION GAP SERPL CALC-SCNC: 5 MMOL/L (ref 3–18)
AST SERPL-CCNC: 27 U/L (ref 10–38)
ATRIAL RATE: 84 BPM
BASOPHILS # BLD: 0 K/UL (ref 0–0.1)
BASOPHILS NFR BLD: 0 % (ref 0–2)
BILIRUB SERPL-MCNC: 0.4 MG/DL (ref 0.2–1)
BUN SERPL-MCNC: 9 MG/DL (ref 7–18)
BUN/CREAT SERPL: 19 (ref 12–20)
CALCIUM SERPL-MCNC: 8.8 MG/DL (ref 8.5–10.1)
CALCULATED P AXIS, ECG09: 51 DEGREES
CALCULATED R AXIS, ECG10: 110 DEGREES
CALCULATED T AXIS, ECG11: 30 DEGREES
CHLORIDE SERPL-SCNC: 109 MMOL/L (ref 100–111)
CO2 SERPL-SCNC: 25 MMOL/L (ref 21–32)
CREAT SERPL-MCNC: 0.48 MG/DL (ref 0.6–1.3)
DIAGNOSIS, 93000: NORMAL
DIFFERENTIAL METHOD BLD: ABNORMAL
EOSINOPHIL # BLD: 0.1 K/UL (ref 0–0.4)
EOSINOPHIL NFR BLD: 1 % (ref 0–5)
ERYTHROCYTE [DISTWIDTH] IN BLOOD BY AUTOMATED COUNT: 12.9 % (ref 11.6–14.5)
GLOBULIN SER CALC-MCNC: 3.5 G/DL (ref 2–4)
GLUCOSE SERPL-MCNC: 81 MG/DL (ref 74–99)
HCG SERPL QL: NEGATIVE
HCT VFR BLD AUTO: 41.6 % (ref 35–45)
HGB BLD-MCNC: 14.3 G/DL (ref 12–16)
LYMPHOCYTES # BLD: 3.1 K/UL (ref 0.9–3.6)
LYMPHOCYTES NFR BLD: 39 % (ref 21–52)
MCH RBC QN AUTO: 29.5 PG (ref 24–34)
MCHC RBC AUTO-ENTMCNC: 34.4 G/DL (ref 31–37)
MCV RBC AUTO: 86 FL (ref 74–97)
MONOCYTES # BLD: 0.4 K/UL (ref 0.05–1.2)
MONOCYTES NFR BLD: 6 % (ref 3–10)
NEUTS SEG # BLD: 4.2 K/UL (ref 1.8–8)
NEUTS SEG NFR BLD: 53 % (ref 40–73)
P-R INTERVAL, ECG05: 170 MS
PLATELET # BLD AUTO: 288 K/UL (ref 135–420)
PMV BLD AUTO: 8.7 FL (ref 9.2–11.8)
POTASSIUM SERPL-SCNC: 4.1 MMOL/L (ref 3.5–5.5)
PROT SERPL-MCNC: 7.6 G/DL (ref 6.4–8.2)
Q-T INTERVAL, ECG07: 404 MS
QRS DURATION, ECG06: 92 MS
QTC CALCULATION (BEZET), ECG08: 477 MS
RBC # BLD AUTO: 4.84 M/UL (ref 4.2–5.3)
SODIUM SERPL-SCNC: 139 MMOL/L (ref 136–145)
VENTRICULAR RATE, ECG03: 84 BPM
WBC # BLD AUTO: 7.9 K/UL (ref 4.6–13.2)

## 2021-08-23 PROCEDURE — 85025 COMPLETE CBC W/AUTO DIFF WBC: CPT

## 2021-08-23 PROCEDURE — 93005 ELECTROCARDIOGRAM TRACING: CPT

## 2021-08-23 PROCEDURE — 80053 COMPREHEN METABOLIC PANEL: CPT

## 2021-08-23 PROCEDURE — 84703 CHORIONIC GONADOTROPIN ASSAY: CPT

## 2021-08-23 PROCEDURE — 36415 COLL VENOUS BLD VENIPUNCTURE: CPT

## 2021-08-23 PROCEDURE — 99215 OFFICE O/P EST HI 40 MIN: CPT | Performed by: SPECIALIST

## 2021-08-23 RX ORDER — ENOXAPARIN SODIUM 100 MG/ML
40 INJECTION SUBCUTANEOUS EVERY 12 HOURS
Qty: 14 SYRINGE | Refills: 0 | Status: SHIPPED | OUTPATIENT
Start: 2021-08-23 | End: 2021-08-30

## 2021-08-23 RX ORDER — OXYCODONE AND ACETAMINOPHEN 5; 325 MG/1; MG/1
1 TABLET ORAL
Qty: 30 TABLET | Refills: 0 | Status: SHIPPED | OUTPATIENT
Start: 2021-08-23 | End: 2021-08-30

## 2021-08-23 NOTE — H&P (VIEW-ONLY)
Lap Band over existing Gastric Bypass - History and Physical    Subjective: The patient is a 35 y.o. obese female with a Body mass index is 45.87 kg/m². .   she presents now to review their work up to date to see if they are a candidate for surgery and whether or not to proceed with the previously requested procedure. Her 2007 [facility] performed gastric bypass was revised via this office in 2018. Bariatric comorbidities continue to include:   Patient Active Problem List   Diagnosis Code    H/O gastric bypass Z98.84    Iron deficiency anemia secondary to inadequate dietary iron intake D50.8    Morbid obesity with BMI of 40.0-44.9, adult ([facility] E66.01, Z68.41    Morbid obesity ([facility]) E66.01    Depression F32.9    Borderline diabetes R73.03    Intestinal malabsorption K90.9    Psychotic disorder ([facility] F29    Status post gastric bypass for obesity Z98.84    Anemia D64.9    Morbid obesity with body mass index (BMI) of 40.0 to 49.9 ([facility]) E66.01    Anxiety F41.9    Rosacea, acne L71.9      They have been generally well prior to this visit and have had no recent significant illnesses. The patient has had no gastrointestinal issues that would preclude them from proceeding with the surgery they have chosen. Les Heard has recently tried a preoperative weight loss program  in addition to seeing a bariatric nutritionist preoperatively. We have discussed on at least one other occasion about the various types of surgical weight loss procedures and they have considered these options after our initial consultation. We have once again discussed these procedures in detail and they have now decided on a surgical procedure. They present today to discuss this and confirm that their evaluation pre operatively is acceptable to continue with surgery. The patient desires laparoscopic adjustable gastric band surgery for surgical weight loss.       The patients goal weight is 167 lb. (this represents a BMI of 29)    Past Medical History:   Diagnosis Date    Anemia     has required iron infusions    Anxiety     Depression     Intestinal malabsorption     Morbid obesity (HCC)     Morbid obesity with body mass index (BMI) of 40.0 to 49.9 (HCC)     Rosacea, acne     Status post gastric bypass for obesity     2018 - [place] - [facility] of [facility] bypass     Past Surgical History:   Procedure Laterality Date    HX  SECTION  2020    [facility]    HX GI  2018    [facility]vision of [facility] gastric bypass / [place]    HX TUBAL LIGATION        Social History     Tobacco Use    Smoking status: Never Smoker    Smokeless tobacco: Never Used   Substance Use Topics    Alcohol use: No      Family History   Problem Relation Age of Onset    No Known Problems Mother     No Known Problems Father     No Known Problems Sister     No Known Problems Brother     No Known Problems Maternal Aunt     No Known Problems Maternal Uncle     No Known Problems Paternal Aunt     No Known Problems Paternal Uncle     No Known Problems Maternal Grandmother     No Known Problems Maternal Grandfather     No Known Problems Paternal Grandmother     No Known Problems Paternal Grandfather     No Known Problems Other       Current Outpatient Medications   Medication Sig Dispense Refill    oxyCODONE-acetaminophen (PERCOCET) 5-325 mg per tablet Take 1 Tablet by mouth every four (4) hours as needed for Pain for up to 7 days. Max Daily Amount: 6 Tablets. 30 Tablet 0    doxycycline (MONODOX) 100 mg capsule Take 100 mg by mouth two (2) times a day.  busPIRone (BUSPAR) 10 mg tablet Take 10 mg by mouth two (2) times a day. Indications: repeated episodes of anxiety      FLUoxetine (PROzac) 40 mg capsule Take 40 mg by mouth daily.  propranolol HCl (INDERAL PO) Take 20 mg by mouth every six (6) hours as needed (anxiety).  multivitamin (ONE A DAY) tablet Take 1 Tablet by mouth daily.       enoxaparin (LOVENOX) 40 mg/0.4 mL 0.4 mL by SubCUTAneous route every twelve (12) hours every twelve (12) hours for 7 days. Indications: deep vein thrombosis prevention (Patient not taking: Reported on 8/23/2021) 14 Syringe 0    acetaminophen (TylenoL) 325 mg tablet Take 650 mg by mouth every four (4) hours as needed for Pain.        Allergies   Allergen Reactions    Morphine Swelling            Review of Symptoms:     General - No history or complaints of unexpected fever, chills, or weight loss  Head/Neck - No history or complaints of headache, diplopia, dysphagia, hearing loss  Cardiac - No history or complaints of chest pain, palpitations, murmur, or shortness of breath  Pulmonary - No history or complaints of shortness of breath, productive cough, hemoptysis  Gastrointestinal - No history or complaints of reflux,  abdominal pain, obstipation/constipation, blood per rectum  Genitourinary - No history or complaints of hematuria/dysuria, stress urinary incontinence symptoms, or renal lithiasis  Musculoskeletal - No history or complaints of joint pain or muscular weakness  Hematologic - No history or complaints of bleeding disorders, blood transfusions, sickle cell anemia  Neurologic - No history or complaints of  migraine headaches, seizure activity, syncopal episodes, TIA or stroke  Integumentary - No history or complaints of rashes, abnormal nevi, skin cancer  Gynecological -     Objective:     Visit Vitals  /77 (BP 1 Location: Left upper arm, BP Patient Position: Sitting, BP Cuff Size: Large adult)   Pulse 98   Resp 16   Ht 5' 6\" (1.676 m)   Wt 128.9 kg (284 lb 3.2 oz)   LMP 08/06/2021 (Approximate)   SpO2 99%   BMI 45.87 kg/m²       Physical Examination: General appearance - alert, well appearing, and in no distress  Mental status - alert, oriented to person, place, and time  Eyes - pupils equal and reactive, extraocular eye movements intact  Ears - bilateral TM's and external ear canals normal  Nose - normal and patent, no erythema, discharge or polyps  Mouth - mucous membranes moist, pharynx normal without lesions  Neck - supple, no significant adenopathy  Lymphatics - no palpable lymphadenopathy, no hepatosplenomegaly  Chest - clear to auscultation, no wheezes, rales or rhonchi, symmetric air entry  Heart - normal rate, regular rhythm, normal S1, S2, no murmurs, rubs, clicks or gallops  Abdomen - soft, nontender, nondistended, no masses or organomegaly  Back exam - full range of motion, no tenderness, palpable spasm or pain on motion  Neurological - alert, oriented, normal speech, no focal findings or movement disorder noted  Musculoskeletal - no joint tenderness, deformity or swelling  Extremities - peripheral pulses normal, no pedal edema, no clubbing or cyanosis  Skin - normal coloration and turgor, no rashes, no suspicious skin lesions noted    Labs / Preoperative Evaluations:     No results found for this or any previous visit (from the past 1008 hour(s)). Assessment:     Morbid obesity with comorbidity    Plan:     laparoscopic adjustable gastric band surgery    This is a 35 y.o. female with a BMI of Body mass index is 45.87 kg/m². and the weight-related co-morbidties as noted above. Ryann meets the NIH criteria for bariatric surgery based upon the BMI of Body mass index is 45.87 kg/m². and the weight-related co-morbidties. Alberta Dimitry has elected laparoscopic adjustable gastric band as her intervention of choice for treatment of morbid obestiy through surgical means secondary to its safety profile, reversibility and decreases in operative risks over gastric bypass or sleeve gastrectomy. In the office today, following Ryann's history and physical examination, a 40 minute discussion regarding the anatomic alterations for the Laparoscopic Adjustable Gastric Band was undertaken.  The dietary expectations and the patient and  dependent factors for success were thoroughly discussed, to include the need for frequent interval follow-up, rules of adjustable gastric band, need for periodic adjustment (4-6 in the first year) and long-term dietary changes associated with success. The possible complications of the adjustable gastric band  were also discussed, to include; death,DVT/PE (which are rare), band slip, erosion, pouch dilation, port malposition and infection. Specific weight related outcomes for success were also discussed with an emphasis on careful and close follow-up with the first year. The patient expressed an understanding of the above factors, and her questions were answered in their entirety. In addition, the patient attended a 1.5 hour power point seminar regarding obesity, surgical weight loss including, adjustable gastric band, gastric bypass, and sleeve gastrectomy. This discussion contrasted the different surgical techniques, mechanisms of actions and expected outcomes, and surgical and medical risks associated with each procedure. During this seminar, there was a long question and answer session where each questions was answered until there were no additional questions. Today, the patient had all of her questions answered and the decision was made today that the patient's preoperative evaluation is acceptable for them  to proceed with bariatric surgery  choosing adjustable gastric band as her surgical option. The patient understands the plan of action    Her post-op meds have been sent to her pharmacy     Secondary Diagnoses:     DVT / Pulmonary Embolus Risk - The patient is at a higher risk for post operative DVT / pulmonary embolus secondary to their morbid obese status, relative sedentary lifestyle, and impending general anesthetic. We will plan to use anticoagulation therapy pre and post operative as well as  pneumatic compression devices and encourage ambulation once on the hospital nursing floor.   The need for possible at home anticoagulation therapy has also been discussed and any decision on this matter will be made during post operative evaluations. The patient understands that their efforts at ambulation are of vital importance to reduce the risk of this complication thus placing significant burden on them as to the prevention of such issues.  Signs and symptoms of DVT / PE have been discussed with the patient and they have been instructed to call the office if any these occur in the \"at home\" post op phase      Signed By: Roberto Doss MD     August 23, 2021

## 2021-08-23 NOTE — PROGRESS NOTES
Lap Band over existing Gastric Bypass - History and Physical    Subjective: The patient is a 35 y.o. obese female with a Body mass index is 45.87 kg/m². .   she presents now to review their work up to date to see if they are a candidate for surgery and whether or not to proceed with the previously requested procedure. Her [facility] bypass was revised via this office in 2018. Bariatric comorbidities continue to include:   Patient Active Problem List   Diagnosis Code    H/O gastric bypass Z98.84    Iron deficiency anemia secondary to inadequate dietary iron intake D50.8    Morbid obesity with BMI of 40.0-44.9, adult ([facility] E66.01, Z68.41    Morbid obesity ([facility]) E66.01    Depression F32.9    Borderline diabetes R73.03    Intestinal malabsorption K90.9    Psychotic disorder ([facility] F29    Status post gastric bypass for obesity Z98.84    Anemia D64.9    Morbid obesity with body mass index (BMI) of 40.0 to 49.9 ([facility]) E66.01    Anxiety F41.9    Rosacea, acne L71.9      They have been generally well prior to this visit and have had no recent significant illnesses. The patient has had no gastrointestinal issues that would preclude them from proceeding with the surgery they have chosen. Claus Jhaveri has recently tried a preoperative weight loss program  in addition to seeing a bariatric nutritionist preoperatively. We have discussed on at least one other occasion about the various types of surgical weight loss procedures and they have considered these options after our initial consultation. We have once again discussed these procedures in detail and they have now decided on a surgical procedure. They present today to discuss this and confirm that their evaluation pre operatively is acceptable to continue with surgery. The patient desires laparoscopic adjustable gastric band surgery for surgical weight loss.       The patients goal weight is 167 lb. (this represents a BMI of 29)    Past Medical History:   Diagnosis Date    Anemia     has required iron infusions    Anxiety     Depression     Intestinal malabsorption     Morbid obesity (HCC)     Morbid obesity with body mass index (BMI) of 40.0 to 49.9 (HCC)     Rosacea, acne     Status post gastric bypass for obesity     2018 - [place] - [facility]     Past Surgical History:   Procedure Laterality Date    HX  SECTION  2020    [facility]    HX GI  2018    [facility] of [facility] bypass / [place]    HX TUBAL LIGATION        Social History     Tobacco Use    Smoking status: Never Smoker    Smokeless tobacco: Never Used   Substance Use Topics    Alcohol use: No      Family History   Problem Relation Age of Onset    No Known Problems Mother     No Known Problems Father     No Known Problems Sister     No Known Problems Brother     No Known Problems Maternal Aunt     No Known Problems Maternal Uncle     No Known Problems Paternal Aunt     No Known Problems Paternal Uncle     No Known Problems Maternal Grandmother     No Known Problems Maternal Grandfather     No Known Problems Paternal Grandmother     No Known Problems Paternal Grandfather     No Known Problems Other       Current Outpatient Medications   Medication Sig Dispense Refill    oxyCODONE-acetaminophen (PERCOCET) 5-325 mg per tablet Take 1 Tablet by mouth every four (4) hours as needed for Pain for up to 7 days. Max Daily Amount: 6 Tablets. 30 Tablet 0    doxycycline (MONODOX) 100 mg capsule Take 100 mg by mouth two (2) times a day.  busPIRone (BUSPAR) 10 mg tablet Take 10 mg by mouth two (2) times a day. Indications: repeated episodes of anxiety      FLUoxetine (PROzac) 40 mg capsule Take 40 mg by mouth daily.  propranolol HCl (INDERAL PO) Take 20 mg by mouth every six (6) hours as needed (anxiety).  multivitamin (ONE A DAY) tablet Take 1 Tablet by mouth daily.       enoxaparin (LOVENOX) 40 mg/0.4 mL 0.4 mL by SubCUTAneous route every twelve (12) hours every twelve (12) hours for 7 days. Indications: deep vein thrombosis prevention (Patient not taking: Reported on 8/23/2021) 14 Syringe 0    acetaminophen (TylenoL) 325 mg tablet Take 650 mg by mouth every four (4) hours as needed for Pain.        Allergies   Allergen Reactions    Morphine Swelling            Review of Symptoms:     General - No history or complaints of unexpected fever, chills, or weight loss  Head/Neck - No history or complaints of headache, diplopia, dysphagia, hearing loss  Cardiac - No history or complaints of chest pain, palpitations, murmur, or shortness of breath  Pulmonary - No history or complaints of shortness of breath, productive cough, hemoptysis  Gastrointestinal - No history or complaints of reflux,  abdominal pain, obstipation/constipation, blood per rectum  Genitourinary - No history or complaints of hematuria/dysuria, stress urinary incontinence symptoms, or renal lithiasis  Musculoskeletal - No history or complaints of joint pain or muscular weakness  Hematologic - No history or complaints of bleeding disorders, blood transfusions, sickle cell anemia  Neurologic - No history or complaints of  migraine headaches, seizure activity, syncopal episodes, TIA or stroke  Integumentary - No history or complaints of rashes, abnormal nevi, skin cancer  Gynecological -     Objective:     Visit Vitals  /77 (BP 1 Location: Left upper arm, BP Patient Position: Sitting, BP Cuff Size: Large adult)   Pulse 98   Resp 16   Ht 5' 6\" (1.676 m)   Wt 128.9 kg (284 lb 3.2 oz)   LMP 08/06/2021 (Approximate)   SpO2 99%   BMI 45.87 kg/m²       Physical Examination: General appearance - alert, well appearing, and in no distress  Mental status - alert, oriented to person, place, and time  Eyes - pupils equal and reactive, extraocular eye movements intact  Ears - bilateral TM's and external ear canals normal  Nose - normal and patent, no erythema, discharge or polyps  Mouth - mucous membranes moist, pharynx normal without lesions  Neck - supple, no significant adenopathy  Lymphatics - no palpable lymphadenopathy, no hepatosplenomegaly  Chest - clear to auscultation, no wheezes, rales or rhonchi, symmetric air entry  Heart - normal rate, regular rhythm, normal S1, S2, no murmurs, rubs, clicks or gallops  Abdomen - soft, nontender, nondistended, no masses or organomegaly  Back exam - full range of motion, no tenderness, palpable spasm or pain on motion  Neurological - alert, oriented, normal speech, no focal findings or movement disorder noted  Musculoskeletal - no joint tenderness, deformity or swelling  Extremities - peripheral pulses normal, no pedal edema, no clubbing or cyanosis  Skin - normal coloration and turgor, no rashes, no suspicious skin lesions noted    Labs / Preoperative Evaluations:     No results found for this or any previous visit (from the past 1008 hour(s)). Assessment:     Morbid obesity with comorbidity    Plan:     laparoscopic adjustable gastric band surgery    This is a 35 y.o. female with a BMI of Body mass index is 45.87 kg/m². and the weight-related co-morbidties as noted above. Ryann meets the NIH criteria for bariatric surgery based upon the BMI of Body mass index is 45.87 kg/m². and the weight-related co-morbidties. Britany Eldridge has elected laparoscopic adjustable gastric band as her intervention of choice for treatment of morbid obestiy through surgical means secondary to its safety profile, reversibility and decreases in operative risks over gastric bypass or sleeve gastrectomy. In the office today, following Ryann's history and physical examination, a 40 minute discussion regarding the anatomic alterations for the Laparoscopic Adjustable Gastric Band was undertaken.  The dietary expectations and the patient and  dependent factors for success were thoroughly discussed, to include the need for frequent interval follow-up, rules of adjustable gastric band, need for periodic adjustment (4-6 in the first year) and long-term dietary changes associated with success. The possible complications of the adjustable gastric band  were also discussed, to include; death,DVT/PE (which are rare), band slip, erosion, pouch dilation, port malposition and infection. Specific weight related outcomes for success were also discussed with an emphasis on careful and close follow-up with the first year. The patient expressed an understanding of the above factors, and her questions were answered in their entirety. In addition, the patient attended a 1.5 hour power point seminar regarding obesity, surgical weight loss including, adjustable gastric band, gastric bypass, and sleeve gastrectomy. This discussion contrasted the different surgical techniques, mechanisms of actions and expected outcomes, and surgical and medical risks associated with each procedure. During this seminar, there was a long question and answer session where each questions was answered until there were no additional questions. Today, the patient had all of her questions answered and the decision was made today that the patient's preoperative evaluation is acceptable for them  to proceed with bariatric surgery  choosing adjustable gastric band as her surgical option. The patient understands the plan of action    Her post-op meds have been sent to her pharmacy     Secondary Diagnoses:     DVT / Pulmonary Embolus Risk - The patient is at a higher risk for post operative DVT / pulmonary embolus secondary to their morbid obese status, relative sedentary lifestyle, and impending general anesthetic. We will plan to use anticoagulation therapy pre and post operative as well as  pneumatic compression devices and encourage ambulation once on the hospital nursing floor.   The need for possible at home anticoagulation therapy has also been discussed and any decision on this matter will be made during post operative evaluations. The patient understands that their efforts at ambulation are of vital importance to reduce the risk of this complication thus placing significant burden on them as to the prevention of such issues.  Signs and symptoms of DVT / PE have been discussed with the patient and they have been instructed to call the office if any these occur in the \"at home\" post op phase      Signed By: Kirsty Morse MD     August 23, 2021

## 2021-08-30 ENCOUNTER — TELEPHONE (OUTPATIENT)
Dept: SURGERY | Age: 31
End: 2021-08-30

## 2021-08-30 ENCOUNTER — HOSPITAL ENCOUNTER (OUTPATIENT)
Dept: PREADMISSION TESTING | Age: 31
Discharge: HOME | End: 2021-08-30
Payer: COMMERCIAL

## 2021-08-30 PROCEDURE — U0003 INFECTIOUS AGENT DETECTION BY NUCLEIC ACID (DNA OR RNA); SEVERE ACUTE RESPIRATORY SYNDROME CORONAVIRUS 2 (SARS-COV-2) (CORONAVIRUS DISEASE [COVID-19]), AMPLIFIED PROBE TECHNIQUE, MAKING USE OF HIGH THROUGHPUT TECHNOLOGIES AS DESCRIBED BY CMS-2020-01-R: HCPCS

## 2021-08-30 NOTE — TELEPHONE ENCOUNTER
RN spoke with patient pre-operatively to review pre-op education. Patient received emailed education to include the following: bariatric book, liquid diet slides, medical presentation, and nutritional presentation. Patient was able to view. Questions were answered in their entirety. Patient was reminded of a clear liquid diet the day before surgery and choices were reviewed, as well as nothing to eat and/or drink after midnight the day before, with the exception of a little bit of water with any medications that may need to be taken the morning of surgery. Patient verbalized understanding. Patient was given a bariatric book during pre-op appointment; electronic book sent, as well. Lovenox prescription: sent electronically  Percocet prescription: Dr. Pedro Castrejon will send electronically day of discharge  Prilosec: Patient informed to purchase a two month supply. Children's complete chewable multi-vitamin: Patient informed to purchase. Probiotic: Patient informed to purchase at least a 1 billion strain. Patient was encouraged to contact the office with further questions.

## 2021-08-31 LAB — SARS-COV-2, NAA: NOT DETECTED

## 2021-09-01 ENCOUNTER — ANESTHESIA EVENT (OUTPATIENT)
Dept: SURGERY | Age: 31
End: 2021-09-01
Payer: COMMERCIAL

## 2021-09-02 ENCOUNTER — ANESTHESIA (OUTPATIENT)
Dept: SURGERY | Age: 31
End: 2021-09-02
Payer: COMMERCIAL

## 2021-09-02 ENCOUNTER — HOSPITAL ENCOUNTER (OUTPATIENT)
Age: 31
Setting detail: OUTPATIENT SURGERY
Discharge: HOME | End: 2021-09-02
Attending: SPECIALIST | Admitting: SPECIALIST
Payer: COMMERCIAL

## 2021-09-02 VITALS
HEART RATE: 88 BPM | HEIGHT: 66 IN | BODY MASS INDEX: 45.24 KG/M2 | WEIGHT: 281.5 LBS | OXYGEN SATURATION: 97 % | SYSTOLIC BLOOD PRESSURE: 110 MMHG | TEMPERATURE: 97.4 F | DIASTOLIC BLOOD PRESSURE: 64 MMHG | RESPIRATION RATE: 15 BRPM

## 2021-09-02 DIAGNOSIS — K76.0 NAFLD (NONALCOHOLIC FATTY LIVER DISEASE): ICD-10-CM

## 2021-09-02 DIAGNOSIS — K66.0 PERITONEAL ADHESION: ICD-10-CM

## 2021-09-02 DIAGNOSIS — G89.18 POST-OP PAIN: Primary | ICD-10-CM

## 2021-09-02 DIAGNOSIS — E66.01 MORBID OBESITY WITH BMI OF 40.0-44.9, ADULT (HCC): ICD-10-CM

## 2021-09-02 LAB — HCG UR QL: NEGATIVE

## 2021-09-02 PROCEDURE — 76060000033 HC ANESTHESIA 1 TO 1.5 HR: Performed by: SPECIALIST

## 2021-09-02 PROCEDURE — 77030037283 HC BND LAPSCP GAST ADJ RPDPRT APOL -I2: Performed by: SPECIALIST

## 2021-09-02 PROCEDURE — 76210000017 HC OR PH I REC 1.5 TO 2 HR: Performed by: SPECIALIST

## 2021-09-02 PROCEDURE — 74011000258 HC RX REV CODE- 258: Performed by: ANESTHESIOLOGY

## 2021-09-02 PROCEDURE — 77030027138 HC INCENT SPIROMETER -A: Performed by: SPECIALIST

## 2021-09-02 PROCEDURE — 74011250636 HC RX REV CODE- 250/636: Performed by: NURSE ANESTHETIST, CERTIFIED REGISTERED

## 2021-09-02 PROCEDURE — 74011250637 HC RX REV CODE- 250/637: Performed by: SPECIALIST

## 2021-09-02 PROCEDURE — 74011250636 HC RX REV CODE- 250/636: Performed by: SPECIALIST

## 2021-09-02 PROCEDURE — 43770 LAP PLACE GASTR ADJ DEVICE: CPT | Performed by: SPECIALIST

## 2021-09-02 PROCEDURE — 74011000250 HC RX REV CODE- 250: Performed by: NURSE ANESTHETIST, CERTIFIED REGISTERED

## 2021-09-02 PROCEDURE — 77030003578 HC NDL INSUF VERES AMR -B: Performed by: SPECIALIST

## 2021-09-02 PROCEDURE — 77030040361 HC SLV COMPR DVT MDII -B: Performed by: SPECIALIST

## 2021-09-02 PROCEDURE — 88307 TISSUE EXAM BY PATHOLOGIST: CPT

## 2021-09-02 PROCEDURE — 74011000250 HC RX REV CODE- 250: Performed by: SPECIALIST

## 2021-09-02 PROCEDURE — 74011250636 HC RX REV CODE- 250/636: Performed by: ANESTHESIOLOGY

## 2021-09-02 PROCEDURE — 76210000021 HC REC RM PH II 0.5 TO 1 HR: Performed by: SPECIALIST

## 2021-09-02 PROCEDURE — 77030008602 HC TRCR ENDOSC EPATH J&J -B: Performed by: SPECIALIST

## 2021-09-02 PROCEDURE — 77030010030: Performed by: SPECIALIST

## 2021-09-02 PROCEDURE — 77030005537 HC CATH URETH BARD -A: Performed by: SPECIALIST

## 2021-09-02 PROCEDURE — 76010000149 HC OR TIME 1 TO 1.5 HR: Performed by: SPECIALIST

## 2021-09-02 PROCEDURE — 77030020782 HC GWN BAIR PAWS FLX 3M -B: Performed by: SPECIALIST

## 2021-09-02 PROCEDURE — 2709999900 HC NON-CHARGEABLE SUPPLY: Performed by: SPECIALIST

## 2021-09-02 PROCEDURE — 77030037281 HC APPL EZ PRT LAP BND RPDPRT APOL -C: Performed by: SPECIALIST

## 2021-09-02 PROCEDURE — 77030002912 HC SUT ETHBND J&J -A: Performed by: SPECIALIST

## 2021-09-02 PROCEDURE — 81025 URINE PREGNANCY TEST: CPT

## 2021-09-02 PROCEDURE — 77030002933 HC SUT MCRYL J&J -A: Performed by: SPECIALIST

## 2021-09-02 PROCEDURE — 47379 UNLISTED LAPS PX LIVER: CPT | Performed by: SPECIALIST

## 2021-09-02 PROCEDURE — 88313 SPECIAL STAINS GROUP 2: CPT

## 2021-09-02 PROCEDURE — 77030008603 HC TRCR ENDOSC EPATH J&J -C: Performed by: SPECIALIST

## 2021-09-02 DEVICE — THE LAP-BAND AP ADJUSTABLE GASTRIC BANDING SYSTEM WITH RAPIDPORT EZ
Type: IMPLANTABLE DEVICE | Site: STOMACH | Status: FUNCTIONAL
Brand: LAP-BAND

## 2021-09-02 RX ORDER — KETOROLAC TROMETHAMINE 15 MG/ML
INJECTION, SOLUTION INTRAMUSCULAR; INTRAVENOUS AS NEEDED
Status: DISCONTINUED | OUTPATIENT
Start: 2021-09-02 | End: 2021-09-02 | Stop reason: HOSPADM

## 2021-09-02 RX ORDER — FLUMAZENIL 0.1 MG/ML
0.2 INJECTION INTRAVENOUS
Status: DISCONTINUED | OUTPATIENT
Start: 2021-09-02 | End: 2021-09-02 | Stop reason: HOSPADM

## 2021-09-02 RX ORDER — MAGNESIUM SULFATE 100 %
4 CRYSTALS MISCELLANEOUS AS NEEDED
Status: DISCONTINUED | OUTPATIENT
Start: 2021-09-02 | End: 2021-09-02 | Stop reason: HOSPADM

## 2021-09-02 RX ORDER — SODIUM CHLORIDE, SODIUM LACTATE, POTASSIUM CHLORIDE, CALCIUM CHLORIDE 600; 310; 30; 20 MG/100ML; MG/100ML; MG/100ML; MG/100ML
1000 INJECTION, SOLUTION INTRAVENOUS CONTINUOUS
Status: DISCONTINUED | OUTPATIENT
Start: 2021-09-02 | End: 2021-09-02 | Stop reason: HOSPADM

## 2021-09-02 RX ORDER — FENTANYL CITRATE 50 UG/ML
25 INJECTION, SOLUTION INTRAMUSCULAR; INTRAVENOUS AS NEEDED
Status: DISCONTINUED | OUTPATIENT
Start: 2021-09-02 | End: 2021-09-02 | Stop reason: HOSPADM

## 2021-09-02 RX ORDER — FENTANYL CITRATE 50 UG/ML
50 INJECTION, SOLUTION INTRAMUSCULAR; INTRAVENOUS
Status: DISCONTINUED | OUTPATIENT
Start: 2021-09-02 | End: 2021-09-02 | Stop reason: HOSPADM

## 2021-09-02 RX ORDER — DEXTROSE 50 % IN WATER (D50W) INTRAVENOUS SYRINGE
25-50 AS NEEDED
Status: DISCONTINUED | OUTPATIENT
Start: 2021-09-02 | End: 2021-09-02 | Stop reason: HOSPADM

## 2021-09-02 RX ORDER — ENOXAPARIN SODIUM 100 MG/ML
40 INJECTION SUBCUTANEOUS
Status: COMPLETED | OUTPATIENT
Start: 2021-09-02 | End: 2021-09-02

## 2021-09-02 RX ORDER — GLYCOPYRROLATE 0.2 MG/ML
INJECTION INTRAMUSCULAR; INTRAVENOUS AS NEEDED
Status: DISCONTINUED | OUTPATIENT
Start: 2021-09-02 | End: 2021-09-02 | Stop reason: HOSPADM

## 2021-09-02 RX ORDER — ACETAMINOPHEN 500 MG
1000 TABLET ORAL ONCE
Status: COMPLETED | OUTPATIENT
Start: 2021-09-02 | End: 2021-09-02

## 2021-09-02 RX ORDER — PROMETHAZINE HYDROCHLORIDE 25 MG/1
25 TABLET ORAL
Qty: 30 TABLET | Refills: 1 | Status: SHIPPED | OUTPATIENT
Start: 2021-09-02 | End: 2021-09-15 | Stop reason: SDUPTHER

## 2021-09-02 RX ORDER — ONDANSETRON 2 MG/ML
INJECTION INTRAMUSCULAR; INTRAVENOUS AS NEEDED
Status: DISCONTINUED | OUTPATIENT
Start: 2021-09-02 | End: 2021-09-02 | Stop reason: HOSPADM

## 2021-09-02 RX ORDER — LIDOCAINE HYDROCHLORIDE 20 MG/ML
INJECTION, SOLUTION EPIDURAL; INFILTRATION; INTRACAUDAL; PERINEURAL AS NEEDED
Status: DISCONTINUED | OUTPATIENT
Start: 2021-09-02 | End: 2021-09-02 | Stop reason: HOSPADM

## 2021-09-02 RX ORDER — SODIUM CHLORIDE, SODIUM LACTATE, POTASSIUM CHLORIDE, CALCIUM CHLORIDE 600; 310; 30; 20 MG/100ML; MG/100ML; MG/100ML; MG/100ML
125 INJECTION, SOLUTION INTRAVENOUS CONTINUOUS
Status: DISCONTINUED | OUTPATIENT
Start: 2021-09-02 | End: 2021-09-02 | Stop reason: HOSPADM

## 2021-09-02 RX ORDER — SODIUM CHLORIDE 0.9 % (FLUSH) 0.9 %
5-40 SYRINGE (ML) INJECTION EVERY 8 HOURS
Status: DISCONTINUED | OUTPATIENT
Start: 2021-09-02 | End: 2021-09-02 | Stop reason: HOSPADM

## 2021-09-02 RX ORDER — HYDROMORPHONE HYDROCHLORIDE 2 MG/ML
INJECTION, SOLUTION INTRAMUSCULAR; INTRAVENOUS; SUBCUTANEOUS AS NEEDED
Status: DISCONTINUED | OUTPATIENT
Start: 2021-09-02 | End: 2021-09-02 | Stop reason: HOSPADM

## 2021-09-02 RX ORDER — ROCURONIUM BROMIDE 10 MG/ML
INJECTION, SOLUTION INTRAVENOUS AS NEEDED
Status: DISCONTINUED | OUTPATIENT
Start: 2021-09-02 | End: 2021-09-02 | Stop reason: HOSPADM

## 2021-09-02 RX ORDER — NYSTATIN 100000 [USP'U]/ML
500000 SUSPENSION ORAL
Status: COMPLETED | OUTPATIENT
Start: 2021-09-02 | End: 2021-09-02

## 2021-09-02 RX ORDER — FAMOTIDINE 20 MG/50ML
20 INJECTION, SOLUTION INTRAVENOUS
Status: DISCONTINUED | OUTPATIENT
Start: 2021-09-02 | End: 2021-09-02 | Stop reason: ALTCHOICE

## 2021-09-02 RX ORDER — BUPIVACAINE HYDROCHLORIDE AND EPINEPHRINE 2.5; 5 MG/ML; UG/ML
INJECTION, SOLUTION EPIDURAL; INFILTRATION; INTRACAUDAL; PERINEURAL AS NEEDED
Status: DISCONTINUED | OUTPATIENT
Start: 2021-09-02 | End: 2021-09-02 | Stop reason: HOSPADM

## 2021-09-02 RX ORDER — MIDAZOLAM HYDROCHLORIDE 1 MG/ML
INJECTION, SOLUTION INTRAMUSCULAR; INTRAVENOUS AS NEEDED
Status: DISCONTINUED | OUTPATIENT
Start: 2021-09-02 | End: 2021-09-02 | Stop reason: HOSPADM

## 2021-09-02 RX ORDER — SUCCINYLCHOLINE CHLORIDE 100 MG/5ML
SYRINGE (ML) INTRAVENOUS AS NEEDED
Status: DISCONTINUED | OUTPATIENT
Start: 2021-09-02 | End: 2021-09-02 | Stop reason: HOSPADM

## 2021-09-02 RX ORDER — FENTANYL CITRATE 50 UG/ML
INJECTION, SOLUTION INTRAMUSCULAR; INTRAVENOUS AS NEEDED
Status: DISCONTINUED | OUTPATIENT
Start: 2021-09-02 | End: 2021-09-02 | Stop reason: SDUPTHER

## 2021-09-02 RX ORDER — OXYCODONE AND ACETAMINOPHEN 5; 325 MG/1; MG/1
1 TABLET ORAL
Qty: 30 TABLET | Refills: 0 | Status: SHIPPED | OUTPATIENT
Start: 2021-09-02 | End: 2021-09-07

## 2021-09-02 RX ORDER — SODIUM CHLORIDE 0.9 % (FLUSH) 0.9 %
5-40 SYRINGE (ML) INJECTION AS NEEDED
Status: DISCONTINUED | OUTPATIENT
Start: 2021-09-02 | End: 2021-09-02 | Stop reason: HOSPADM

## 2021-09-02 RX ORDER — NEOSTIGMINE METHYLSULFATE 1 MG/ML
INJECTION, SOLUTION INTRAVENOUS AS NEEDED
Status: DISCONTINUED | OUTPATIENT
Start: 2021-09-02 | End: 2021-09-02 | Stop reason: HOSPADM

## 2021-09-02 RX ORDER — NALOXONE HYDROCHLORIDE 0.4 MG/ML
0.1 INJECTION, SOLUTION INTRAMUSCULAR; INTRAVENOUS; SUBCUTANEOUS AS NEEDED
Status: DISCONTINUED | OUTPATIENT
Start: 2021-09-02 | End: 2021-09-02 | Stop reason: HOSPADM

## 2021-09-02 RX ORDER — PROPOFOL 10 MG/ML
INJECTION, EMULSION INTRAVENOUS AS NEEDED
Status: DISCONTINUED | OUTPATIENT
Start: 2021-09-02 | End: 2021-09-02 | Stop reason: HOSPADM

## 2021-09-02 RX ADMIN — ENOXAPARIN SODIUM 40 MG: 40 INJECTION SUBCUTANEOUS at 08:20

## 2021-09-02 RX ADMIN — ROCURONIUM BROMIDE 5 MG: 10 INJECTION, SOLUTION INTRAVENOUS at 09:43

## 2021-09-02 RX ADMIN — Medication 4 MG: at 10:41

## 2021-09-02 RX ADMIN — ACETAMINOPHEN 1000 MG: 500 TABLET ORAL at 08:19

## 2021-09-02 RX ADMIN — ONDANSETRON HYDROCHLORIDE 4 MG: 2 INJECTION INTRAMUSCULAR; INTRAVENOUS at 10:23

## 2021-09-02 RX ADMIN — FAMOTIDINE 20 MG: 10 INJECTION, SOLUTION INTRAVENOUS at 08:20

## 2021-09-02 RX ADMIN — MIDAZOLAM 2 MG: 1 INJECTION INTRAMUSCULAR; INTRAVENOUS at 09:38

## 2021-09-02 RX ADMIN — Medication 190 MG: at 09:44

## 2021-09-02 RX ADMIN — SODIUM CHLORIDE, SODIUM LACTATE, POTASSIUM CHLORIDE, AND CALCIUM CHLORIDE 1000 ML: 600; 310; 30; 20 INJECTION, SOLUTION INTRAVENOUS at 12:00

## 2021-09-02 RX ADMIN — PROMETHAZINE HYDROCHLORIDE 6.25 MG: 25 INJECTION INTRAMUSCULAR; INTRAVENOUS at 11:47

## 2021-09-02 RX ADMIN — KETOROLAC TROMETHAMINE 30 MG: 15 INJECTION, SOLUTION INTRAMUSCULAR; INTRAVENOUS at 10:24

## 2021-09-02 RX ADMIN — ROCURONIUM BROMIDE 20 MG: 10 INJECTION, SOLUTION INTRAVENOUS at 09:49

## 2021-09-02 RX ADMIN — GLYCOPYRROLATE 0.8 MG: 0.2 INJECTION INTRAMUSCULAR; INTRAVENOUS at 10:41

## 2021-09-02 RX ADMIN — PROPOFOL 250 MG: 10 INJECTION, EMULSION INTRAVENOUS at 09:44

## 2021-09-02 RX ADMIN — FENTANYL CITRATE 100 MCG: 50 INJECTION, SOLUTION INTRAMUSCULAR; INTRAVENOUS at 09:42

## 2021-09-02 RX ADMIN — LIDOCAINE HYDROCHLORIDE 100 MG: 20 INJECTION, SOLUTION INTRAVENOUS at 09:44

## 2021-09-02 RX ADMIN — HYDROMORPHONE HYDROCHLORIDE 1 MG: 2 INJECTION, SOLUTION INTRAMUSCULAR; INTRAVENOUS; SUBCUTANEOUS at 10:25

## 2021-09-02 RX ADMIN — NYSTATIN 500000 UNITS: 100000 SUSPENSION ORAL at 08:20

## 2021-09-02 RX ADMIN — FENTANYL CITRATE 50 MCG: 50 INJECTION, SOLUTION INTRAMUSCULAR; INTRAVENOUS at 12:59

## 2021-09-02 RX ADMIN — CEFAZOLIN 3 G: 1 INJECTION, POWDER, FOR SOLUTION INTRAVENOUS at 09:47

## 2021-09-02 RX ADMIN — SODIUM CHLORIDE, SODIUM LACTATE, POTASSIUM CHLORIDE, AND CALCIUM CHLORIDE 125 ML/HR: 600; 310; 30; 20 INJECTION, SOLUTION INTRAVENOUS at 08:49

## 2021-09-02 NOTE — DISCHARGE INSTRUCTIONS
Call Dr Nicole Kyle office with questions and concerns. Follow instructions provided by Dr Lavonne Fiore for Gastric Banding  Medicine cups provided    DISCHARGE SUMMARY from Nurse    PATIENT INSTRUCTIONS:    After general anesthesia or intravenous sedation, for 24 hours or while taking prescription Narcotics:  · Limit your activities  · Do not drive and operate hazardous machinery  · Do not make important personal or business decisions  · Do  not drink alcoholic beverages  · If you have not urinated within 8 hours after discharge, please contact your surgeon on call. Report the following to your surgeon:  · Excessive pain, swelling, redness or odor of or around the surgical area  · Temperature over 100.5  · Nausea and vomiting lasting longer than 4 hours or if unable to take medications  · Any signs of decreased circulation or nerve impairment to extremity: change in color, persistent  numbness, tingling, coldness or increase pain  · Any questions    What to do at Home:  Recommended activity: Activity as tolerated      *  Please give a list of your current medications to your Primary Care Provider. *  Please update this list whenever your medications are discontinued, doses are      changed, or new medications (including over-the-counter products) are added. *  Please carry medication information at all times in case of emergency situations. These are general instructions for a healthy lifestyle:    No smoking/ No tobacco products/ Avoid exposure to second hand smoke  Surgeon General's Warning:  Quitting smoking now greatly reduces serious risk to your health.     Obesity, smoking, and sedentary lifestyle greatly increases your risk for illness    A healthy diet, regular physical exercise & weight monitoring are important for maintaining a healthy lifestyle    You may be retaining fluid if you have a history of heart failure or if you experience any of the following symptoms:  Weight gain of 3 pounds or more overnight or 5 pounds in a week, increased swelling in our hands or feet or shortness of breath while lying flat in bed. Please call your doctor as soon as you notice any of these symptoms; do not wait until your next office visit. The discharge information has been reviewed with the patient and caregiver. The patient and caregiver verbalized understanding. Discharge medications reviewed with the patient and caregiver and appropriate educational materials and side effects teaching were provided. Learning About Coronavirus [phone])  Coronavirus [phone]): Overview  What is coronavirus (COVID-19)? The coronavirus disease (COVID-19) is caused by a virus. It is an illness that was first found in [place], [place], in December 2019. It has since spread worldwide. The virus can cause fever, cough, and trouble breathing. In severe cases, it can cause pneumonia and make it hard to breathe without help. It can cause death. Coronaviruses are a large group of viruses. They cause the common cold. They also cause more serious illnesses like Middle East respiratory syndrome (MERS) and severe acute respiratory syndrome (SARS). COVID-19 is caused by a novel coronavirus. That means it's a new type that has not been seen in people before. This virus spreads person-to-person through droplets from coughing and sneezing. It can also spread when you are close to someone who is infected. And it can spread when you touch something that has the virus on it, such as a doorknob or a tabletop. What can you do to protect yourself from coronavirus (COVID-19)? The best way to protect yourself from getting sick is to:  · Avoid areas where there is an outbreak. · Avoid contact with people who may be infected. · Wash your hands often with soap or alcohol-based hand sanitizers. · Avoid crowds and try to stay at least 6 feet away from other people. · Wash your hands often, especially after you cough or sneeze.  Use soap and water, and scrub for at least 20 seconds. If soap and water aren't available, use an alcohol-based hand . · Avoid touching your mouth, nose, and eyes. What can you do to avoid spreading the virus to others? To help avoid spreading the virus to others:  · Cover your mouth with a tissue when you cough or sneeze. Then throw the tissue in the trash. · Use a disinfectant to clean things that you touch often. · Stay home if you are sick or have been exposed to the virus. Don't go to school, work, or public areas. And don't use public transportation. · If you are sick:  ? Leave your home only if you need to get medical care. But call the doctor's office first so they know you're coming. And wear a face mask, if you have one.  ? If you have a face mask, wear it whenever you're around other people. It can help stop the spread of the virus when you cough or sneeze. ? Clean and disinfect your home every day. Use household  and disinfectant wipes or sprays. Take special care to clean things that you grab with your hands. These include doorknobs, remote controls, phones, and handles on your refrigerator and microwave. And don't forget countertops, tabletops, bathrooms, and computer keyboards. When to call for help  Call 911 anytime you think you may need emergency care. For example, call if:  · You have severe trouble breathing. (You can't talk at all.)  · You have constant chest pain or pressure. · You are severely dizzy or lightheaded. · You are confused or can't think clearly. · Your face and lips have a blue color. · You pass out (lose consciousness) or are very hard to wake up. Call your doctor now if you develop symptoms such as:  · Shortness of breath. · Fever. · Cough. If you need to get care, call ahead to the doctor's office for instructions before you go. Make sure you wear a face mask, if you have one, to prevent exposing other people to the virus.   Where can you get the latest information? The following health organizations are tracking and studying this virus. Their websites contain the most up-to-date information. [employer] also learn what to do if you think you may have been exposed to the virus. · U.S. Centers for Disease Control and Prevention (CDC): The CDC provides updated news about the disease and travel advice. The website also tells you how to prevent the spread of infection. www.cdc.gov  · World Health Organization [facility]): WHO offers information about the virus outbreaks. WHO also has travel advice. www.who.int  Current as of: April 1, 2020               Content Version: 12.4  © 3052-8933 [employer], Incorporated. Care instructions adapted under license by your healthcare professional. If you have questions about a medical condition or this instruction, always ask your healthcare professional. [address] any warranty or liability for your use of this information.     ___________________________________________________________________________________________________________________________________

## 2021-09-02 NOTE — INTERVAL H&P NOTE
Update History & Physical    The Patient's History and Physical of August 23, 2021 was reviewed with the patient and I examined the patient. There was no change. The surgical site was confirmed by the patient and me. Plan:  The risk, benefits, expected outcome, and alternative to the recommended procedure have been discussed with the patient. Patient understands and wants to proceed with the procedure.     Electronically signed by Sunitha Vega MD on 9/2/2021 at 9:25 AM

## 2021-09-02 NOTE — PERIOP NOTES
Assuming care of patient, report received. Patient doing well, VSS, no distress at this time. Dressings to abdomen remain clean, dry, and intact.

## 2021-09-02 NOTE — ANESTHESIA POSTPROCEDURE EVALUATION
Procedure(s):  LAPAROSCOPIC GASTRIC ADJUSTABLE BAND, LYSIS OF ADHESIONS AND  WEDGE LIVER BIOPSY. general    Anesthesia Post Evaluation        Comments: Post-Anesthesia Evaluation and Assessment    Cardiovascular Function/Vital Signs  /63   Pulse 77   Temp 37.1 °C (98.8 °F)   Resp 13   Ht 5' 6\" (1.676 m)   Wt 127.7 kg (281 lb 8 oz)   SpO2 96%   BMI 45.44 kg/m²     Patient is status post Procedure(s):  LAPAROSCOPIC GASTRIC ADJUSTABLE BAND, LYSIS OF ADHESIONS AND  WEDGE LIVER BIOPSY. Nausea/Vomiting: Controlled. Postoperative hydration reviewed and adequate. Pain:  Pain Scale 1: FLACC (09/02/21 1215)  Pain Intensity 1: 0 (09/02/21 1215)   Managed. Neurological Status:   Neuro (WDL): Exceptions to WDL (09/02/21 1054)   At baseline. Mental Status and Level of Consciousness: Arousable. Pulmonary Status:   O2 Device: None (Room air) (09/02/21 1200)   Adequate oxygenation and airway patent. Complications related to anesthesia: None    Post-anesthesia assessment completed. No concerns. Patient has met all discharge requirements. Signed By: Ashwin Redding MD    September 2, 2021                   INITIAL Post-op Vital signs:   Vitals Value Taken Time   /63 09/02/21 1215   Temp 37.1 °C (98.8 °F) 09/02/21 1054   Pulse 83 09/02/21 1229   Resp 13 09/02/21 1229   SpO2 96 % 09/02/21 1229   Vitals shown include unvalidated device data.

## 2021-09-02 NOTE — PERIOP NOTES
Discharge instructions given to caregiver. Opportunity for questions provided. No questions at this time. Caregiver Verbalizes understanding.

## 2021-09-02 NOTE — OP NOTES
[facility] [place]  OPERATIVE REPORT    Name:  Missy Adam  MR#:   [id number]  :  1990  ACCOUNT #:  [de-identified]  DATE OF SERVICE:  2021    PREOPERATIVE DIAGNOSIS:  Persistent morbid obesity BMI 43 status post laparoscopic gastric bypass procedure. POSTOPERATIVE DIAGNOSES:  1. Persistent morbid obesity BMI 43 status post laparoscopic gastric bypass procedure with extension of intra-abdominal adhesions. 2.  Severe fatty infiltration of liver. PROCEDURE PERFORMED:  1. Laparoscopic lysis of adhesions in excess of 20 minutes duration. 2.  Lap band placement with Lap-Band APS band. 3.  Wedge liver biopsy. SURGEON:  Eder Odell MD    ASSISTANT:  Alanna White PA-C MS. LEILA Biggs assisted with the procedure since there were no qualified surgeons, interns, or residents available. He assisted with adhesiolysis during the procedure, exposure during the procedure, closing the lap band and port, and closure of skin and fascial incisions. ANESTHESIA:  General endotracheal.    COMPLICATIONS:  None. SPECIMENS REMOVED:  Wedge liver biopsy specimen. IMPLANTS:  Lap-Band and port. ESTIMATED BLOOD LOSS:  None. STATEMENT OF MEDICAL NECESSITY:  The patient is a 61-year-old female who had a gastric bypass procedure performed   in the past.  We performed a revision on her and her weight loss was moderate at best.  She had some weight regain at that time and then re-presented to us for possible re-revision surgery. We performed an upper GI study on her. It showed a very small gastric pouch and I told her that the only option would be a band over bypass. She has chosen this at this juncture and has undergone a preoperative weight loss program in preparation for surgery. PROCEDURE:  The patient was brought to the operating room, placed on the table in supine position, at which time general anesthesia was administered without any difficulty.   Her abdomen was then prepped and draped in sterile fashion. Using a 15-blade, a 1-cm incision was made distal to the umbilicus. Veress needle approach was used to gain access to peritoneal cavity, which was then insufflated. Maximino Dolly was then placed at that site and then two trocars were placed in the right upper quadrant region and one in the left upper quadrant region. There were extensive intra-abdominal adhesions present from her multiple prior surgeries. Then we began adhesiolysis in the central abdomen first, then towards the left upper quadrant region. I freed up this adhesion sleeve and then I freed up the entire left subhepatic space all the way to the level of diaphragm. I could visualize the Maria Luz limb which coursed anterior to the gastric remnant. I then delineated the gastric pouch. There was no evidence of any hiatal hernia and I delineated the left crural region. At this juncture, I then turned my attention towards the lesser omentum, which I opened up. I was then able to identify the right crura in a pars flaccida technique. I was able to traverse the distance from the right crura towards the angle of His without any difficulty. I then prepared the [address] introducing intra-abdominally. I then placed tubing of the band within the grasper and brought the band in normal position and locked it in place. I then performed a plication of the band using gastric remnant to the gastric pouch using a single stitch. The band was in good position. I then removed the liver retractor, and due to her massive enlargement of the liver, I did biopsy in a wedge-shaped fashion and submitted to Pathology for permanent section. Once this was achieved, I then checked all areas for hemostasis. Trocars were then removed and the tubing was externalized via the right upper quadrant incision. This incision was then extended slightly exposing the fascia anteriorly.   Then, I attached the port to the tubing, then the port to the fascia using the applier device. With this having been completed, all areas were then irrigated using normal saline solution and all skin incisions were then closed using 4-0 subcuticular Monocryl. Steri-Strips and sterile dressings were applied. The patient tolerated the procedure well.       Rishabh Corbett MD      [id number]  D:  09/02/2021 14:44  T:  09/02/2021 19:55  JOB #:  [id number]

## 2021-09-02 NOTE — BRIEF OP NOTE
Brief Postoperative Note    Patient: Luis Angel Dooley  YOB: 1990  MRN: [id number]    Date of Procedure: 9/2/2021     Pre-Op Diagnosis: MORBID OBESITY,BMI 40    Post-Op Diagnosis: Same as preoperative diagnosis. Procedure(s):  LAPAROSCOPIC GASTRIC ADJUSTABLE BAND, LYSIS OF ADHESIONS AND  WEDGE LIVER BIOPSY    Surgeon(s):  Alesha Smart MD    Surgical Assistant: Physician Assistant: LIELA Palacios    Anesthesia: General     Estimated Blood Loss (mL): Minimal    Complications: None    Specimens:   ID Type Source Tests Collected by Time Destination   1 : WEDGE LIVER BIOPSY Preservative Liver  Alesha Smart MD 9/2/2021 1012 Pathology        Implants:   Implant Name Type Inv.  Item Serial No.  Lot No. LRB No. Used Action   BAND LAP AP SYS STD -- [employer] - [id number]  BAND LAP AP SYS STD -- UZE[employer] [id number] [employer]_  N/A 1 Implanted       Drains: * No LDAs found *    Findings: none    Electronically Signed by Sunitha Vega MD on 9/2/2021 at 10:42 AM

## 2021-09-02 NOTE — ANESTHESIA PREPROCEDURE EVALUATION
Anesthetic History   No history of anesthetic complications     Pertinent negatives: No PONV       Review of Systems / Medical History  Patient summary reviewed, nursing notes reviewed and pertinent labs reviewed    Pulmonary              Pertinent negatives: No COPD, asthma and smoker     Neuro/Psych         Psychiatric history    Comments: Patient only takes propanolol on occasion for anxiety. None In 3 days. Will give intra op if needed.    Cardiovascular  Within defined limits              Pertinent negatives: No hypertension, past MI and CAD  Exercise tolerance: >4 METS     GI/Hepatic/Renal  Within defined limits           Pertinent negatives: No GERD, liver disease and renal disease   Endo/Other        Morbid obesity  Pertinent negatives: No diabetes   Other Findings   Comments: -etoh          Physical Exam    Airway  Mallampati: II  TM Distance: 4 - 6 cm  Neck ROM: normal range of motion   Mouth opening: Normal     Cardiovascular    Rhythm: regular  Rate: normal         Dental  No notable dental hx       Pulmonary  Breath sounds clear to auscultation               Abdominal  GI exam deferred       Other Findings            Anesthetic Plan    ASA: 3  Anesthesia type: general          Induction: Intravenous  Anesthetic plan and risks discussed with: Patient

## 2021-09-08 ENCOUNTER — APPOINTMENT (OUTPATIENT)
Dept: SURGERY | Age: 31
End: 2021-09-08

## 2021-09-08 ENCOUNTER — APPOINTMENT (OUTPATIENT)
Dept: GENERAL RADIOLOGY | Age: 31
End: 2021-09-08
Attending: SPECIALIST
Payer: COMMERCIAL

## 2021-09-08 ENCOUNTER — HOSPITAL ENCOUNTER (OUTPATIENT)
Age: 31
Setting detail: OUTPATIENT SURGERY
Discharge: HOME | End: 2021-09-08
Attending: SPECIALIST | Admitting: SPECIALIST
Payer: COMMERCIAL

## 2021-09-08 VITALS
RESPIRATION RATE: 20 BRPM | HEART RATE: 110 BPM | SYSTOLIC BLOOD PRESSURE: 129 MMHG | HEIGHT: 66 IN | OXYGEN SATURATION: 100 % | TEMPERATURE: 96.9 F | DIASTOLIC BLOOD PRESSURE: 80 MMHG | BODY MASS INDEX: 43.87 KG/M2 | WEIGHT: 273 LBS

## 2021-09-08 DIAGNOSIS — R13.10 DYSPHAGIA, UNSPECIFIED TYPE: ICD-10-CM

## 2021-09-08 DIAGNOSIS — E66.01 MORBID OBESITY (HCC): ICD-10-CM

## 2021-09-08 PROCEDURE — 74240 X-RAY XM UPR GI TRC 1CNTRST: CPT

## 2021-09-08 PROCEDURE — 74240 X-RAY XM UPR GI TRC 1CNTRST: CPT | Performed by: SPECIALIST

## 2021-09-08 PROCEDURE — 74011000250 HC RX REV CODE- 250: Performed by: SPECIALIST

## 2021-09-08 PROCEDURE — 76040000019: Performed by: SPECIALIST

## 2021-09-09 NOTE — PROCEDURES
[facility]    Upper GI Procedure Report      Les Heard  MR# [id number]  1990  Date of Service - 9/8/2021    Pre-Op Diagnosis - patient is status post band over bypass performed by myself 6 days ago with need for post-op UGI. They now present for UGI to assess their post surgical anatomy. Post-Op Diagnosis -same    Procedure - UGI study with barium    Surgeon - Tashi Black MD    Assistant - None    Complications - None    Specimens - None    Implants - None    Estimate Blood Loss - None    Statement of Medical Necessity - Need for radiologic evaluation post recent band over bypass. Procedure -the patient was brought to the fluoroscopy suite where they were given thin barium. On swallowing the barium the patient was noted to have normal peristalsis of their esophagus with progressive flow into the distal esophagus. Specific findings of the distal esophagus revealed that they did not have a hiatal hernia. Contrast then flowed into the gastric cardia with the following findings: Contrast flowed easily through the esophagus and into the proximal pouch before flowing through an appropriately placed gastric band without obstruction. The contrast flowed through the remainder of the pouch and into the alethea limb without issue. It was a normal post-op band over bypass UGI. Minnie Black MD

## 2021-09-14 ENCOUNTER — TELEPHONE (OUTPATIENT)
Dept: SURGERY | Age: 31
End: 2021-09-14

## 2021-09-14 ENCOUNTER — DOCUMENTATION ONLY (OUTPATIENT)
Dept: SURGERY | Age: 31
End: 2021-09-14

## 2021-09-14 NOTE — PROGRESS NOTES
Spoke with Betty Casper  today. Tolerating liquid diet very well. Protein shake intake: 2 Premier protein shakes/day. Fluid intake: \"I try to get about 30 oz/day\". Foods being consumed include yogurt, SF pudding, and SF cool whip. No complaints. Encouraged pt that although the SF cool Whip only has 20 calories, it is only a \"free food\" if she consumes 1 serving/day (2Tb). Wt: 266 lbs, stated today by pt    Activity: walking over 30 min/d for the past 6 days. Supplements:  [x] Flinstone's Complete Chewable MVI BID  [x] Probiotic QD  []Prilosec every day, pt reports that she will start taking today. Pt given one on one diet education over the phone. Reviewed diet progression for weeks 3-4. Patient appears to have a good understanding of the diet progression, food choices, and dietary/exercise habits for successful weight loss and nourishment after surgery. Reviewed pp. 32-45 of the patient education book. Discussed: post-op diet progression, including soft/pureed high protein, low fat, low sugar food recommendations; proper food group choices;  consumption of food and liquids, and consumption of adequate no-sugar, no-caffeine, no carbonation liquids. We reviewed appropriate food choices, meal adaptations (use of smaller dishes/utensils, eat slowly and chewing sufficiently for digestion), cooking techniques, and eating behavior modifications. Discussed intake regimen with 3 meals and 2-3 protein supplements per day. Additionally, intake timing - to include consuming a meal or snack every 3-4 hrs, the 30:30 rule, and having a meal within 2 hours of waking . Reinforced the importance of continued vitamin & probiotic consumption, adequate fluid with goal of 64 oz per day and adequate protein with goal of  grams per day.  Stressed the importance of 30 min of physical activity each day, as tolerated, with restricted lifting, to improve post op weight loss results and bowel function. Pt voiced understanding through repeating the information back to me. Pt had no questions or concerns at this time. Reminded pt that I would be calling them again at their 1 mo. jeimy. Pt provided with RD contact information for nutritional questions or concerns between now and then.     RD: Karen Espinal MS, RD

## 2021-09-15 PROBLEM — Z98.84 HX OF LAPAROSCOPIC ADJUSTABLE GASTRIC BANDING: Status: ACTIVE | Noted: 2021-09-15

## 2021-09-29 ENCOUNTER — TELEPHONE (OUTPATIENT)
Dept: SURGERY | Age: 31
End: 2021-09-29

## 2021-09-29 NOTE — TELEPHONE ENCOUNTER
RN returned patient's message re: soreness at port site. She stated it is mainly with movement, such as sitting or bending. She stated there is an area under the skin that is tougher. RN feels it is a seroma, as patient does not experience pain when pressing on it. RN recommended a heating pad and allowing the hot water to drain on it. Patient started using the heating pad last night. Patient was advised to contact the office should the pain worsen or the mass not dissolve, and she verbalized understanding.

## 2021-10-07 ENCOUNTER — NURSE NAVIGATOR (OUTPATIENT)
Dept: SURGERY | Age: 31
End: 2021-10-07

## 2021-10-07 ENCOUNTER — TELEPHONE (OUTPATIENT)
Dept: SURGERY | Age: 31
End: 2021-10-07

## 2021-10-07 VITALS — BODY MASS INDEX: 42.11 KG/M2 | WEIGHT: 262 LBS | HEIGHT: 66 IN

## 2021-10-07 NOTE — TELEPHONE ENCOUNTER
Patient is currently on a regular diet without difficulty. Patient is hydrating with 48+ ounces, daily. Patient is tolerating the following sources of protein: one protein shake daily, chicken, eggs, crab, and cheese. Patient is exercising by walking three to four days weekly, one to two hours each day. She is getting a [employer]. Patient has not had any readmissions, reoperations, complications, ED visits, nor IVF at [facility] during her first post-op month. Current weight: 262 lbs. Patient was reminded of the followin. No need for Ursodiol  2. Patient is cleared to return to work without restrictions. 3. Can stop probiotic    4. Reminded to measure portions, continue high protein, low carbohydrate diet. Reminded to eat regularly, to eat slowly & not to drink with meals. Refer to the handbook and video. 5. Continue multi-vitamin with iron and add the additional vitamin supplementation (calcium citrate 1,500 mg per day taken one hour apart from your other vitamins/supplements, vitamin B complex one a day, vitamin B12 1,000 mcg daily taken sublingually, vitamin D3 3,000 IU per day, all listed in handbook)  6. Continue current medications and follow up with PCP for management of regimen  7. Continue cardio exercise and add resistance exercises. Discussed with patient. Minimum of 30 minutes of exercise daily, five days a week  8. Encouraged to attend support group   9. I have discussed this plan with patient, and they verbalized understanding.   10. Follow-up appointment confirmed

## 2021-10-13 ENCOUNTER — OFFICE VISIT (OUTPATIENT)
Dept: SURGERY | Age: 31
End: 2021-10-13
Payer: COMMERCIAL

## 2021-10-13 VITALS
HEART RATE: 77 BPM | RESPIRATION RATE: 16 BRPM | SYSTOLIC BLOOD PRESSURE: 125 MMHG | WEIGHT: 263.4 LBS | OXYGEN SATURATION: 100 % | BODY MASS INDEX: 42.33 KG/M2 | DIASTOLIC BLOOD PRESSURE: 60 MMHG | HEIGHT: 66 IN | TEMPERATURE: 97.7 F

## 2021-10-13 DIAGNOSIS — Z98.84 STATUS POST GASTRIC BANDING: Primary | ICD-10-CM

## 2021-10-13 PROCEDURE — 99213 OFFICE O/P EST LOW 20 MIN: CPT | Performed by: SPECIALIST

## 2021-10-13 PROCEDURE — S2083 ADJUSTMENT GASTRIC BAND: HCPCS | Performed by: SPECIALIST

## 2021-10-13 NOTE — PROGRESS NOTES
Subjective:     Ashley Anders  is a 35 y.o. female who presents for follow-up about 6 weeks following band over bypass. She has lost a total of 21 pounds since surgery. Body mass index is 42.51 kg/m². Calvin Ra Pain assessment - 0/10    Surgery related complication: NA       She reports no real issues and denies vomiting, abdominal pain, diarrhea and difficulty breathing. The patient's exercise level: moderately active. Changes in her medical history and medications have been reviewed. Patient Active Problem List   Diagnosis Code    H/O gastric bypass Z98.84    Iron deficiency anemia secondary to inadequate dietary iron intake D50.8    Morbid obesity with BMI of 40.0-44.9, adult ([facility] E66.01, Z68.41    Morbid obesity ([facility] E66.01    Depression F32. A    Borderline diabetes R73.03    Intestinal malabsorption K90.9    Psychotic disorder (HCC) F29    Status post gastric bypass for obesity Z98.84    Anemia D64.9    Morbid obesity with body mass index (BMI) of 40.0 to 49.9 (HCC) E66.01    Anxiety F41.9    Rosacea, acne L71.9    Hx of laparoscopic adjustable gastric banding Z98.84     Past Medical History:   Diagnosis Date    Anemia     has required iron infusions    Anxiety     Depression     Intestinal malabsorption     Morbid obesity ([address]     Morbid obesity with body mass index (BMI) of 40.0 to 49.9 ([facility])     Rosacea, acne     Status post gastric bypass for obesity     2018 - [place] [facility]     Past Surgical History:   Procedure Laterality Date    HX  SECTION  2020    [facility]    HX GI  2018    [facility] / [facility]    HX TUBAL LIGATION      [place] LAP, PLACE ADJUST GASTR BAND  2021    Dr Zapata Stage       Objective:     Visit Vitals  /60 (BP 1 Location: Left upper arm, BP Patient Position: Sitting, BP Cuff Size: Large adult)   Pulse 77   Temp 97.7 °F (36.5 °C)   Resp 16   Ht 5' 6\" (1.676 m)   Wt 119.5 kg (263 lb 6.4 oz)   SpO2 100%   BMI 42.51 kg/m²        Physical Exam:    General:  alert, cooperative, no distress, appears stated age   Pulm: clear to auscultation bilaterally   Heart:  Regular rate and rhythm   Abdomen:   abdomen is soft without significant tenderness, masses, organomegaly or guarding; Incisions: healing well, no significant drainage       Assessment:     1. History of Morbid obesity, status post  band over a bypass revision ([facility] of a [facility]). The patient will get her first office adjustment today. Plan:     1. Remember to measure portions, continue low carbohydrate diet  2. See smart set for plan  3. Remember vitamin supplements. 4. Exercise regimen appears adequate. 5. Attend support group  6. Follow-up in 6 week(s) in the clinic.   7. The patient understands the plan of action      [address] [facility] NOTE        Chart reviewed for the following:   Swapna LAST PA, have reviewed the History, Physical and updated the Allergic reactions for Marc Nunez     TIME OUT performed immediately prior to start of procedure:   Swapna LAST PA, have performed the following reviews on Marc Nunez prior to the start of the procedure:            * Patient was identified by name and date of birth   * Agreement on procedure being performed was verified  * Risks and Benefits explained to the patient  * Procedure site verified and marked as necessary  * Patient was positioned for comfort  * Consent was signed and verified     Time: 0945      Date of procedure: 10/13/2021    Procedure performed by:  Prasanna Morris PA-C    Patient assisted by: self    How tolerated by patient: tolerated the procedure well with no complications    Post Procedural Pain Scale: 0 - No Hurt    Comments: none              Subjective:   Marc Nunez is a 35 y.o. female with previous lap band surgery, who is here today needing lap band adjustment because of early sense of hunger (within 1-2 hours after eating). Dietary compliance is fair. Objective:     Visit Vitals  /60 (BP 1 Location: Left upper arm, BP Patient Position: Sitting, BP Cuff Size: Large adult)   Pulse 77   Temp 97.7 °F (36.5 °C)   Resp 16   Ht 5' 6\" (1.676 m)   Wt 119.5 kg (263 lb 6.4 oz)   SpO2 100%   BMI 42.51 kg/m²      Estimated body mass index is 42.51 kg/m² as calculated from the following:    Height as of this encounter: 5' 6\" (1.676 m). Weight as of this encounter: 119.5 kg (263 lb 6.4 oz). Wt Readings from Last 3 Encounters:   10/13/21 119.5 kg (263 lb 6.4 oz)   10/07/21 118.8 kg (262 lb)   09/15/21 121.1 kg (267 lb)     Her change in weight since last visit: they have lost 20+ lbs    The surgical area looks well healed and the port is properly located. Band Position: NA. Assessment/Plan: Morbid Obesity, status post lap-band surgery, needing fill adjustment    Lap Band FiIl Procedure    The port area was cleaned with alcohol and a 25 gauge needle was inserted. Previous Fill Volume:  0 cc. Added:  2 cc   Total amount now in Band 2 ml    Patient tolerated the procedure well. Follow up in 4 to 6 weeks as needed.

## 2021-12-01 ENCOUNTER — APPOINTMENT (OUTPATIENT)
Dept: SURGERY | Age: 31
End: 2021-12-01

## 2021-12-01 ENCOUNTER — HOSPITAL ENCOUNTER (OUTPATIENT)
Age: 31
Setting detail: OUTPATIENT SURGERY
Discharge: HOME | End: 2021-12-01
Attending: SPECIALIST | Admitting: SPECIALIST
Payer: COMMERCIAL

## 2021-12-01 ENCOUNTER — APPOINTMENT (OUTPATIENT)
Dept: GENERAL RADIOLOGY | Age: 31
End: 2021-12-01
Attending: SPECIALIST
Payer: COMMERCIAL

## 2021-12-01 VITALS
BODY MASS INDEX: 41.06 KG/M2 | DIASTOLIC BLOOD PRESSURE: 79 MMHG | HEIGHT: 66 IN | HEART RATE: 84 BPM | SYSTOLIC BLOOD PRESSURE: 112 MMHG | TEMPERATURE: 97.1 F | WEIGHT: 255.5 LBS | OXYGEN SATURATION: 100 % | RESPIRATION RATE: 18 BRPM

## 2021-12-01 DIAGNOSIS — E66.01 MORBID OBESITY WITH BMI OF 40.0-44.9, ADULT (HCC): ICD-10-CM

## 2021-12-01 DIAGNOSIS — Z46.51 FITTING AND ADJUSTMENT OF GASTRIC LAP BAND: ICD-10-CM

## 2021-12-01 PROCEDURE — 43999 UNLISTED PROCEDURE STOMACH: CPT | Performed by: SPECIALIST

## 2021-12-01 PROCEDURE — 76000 FLUOROSCOPY <1 HR PHYS/QHP: CPT

## 2021-12-01 PROCEDURE — 74011000250 HC RX REV CODE- 250: Performed by: SPECIALIST

## 2021-12-01 PROCEDURE — S2083 ADJUSTMENT GASTRIC BAND: HCPCS | Performed by: SPECIALIST

## 2021-12-01 RX ORDER — LIDOCAINE HYDROCHLORIDE 10 MG/ML
INJECTION INFILTRATION; PERINEURAL AS NEEDED
Status: DISCONTINUED | OUTPATIENT
Start: 2021-12-01 | End: 2021-12-01 | Stop reason: HOSPADM

## 2021-12-01 NOTE — PROCEDURES
Lap Band Encounter (fluroscopy clinic)    Ashley Anders is gastric banding patient who had her procedure on  .  her weight today is 115.9 kg (255 lb 8 oz), which correlates to  % EBW loss. she is here today for Lap Band Adjustment / Fill with Fluoroscopy Guidance. she notes the following issues related to the banding procedure; - here for first fluro adjustment.       Surgery related complications; band over bypass    Visit Vitals  /79   Pulse 84   Temp 97.1 °F (36.2 °C)   Resp 18   Ht 5' 6\" (1.676 m)   Wt 115.9 kg (255 lb 8 oz)   SpO2 100%   BMI 41.24 kg/m²       Past Medical History:   Diagnosis Date    Anemia     has required iron infusions    Anxiety     Depression     Intestinal malabsorption     Morbid obesity ([address]     Morbid obesity with body mass index (BMI) of 40.0 to 49.9 ([facility])     Rosacea, acne     Status post gastric bypass for obesity     2018 - [place] [facility]     Past Surgical History:   Procedure Laterality Date    HX  SECTION  2020    [facility]    H[facility] GI  2018    [facility] [place]    HX TUBAL LIGATION      [place] LAP, PLACE ADJUST GASTR BAND  2021    Dr Zapata Stage     Current Facility-Administered Medications   Medication Dose Route Frequency Provider Last Rate Last Admin    lidocaine (XYLOCAINE) 10 mg/mL (1 %) injection    PROTONIEL Fishman MD   100 mL at 21 1328    barium sulfate (EZ PAQUE) 60 % (w/v) contrast susp    PROTONIEL Fishman MD   100 mL at 21 1329          Review of Symptoms:     General - No history or complaints of unexpected fever or chills  Cardiac - No history or complaints of chest pain, palpitations, or shortness of breath  Pulmonary - No history or complaints of shortness of breath or productive cough  Gastrointestinal - as noted above        Physical Exam:    General:  alert, cooperative, no distress, appears stated age Abdomen:   abdomen is soft without significant tenderness, masses, organomegaly or guarding; port in place   Incisions: healing well, no significant drainage       Assessment:     1. History of Morbid obesity, status post gastric banding, given the fluro findings we will proceed with the following adjustment. Plan:     Previous Fill Volume: 2 ml   Removed:       Total fill volume after today's adjustment: 3.8  Added: 1.8 ml                Follow-up in PRN

## 2022-01-19 ENCOUNTER — APPOINTMENT (OUTPATIENT)
Dept: GENERAL RADIOLOGY | Age: 32
End: 2022-01-19
Attending: SPECIALIST
Payer: COMMERCIAL

## 2022-01-19 ENCOUNTER — APPOINTMENT (OUTPATIENT)
Dept: SURGERY | Age: 32
End: 2022-01-19

## 2022-01-19 ENCOUNTER — HOSPITAL ENCOUNTER (OUTPATIENT)
Age: 32
Setting detail: OUTPATIENT SURGERY
Discharge: HOME | End: 2022-01-19
Attending: SPECIALIST | Admitting: SPECIALIST
Payer: COMMERCIAL

## 2022-01-19 VITALS
HEART RATE: 85 BPM | BODY MASS INDEX: 39.28 KG/M2 | SYSTOLIC BLOOD PRESSURE: 127 MMHG | RESPIRATION RATE: 20 BRPM | OXYGEN SATURATION: 100 % | TEMPERATURE: 97.2 F | WEIGHT: 244.4 LBS | HEIGHT: 66 IN | DIASTOLIC BLOOD PRESSURE: 86 MMHG

## 2022-01-19 DIAGNOSIS — Z46.51 FITTING AND ADJUSTMENT OF GASTRIC LAP BAND: ICD-10-CM

## 2022-01-19 DIAGNOSIS — E66.01 MORBID OBESITY WITH BMI OF 40.0-44.9, ADULT (HCC): ICD-10-CM

## 2022-01-19 PROCEDURE — S2083 ADJUSTMENT GASTRIC BAND: HCPCS | Performed by: SPECIALIST

## 2022-01-19 PROCEDURE — 74011000250 HC RX REV CODE- 250: Performed by: SPECIALIST

## 2022-01-19 PROCEDURE — 43999 UNLISTED PROCEDURE STOMACH: CPT | Performed by: SPECIALIST

## 2022-01-19 PROCEDURE — 43999 UNLISTED PROCEDURE STOMACH: CPT

## 2022-01-19 PROCEDURE — 76000 FLUOROSCOPY <1 HR PHYS/QHP: CPT

## 2022-01-19 RX ORDER — LIDOCAINE HYDROCHLORIDE 10 MG/ML
INJECTION INFILTRATION; PERINEURAL AS NEEDED
Status: DISCONTINUED | OUTPATIENT
Start: 2022-01-19 | End: 2022-01-19 | Stop reason: HOSPADM

## 2022-01-19 NOTE — PROCEDURES
Lap Band Encounter (fluroscopy clinic)    Eda Gaona is gastric banding patient who had her procedure on  .  her weight today is 110.9 kg (244 lb 6.4 oz), which correlates to  % EBW loss. she is here today for Lap Band Adjustment / Fill with Fluoroscopy Guidance. she notes the following issues related to the banding procedure; - here for routine adjustment.       Surgery related complications; band over bypass    Visit Vitals  /86   Pulse 85   Temp 97.2 °F (36.2 °C)   Resp 20   Ht 5' 6\" (1.676 m)   Wt 110.9 kg (244 lb 6.4 oz)   SpO2 100%   BMI 39.45 kg/m²       Past Medical History:   Diagnosis Date    Anemia     has required iron infusions    Anxiety     Depression     Intestinal malabsorption     Morbid obesity ([address]     Morbid obesity with body mass index (BMI) of 40.0 to 49.9 ([facility])     Rosacea, acne     Status post gastric bypass for obesity     2018 - [place] [facility]     Past Surgical History:   Procedure Laterality Date    HX  SECTION  2020    [facility]    H[facility] GI  [facility]    [facility] [place]    HX TUBAL LIGATION      [place] LAP, PLACE ADJUST GASTR BAND  2021    Dr Alfred Sorto     Current Facility-Administered Medications   Medication Dose Route Frequency Provider Last Rate Last Admin    lidocaine (XYLOCAINE) 10 mg/mL (1 %) injection    PRN Juanjose Parra MD   1.5 mL at 22 8355          Review of Symptoms:     General - No history or complaints of unexpected fever or chills  Cardiac - No history or complaints of chest pain, palpitations, or shortness of breath  Pulmonary - No history or complaints of shortness of breath or productive cough  Gastrointestinal - as noted above        Physical Exam:    General:  alert, cooperative, no distress, appears stated age   Abdomen:   abdomen is soft without significant tenderness, masses, organomegaly or guarding; port in place   Incisions: healing well, no significant drainage       Assessment:     1. History of Morbid obesity, status post gastric banding, given the fluro findings we will proceed with the following adjustment. Plan:     Previous Fill Volume: 3.8 ml   Removed:       Total fill volume after today's adjustment: 5  Added: 1.2 ml                Follow-up in PRN

## 2022-03-19 PROBLEM — Z98.84 HX OF LAPAROSCOPIC ADJUSTABLE GASTRIC BANDING: Status: ACTIVE | Noted: 2021-09-15

## 2022-05-04 ENCOUNTER — HOSPITAL ENCOUNTER (OUTPATIENT)
Age: 32
Setting detail: OUTPATIENT SURGERY
Discharge: HOME | End: 2022-05-04
Attending: SPECIALIST | Admitting: SPECIALIST
Payer: COMMERCIAL

## 2022-05-04 ENCOUNTER — APPOINTMENT (OUTPATIENT)
Dept: GENERAL RADIOLOGY | Age: 32
End: 2022-05-04
Attending: SPECIALIST
Payer: COMMERCIAL

## 2022-05-04 ENCOUNTER — APPOINTMENT (OUTPATIENT)
Dept: SURGERY | Age: 32
End: 2022-05-04

## 2022-05-04 VITALS
WEIGHT: 245.5 LBS | RESPIRATION RATE: 18 BRPM | OXYGEN SATURATION: 100 % | BODY MASS INDEX: 39.46 KG/M2 | HEIGHT: 66 IN | TEMPERATURE: 98.1 F | DIASTOLIC BLOOD PRESSURE: 83 MMHG | SYSTOLIC BLOOD PRESSURE: 121 MMHG | HEART RATE: 94 BPM

## 2022-05-04 DIAGNOSIS — E66.01 MORBID OBESITY (HCC): ICD-10-CM

## 2022-05-04 DIAGNOSIS — E66.01 MORBID OBESITY WITH BMI OF 40.0-44.9, ADULT (HCC): ICD-10-CM

## 2022-05-04 DIAGNOSIS — Z46.51 FITTING AND ADJUSTMENT OF GASTRIC LAP BAND: ICD-10-CM

## 2022-05-04 PROCEDURE — 74011000250 HC RX REV CODE- 250: Performed by: SPECIALIST

## 2022-05-04 PROCEDURE — 76000 FLUOROSCOPY <1 HR PHYS/QHP: CPT

## 2022-05-04 PROCEDURE — S2083 ADJUSTMENT GASTRIC BAND: HCPCS | Performed by: SPECIALIST

## 2022-05-04 PROCEDURE — 43999 UNLISTED PROCEDURE STOMACH: CPT | Performed by: SPECIALIST

## 2022-05-04 RX ORDER — LIDOCAINE HYDROCHLORIDE 10 MG/ML
INJECTION INFILTRATION; PERINEURAL AS NEEDED
Status: DISCONTINUED | OUTPATIENT
Start: 2022-05-04 | End: 2022-05-04 | Stop reason: HOSPADM

## 2022-05-04 RX ORDER — PHENOL/SODIUM PHENOLATE
20 AEROSOL, SPRAY (ML) MUCOUS MEMBRANE DAILY
Qty: 30 TABLET | Refills: 5 | Status: SHIPPED | OUTPATIENT
Start: 2022-05-04 | End: 2022-05-05

## 2022-05-04 NOTE — PROCEDURES
Lap Band Encounter (fluroscopy clinic)    Lynn Deal is gastric banding patient who had her procedure on  .  her weight today is 111.4 kg (245 lb 8 oz), which correlates to  % EBW loss. she is here today for Lap Band Adjustment / Fill with Fluoroscopy Guidance. she notes the following issues related to the banding procedure; - here for routine adjustment.       Surgery related complications; band over bypass    Visit Vitals  /83   Pulse 94   Temp 98.1 °F (36.7 °C)   Resp 18   Ht 5' 6\" (1.676 m)   Wt 111.4 kg (245 lb 8 oz)   SpO2 100%   BMI 39.62 kg/m²       Past Medical History:   Diagnosis Date    Anemia     has required iron infusions    Anxiety     Depression     Intestinal malabsorption     Morbid obesity ([address]     Morbid obesity with body mass index (BMI) of 40.0 to 49.9 ([facility])     Rosacea, acne     Status post gastric bypass for obesity     2018 - [place] [facility]     Past Surgical History:   Procedure Laterality Date    HX  SECTION  2020    [facility]    [facility] GI  [facility]    [facility] / [place]    HX TUBAL LIGATION      [place] LAP, PLACE ADJUST GASTR BAND  2021    Dr Lio Luna     Current Facility-Administered Medications   Medication Dose Route Frequency Provider Last Rate Last Admin    lidocaine (XYLOCAINE) 10 mg/mL (1 %) injection    PRN Amy Darnell MD   1.5 mL at 22 1341          Review of Symptoms:     General - No history or complaints of unexpected fever or chills  Cardiac - No history or complaints of chest pain, palpitations, or shortness of breath  Pulmonary - No history or complaints of shortness of breath or productive cough  Gastrointestinal - as noted above        Physical Exam:    General:  alert, cooperative, no distress, appears stated age   Abdomen:   abdomen is soft without significant tenderness, masses, organomegaly or guarding; port in place   Incisions: healing well, no significant drainage       Assessment:     1. History of Morbid obesity, status post gastric banding, given the fluro findings we will proceed with the following adjustment. Plan:     Previous Fill Volume: 5 ml   Removed:       Total fill volume after today's adjustment: 6.6  Added: 1.6 ml                Follow-up in PRN

## 2022-05-05 ENCOUNTER — TELEPHONE (OUTPATIENT)
Dept: SURGERY | Age: 32
End: 2022-05-05

## 2022-05-05 RX ORDER — OMEPRAZOLE 20 MG/1
20 CAPSULE, DELAYED RELEASE ORAL DAILY
Qty: 30 CAPSULE | Refills: 5 | Status: SHIPPED | OUTPATIENT
Start: 2022-05-05 | End: 2022-11-01

## 2022-05-05 NOTE — TELEPHONE ENCOUNTER
Phone call with pt and she states that her insurance does not want to cover her Prilosec, but the insurance will cover her husbands prescription. I reviewed pts medications and it could be that the medication was sent in for the tablet form which insurance usually does not cover. Advise pt I will send in for the capsule and if her insurance would not cover that she is to call us back. Pt expresses understanding.

## 2022-08-24 ENCOUNTER — DOCUMENTATION ONLY (OUTPATIENT)
Dept: SURGERY | Age: 32
End: 2022-08-24

## 2022-08-24 NOTE — PROGRESS NOTES
Per [facility] requirements;  E-mail and letter sent for follow up appointment. [address] [facility] Surgical Specialist  [address]   [address] [place], [address]                 [address] [facility] Weight Loss [place]  Critical access hospital Surgical Specialists  [facility]      Dear Patient,    Your health is our main concern. It is important for your health to have follow-up lab work and to see your surgeon at 3 months, 6 months, 9 months and annually after your weight loss surgery. Additionally, the Department of Bariatric Surgery at our hospital is a member of the Metabolic and Bariatric Surgery Accreditation and Quality Improvement Program [facility]). As a participant in this program, we gather information on the outcomes of our patients after surgery. Please call the office for a follow up appointment at [phone]. If you have moved out of the area or have changed surgeons please call us and let us know the name of your doctor. Your health and feedback are important to us. We greatly appreciate your response.        Thank you,  [facility] [address] [facility]

## 2023-01-01 NOTE — TELEPHONE ENCOUNTER
Patient called to see if she could get a prescription for a different nausea medicine. She states that the Zofran is not helping and it is causing constipation. She stated that Phenergan worked well for her when she was in the hospital.  I sent a Rx of Phenergan to her pharmacy. She said that she had missed her 4 week follow up appt and that she sees Dr. Amena Limon and the RD next week. She wanted to know if she should start all of the vitamins now or if she should wait. I told her to just wait until she has her appt and speaks to them before she starts the other vitamins.
[phone]

## 2023-04-06 NOTE — NURSE NAVIGATOR
Per [facility] requirements:  Letter and email sent requesting follow up appointment. [facility] Surgical Specialist  [address]   [address], [address]                 [facility] Loss [place]  [facility] Surgical Specialists  [facility]      Dear Patient,    Your health is our main concern. It is important for your health to have follow-up lab work and to see your surgeon at 3 months, 6 months, 9 months and annually after your weight loss surgery. Additionally, the Department of Bariatric Surgery at our hospital is a member of the Metabolic and Bariatric Surgery Accreditation and Quality Improvement Program [facility]). As a participant in this program, we gather information on the outcomes of our patients after surgery. Please call the office for a follow up appointment at [phone]. If you have moved out of the area or have changed surgeons please call us and let us know the name of your doctor. Your health and feedback are important to us. We greatly appreciate your response.        Thank you,  [facility] [address] [facility]

## 2023-08-17 ENCOUNTER — OFFICE VISIT (OUTPATIENT)
Age: 33
End: 2023-08-17
Payer: OTHER GOVERNMENT

## 2023-08-17 ENCOUNTER — HOSPITAL ENCOUNTER (OUTPATIENT)
Facility: HOSPITAL | Age: 33
Discharge: HOME | End: 2023-08-19
Payer: OTHER GOVERNMENT

## 2023-08-17 VITALS
SYSTOLIC BLOOD PRESSURE: 109 MMHG | TEMPERATURE: 97.6 F | OXYGEN SATURATION: 99 % | WEIGHT: 213 LBS | BODY MASS INDEX: 34.23 KG/M2 | RESPIRATION RATE: 17 BRPM | HEIGHT: 66 IN | HEART RATE: 71 BPM | DIASTOLIC BLOOD PRESSURE: 68 MMHG

## 2023-08-17 DIAGNOSIS — Z71.3 ENCOUNTER FOR DIETARY CONSULTATION: ICD-10-CM

## 2023-08-17 DIAGNOSIS — Z71.3 WEIGHT LOSS COUNSELING, ENCOUNTER FOR: ICD-10-CM

## 2023-08-17 DIAGNOSIS — E66.09 CLASS 1 OBESITY DUE TO EXCESS CALORIES WITHOUT SERIOUS COMORBIDITY WITH BODY MASS INDEX (BMI) OF 34.0 TO 34.9 IN ADULT: Primary | ICD-10-CM

## 2023-08-17 LAB
EKG ATRIAL RATE: 88 BPM
EKG DIAGNOSIS: NORMAL
EKG P AXIS: 77 DEGREES
EKG P-R INTERVAL: 162 MS
EKG Q-T INTERVAL: 354 MS
EKG QRS DURATION: 88 MS
EKG QTC CALCULATION (BAZETT): 428 MS
EKG R AXIS: 112 DEGREES
EKG T AXIS: 63 DEGREES
EKG VENTRICULAR RATE: 88 BPM

## 2023-08-17 PROCEDURE — 93005 ELECTROCARDIOGRAM TRACING: CPT | Performed by: NURSE PRACTITIONER

## 2023-08-17 PROCEDURE — 93010 ELECTROCARDIOGRAM REPORT: CPT | Performed by: INTERNAL MEDICINE

## 2023-08-17 PROCEDURE — 99203 OFFICE O/P NEW LOW 30 MIN: CPT | Performed by: NURSE PRACTITIONER

## 2023-08-17 RX ORDER — FAMOTIDINE 20 MG/1
20 TABLET, FILM COATED ORAL
COMMUNITY
Start: 2023-07-25

## 2023-08-17 RX ORDER — FEXOFENADINE HCL 180 MG/1
180 TABLET ORAL DAILY
COMMUNITY
Start: 2023-02-08

## 2023-08-17 RX ORDER — CYCLOBENZAPRINE HCL 10 MG
TABLET ORAL
COMMUNITY
Start: 2023-08-13

## 2023-08-17 RX ORDER — BUPROPION HYDROCHLORIDE 300 MG/1
300 TABLET ORAL
COMMUNITY
Start: 2023-03-31 | End: 2023-08-17

## 2023-08-17 RX ORDER — ERGOCALCIFEROL 1.25 MG/1
50000 CAPSULE ORAL WEEKLY
COMMUNITY

## 2023-08-17 RX ORDER — PANTOPRAZOLE SODIUM 40 MG/1
40 TABLET, DELAYED RELEASE ORAL DAILY
COMMUNITY
Start: 2023-08-09

## 2023-08-17 ASSESSMENT — ENCOUNTER SYMPTOMS
GASTROINTESTINAL NEGATIVE: 1
RESPIRATORY NEGATIVE: 1
EYES NEGATIVE: 1
ALLERGIC/IMMUNOLOGIC NEGATIVE: 1

## 2023-08-18 DIAGNOSIS — E66.09 CLASS 1 OBESITY DUE TO EXCESS CALORIES WITH SERIOUS COMORBIDITY AND BODY MASS INDEX (BMI) OF 34.0 TO 34.9 IN ADULT: ICD-10-CM

## 2023-08-18 DIAGNOSIS — E66.01 MORBID (SEVERE) OBESITY DUE TO EXCESS CALORIES (HCC): Primary | ICD-10-CM

## 2023-08-18 RX ORDER — PHENTERMINE HYDROCHLORIDE 37.5 MG/1
37.5 TABLET ORAL
Qty: 30 TABLET | Refills: 2 | Status: SHIPPED | OUTPATIENT
Start: 2023-08-18 | End: 2023-11-16

## 2023-09-14 ENCOUNTER — OFFICE VISIT (OUTPATIENT)
Age: 33
End: 2023-09-14

## 2023-09-14 VITALS
BODY MASS INDEX: 33.27 KG/M2 | HEIGHT: 66 IN | HEART RATE: 90 BPM | RESPIRATION RATE: 18 BRPM | DIASTOLIC BLOOD PRESSURE: 74 MMHG | SYSTOLIC BLOOD PRESSURE: 106 MMHG | WEIGHT: 207 LBS | OXYGEN SATURATION: 98 % | TEMPERATURE: 96.8 F

## 2023-09-14 DIAGNOSIS — Z71.3 ENCOUNTER FOR DIETARY CONSULTATION: ICD-10-CM

## 2023-09-14 DIAGNOSIS — E66.09 CLASS 1 OBESITY DUE TO EXCESS CALORIES WITHOUT SERIOUS COMORBIDITY WITH BODY MASS INDEX (BMI) OF 33.0 TO 33.9 IN ADULT: Primary | ICD-10-CM

## 2023-09-14 DIAGNOSIS — Z71.3 WEIGHT LOSS COUNSELING, ENCOUNTER FOR: ICD-10-CM

## 2023-09-14 RX ORDER — PHENTERMINE HYDROCHLORIDE 15 MG/1
15 CAPSULE ORAL EVERY MORNING
Qty: 30 CAPSULE | Refills: 0 | Status: SHIPPED | OUTPATIENT
Start: 2023-09-14 | End: 2023-10-14

## 2023-09-14 ASSESSMENT — ENCOUNTER SYMPTOMS
EYES NEGATIVE: 1
RESPIRATORY NEGATIVE: 1

## 2023-09-15 ENCOUNTER — TELEPHONE (OUTPATIENT)
Age: 33
End: 2023-09-15

## 2023-09-15 DIAGNOSIS — E66.09 CLASS 1 OBESITY DUE TO EXCESS CALORIES WITH SERIOUS COMORBIDITY AND BODY MASS INDEX (BMI) OF 34.0 TO 34.9 IN ADULT: ICD-10-CM

## 2023-09-15 NOTE — TELEPHONE ENCOUNTER
Patient contacted the office stating NP Edward sent in Phentermine with the dosage increase but no refills. Patient would like a call back or clarification. Pharmacy is [facility] on [address] [place] [address].

## 2023-09-25 RX ORDER — PHENTERMINE HYDROCHLORIDE 37.5 MG/1
37.5 TABLET ORAL
Qty: 30 TABLET | Refills: 2 | Status: SHIPPED | OUTPATIENT
Start: 2023-09-25 | End: 2023-12-24

## 2023-10-10 ENCOUNTER — OFFICE VISIT (OUTPATIENT)
Age: 33
End: 2023-10-10
Payer: OTHER GOVERNMENT

## 2023-10-10 VITALS
WEIGHT: 209 LBS | HEART RATE: 75 BPM | RESPIRATION RATE: 18 BRPM | OXYGEN SATURATION: 99 % | DIASTOLIC BLOOD PRESSURE: 71 MMHG | HEIGHT: 66 IN | TEMPERATURE: 97.6 F | BODY MASS INDEX: 33.59 KG/M2 | SYSTOLIC BLOOD PRESSURE: 115 MMHG

## 2023-10-10 DIAGNOSIS — Z71.3 WEIGHT LOSS COUNSELING, ENCOUNTER FOR: ICD-10-CM

## 2023-10-10 DIAGNOSIS — E66.09 CLASS 1 OBESITY DUE TO EXCESS CALORIES WITHOUT SERIOUS COMORBIDITY WITH BODY MASS INDEX (BMI) OF 33.0 TO 33.9 IN ADULT: Primary | ICD-10-CM

## 2023-10-10 DIAGNOSIS — Z71.3 ENCOUNTER FOR DIETARY CONSULTATION: ICD-10-CM

## 2023-10-10 PROCEDURE — 99213 OFFICE O/P EST LOW 20 MIN: CPT | Performed by: NURSE PRACTITIONER

## 2023-10-10 RX ORDER — TOPIRAMATE 25 MG/1
25 TABLET ORAL NIGHTLY
Qty: 60 TABLET | Refills: 3 | Status: SHIPPED | OUTPATIENT
Start: 2023-10-10

## 2023-10-10 ASSESSMENT — ENCOUNTER SYMPTOMS
EYES NEGATIVE: 1
GASTROINTESTINAL NEGATIVE: 1
RESPIRATORY NEGATIVE: 1
ALLERGIC/IMMUNOLOGIC NEGATIVE: 1

## 2023-10-10 NOTE — PROGRESS NOTES
[facility] Progress Note:   F/up Provider Visit    Chief Complaint   Patient presents with    Follow-up     Medical Weight Loss - Phentermine        Anuj Sandhu is a 33 y.o. female who is here for her  f/up medical weight loss visit for the medication program. Patient did not do well this past month she is up 2lbs and 4.5in overall this visit. +2 lbs gained. Arm Circumference: 11in  Waist Circumference: 36.5in  Thigh Circumference: 115/71  Percent Body Fat: 38.0%      Progress and challenges thus far. Patient states that even with the increased dose of phentermine that the food noise returned and she found herself snacking more which likely resulted in her singh gain. Since the GLP-1 medications are still on a national back order we discussed adding topamax onto the phentermine to do a version of Qsymia and see if that jump starts her weight loss. Diet? Low carb/sugar/starch, high protein/vegetable. Did you have any problems adhering to the program last week? No.  If yes, please explain:     Since your last visit, have you experienced any complications? No.    Have you received any other medical care this week? No.  If yes, where and for what? Have you had any change in your medications since your last visit? No.  If yes what? Are you taking an appetite suppressant? Phentermine. If yes:  Do you need a refill? BP Readings from Last 3 Encounters:   10/10/23 115/71   09/14/23 106/74   08/17/23 109/68        Eating Habits Over Last Week:  Did you take in 64 oz of non-caloric fluids? About 50-60oz daily. Did you consume your prescribed meal replacement regimen each day? Yes. Physical Activity Over the Past Week:    Aerobic exercise: Walking 30 mins every other day. Resistance exercise: Physical therapy for her back 2x weekly.       Weight History  Starting wt: 213lbs  Goal wt: 185lbs  EKG due: None  Ideal body weight: 59.3 kg (130 lb 11.7 oz)  Adjusted

## 2023-10-25 ENCOUNTER — HOSPITAL ENCOUNTER (OUTPATIENT)
Facility: HOSPITAL | Age: 33
Discharge: HOME | End: 2023-10-28
Payer: OTHER GOVERNMENT

## 2023-10-25 LAB
ANION GAP SERPL CALC-SCNC: 6 MMOL/L (ref 3–18)
BASOPHILS # BLD: 0 K/UL (ref 0–0.1)
BASOPHILS NFR BLD: 0 % (ref 0–2)
BUN SERPL-MCNC: 11 MG/DL (ref 7–18)
BUN/CREAT SERPL: 16 (ref 12–20)
CALCIUM SERPL-MCNC: 8.8 MG/DL (ref 8.5–10.1)
CHLORIDE SERPL-SCNC: 114 MMOL/L (ref 100–111)
CO2 SERPL-SCNC: 24 MMOL/L (ref 21–32)
CREAT SERPL-MCNC: 0.69 MG/DL (ref 0.6–1.3)
DIFFERENTIAL METHOD BLD: ABNORMAL
EOSINOPHIL # BLD: 0.1 K/UL (ref 0–0.4)
EOSINOPHIL NFR BLD: 1 % (ref 0–5)
ERYTHROCYTE [DISTWIDTH] IN BLOOD BY AUTOMATED COUNT: 14 % (ref 11.6–14.5)
GLUCOSE SERPL-MCNC: 94 MG/DL (ref 74–99)
HCT VFR BLD AUTO: 38.2 % (ref 35–45)
HGB BLD-MCNC: 12.9 G/DL (ref 12–16)
IMM GRANULOCYTES # BLD AUTO: 0.1 K/UL (ref 0–0.04)
IMM GRANULOCYTES NFR BLD AUTO: 1 % (ref 0–0.5)
LYMPHOCYTES # BLD: 2.8 K/UL (ref 0.9–3.6)
LYMPHOCYTES NFR BLD: 33 % (ref 21–52)
MCH RBC QN AUTO: 29.1 PG (ref 24–34)
MCHC RBC AUTO-ENTMCNC: 33.8 G/DL (ref 31–37)
MCV RBC AUTO: 86 FL (ref 78–100)
MONOCYTES # BLD: 0.4 K/UL (ref 0.05–1.2)
MONOCYTES NFR BLD: 5 % (ref 3–10)
NEUTS SEG # BLD: 5.2 K/UL (ref 1.8–8)
NEUTS SEG NFR BLD: 61 % (ref 40–73)
NRBC # BLD: 0 K/UL (ref 0–0.01)
NRBC BLD-RTO: 0 PER 100 WBC
PLATELET # BLD AUTO: 302 K/UL (ref 135–420)
PMV BLD AUTO: 8.2 FL (ref 9.2–11.8)
POTASSIUM SERPL-SCNC: 4.2 MMOL/L (ref 3.5–5.5)
RBC # BLD AUTO: 4.44 M/UL (ref 4.2–5.3)
SODIUM SERPL-SCNC: 144 MMOL/L (ref 136–145)
WBC # BLD AUTO: 8.6 K/UL (ref 4.6–13.2)

## 2023-10-25 PROCEDURE — 85025 COMPLETE CBC W/AUTO DIFF WBC: CPT

## 2023-10-25 PROCEDURE — 80048 BASIC METABOLIC PNL TOTAL CA: CPT

## 2023-10-25 PROCEDURE — 36415 COLL VENOUS BLD VENIPUNCTURE: CPT

## 2023-11-16 ENCOUNTER — ANESTHESIA EVENT (OUTPATIENT)
Facility: HOSPITAL | Age: 33
End: 2023-11-16
Payer: OTHER GOVERNMENT

## 2023-11-17 ENCOUNTER — HOSPITAL ENCOUNTER (OUTPATIENT)
Facility: HOSPITAL | Age: 33
Setting detail: OUTPATIENT SURGERY
Discharge: HOME | End: 2023-11-17
Attending: PLASTIC SURGERY | Admitting: PLASTIC SURGERY
Payer: OTHER GOVERNMENT

## 2023-11-17 ENCOUNTER — ANESTHESIA (OUTPATIENT)
Facility: HOSPITAL | Age: 33
End: 2023-11-17
Payer: OTHER GOVERNMENT

## 2023-11-17 VITALS
HEIGHT: 66 IN | BODY MASS INDEX: 32.92 KG/M2 | SYSTOLIC BLOOD PRESSURE: 110 MMHG | WEIGHT: 204.8 LBS | RESPIRATION RATE: 20 BRPM | OXYGEN SATURATION: 99 % | TEMPERATURE: 97.5 F | HEART RATE: 90 BPM | DIASTOLIC BLOOD PRESSURE: 53 MMHG

## 2023-11-17 LAB — HCG UR QL: NEGATIVE

## 2023-11-17 PROCEDURE — 2709999900 HC NON-CHARGEABLE SUPPLY: Performed by: PLASTIC SURGERY

## 2023-11-17 PROCEDURE — C9290 INJ, BUPIVACAINE LIPOSOME: HCPCS | Performed by: PLASTIC SURGERY

## 2023-11-17 PROCEDURE — 3600000002 HC SURGERY LEVEL 2 BASE: Performed by: PLASTIC SURGERY

## 2023-11-17 PROCEDURE — 6370000000 HC RX 637 (ALT 250 FOR IP): Performed by: ANESTHESIOLOGY

## 2023-11-17 PROCEDURE — 6360000002 HC RX W HCPCS: Performed by: PLASTIC SURGERY

## 2023-11-17 PROCEDURE — 7100000010 HC PHASE II RECOVERY - FIRST 15 MIN: Performed by: PLASTIC SURGERY

## 2023-11-17 PROCEDURE — 2580000003 HC RX 258: Performed by: ANESTHESIOLOGY

## 2023-11-17 PROCEDURE — 3600000012 HC SURGERY LEVEL 2 ADDTL 15MIN: Performed by: PLASTIC SURGERY

## 2023-11-17 PROCEDURE — 2580000003 HC RX 258: Performed by: PLASTIC SURGERY

## 2023-11-17 PROCEDURE — 6360000002 HC RX W HCPCS

## 2023-11-17 PROCEDURE — 88300 SURGICAL PATH GROSS: CPT

## 2023-11-17 PROCEDURE — 6360000002 HC RX W HCPCS: Performed by: ANESTHESIOLOGY

## 2023-11-17 PROCEDURE — 7100000001 HC PACU RECOVERY - ADDTL 15 MIN: Performed by: PLASTIC SURGERY

## 2023-11-17 PROCEDURE — 3700000001 HC ADD 15 MINUTES (ANESTHESIA): Performed by: PLASTIC SURGERY

## 2023-11-17 PROCEDURE — 7100000011 HC PHASE II RECOVERY - ADDTL 15 MIN: Performed by: PLASTIC SURGERY

## 2023-11-17 PROCEDURE — A4217 STERILE WATER/SALINE, 500 ML: HCPCS | Performed by: PLASTIC SURGERY

## 2023-11-17 PROCEDURE — 3700000000 HC ANESTHESIA ATTENDED CARE: Performed by: PLASTIC SURGERY

## 2023-11-17 PROCEDURE — 7100000000 HC PACU RECOVERY - FIRST 15 MIN: Performed by: PLASTIC SURGERY

## 2023-11-17 PROCEDURE — 81025 URINE PREGNANCY TEST: CPT

## 2023-11-17 PROCEDURE — 2500000003 HC RX 250 WO HCPCS

## 2023-11-17 RX ORDER — IPRATROPIUM BROMIDE AND ALBUTEROL SULFATE 2.5; .5 MG/3ML; MG/3ML
1 SOLUTION RESPIRATORY (INHALATION)
Status: DISCONTINUED | OUTPATIENT
Start: 2023-11-17 | End: 2023-11-17 | Stop reason: HOSPADM

## 2023-11-17 RX ORDER — DIPHENHYDRAMINE HYDROCHLORIDE 50 MG/ML
12.5 INJECTION INTRAMUSCULAR; INTRAVENOUS
Status: DISCONTINUED | OUTPATIENT
Start: 2023-11-17 | End: 2023-11-17 | Stop reason: HOSPADM

## 2023-11-17 RX ORDER — LORAZEPAM 2 MG/ML
0.5 INJECTION INTRAMUSCULAR
Status: DISCONTINUED | OUTPATIENT
Start: 2023-11-17 | End: 2023-11-17 | Stop reason: HOSPADM

## 2023-11-17 RX ORDER — DROPERIDOL 2.5 MG/ML
0.62 INJECTION, SOLUTION INTRAMUSCULAR; INTRAVENOUS
Status: DISCONTINUED | OUTPATIENT
Start: 2023-11-17 | End: 2023-11-17 | Stop reason: HOSPADM

## 2023-11-17 RX ORDER — SODIUM CHLORIDE 9 MG/ML
INJECTION, SOLUTION INTRAVENOUS PRN
Status: DISCONTINUED | OUTPATIENT
Start: 2023-11-17 | End: 2023-11-17 | Stop reason: HOSPADM

## 2023-11-17 RX ORDER — KETAMINE HCL IN NACL, ISO-OSM 100MG/10ML
SYRINGE (ML) INJECTION PRN
Status: DISCONTINUED | OUTPATIENT
Start: 2023-11-17 | End: 2023-11-17 | Stop reason: SDUPTHER

## 2023-11-17 RX ORDER — HYDROMORPHONE HYDROCHLORIDE 1 MG/ML
0.5 INJECTION, SOLUTION INTRAMUSCULAR; INTRAVENOUS; SUBCUTANEOUS EVERY 5 MIN PRN
Status: DISCONTINUED | OUTPATIENT
Start: 2023-11-17 | End: 2023-11-17 | Stop reason: HOSPADM

## 2023-11-17 RX ORDER — OXYCODONE HYDROCHLORIDE 5 MG/1
5 TABLET ORAL
Status: COMPLETED | OUTPATIENT
Start: 2023-11-17 | End: 2023-11-17

## 2023-11-17 RX ORDER — LABETALOL HYDROCHLORIDE 5 MG/ML
10 INJECTION, SOLUTION INTRAVENOUS
Status: DISCONTINUED | OUTPATIENT
Start: 2023-11-17 | End: 2023-11-17 | Stop reason: HOSPADM

## 2023-11-17 RX ORDER — DEXAMETHASONE SODIUM PHOSPHATE 4 MG/ML
INJECTION, SOLUTION INTRA-ARTICULAR; INTRALESIONAL; INTRAMUSCULAR; INTRAVENOUS; SOFT TISSUE PRN
Status: DISCONTINUED | OUTPATIENT
Start: 2023-11-17 | End: 2023-11-17 | Stop reason: SDUPTHER

## 2023-11-17 RX ORDER — ROCURONIUM BROMIDE 10 MG/ML
INJECTION, SOLUTION INTRAVENOUS PRN
Status: DISCONTINUED | OUTPATIENT
Start: 2023-11-17 | End: 2023-11-17 | Stop reason: SDUPTHER

## 2023-11-17 RX ORDER — CHLORHEXIDINE GLUCONATE 4 G/100ML
SOLUTION TOPICAL ONCE
Status: DISCONTINUED | OUTPATIENT
Start: 2023-11-17 | End: 2023-11-17 | Stop reason: HOSPADM

## 2023-11-17 RX ORDER — FENTANYL CITRATE 50 UG/ML
25 INJECTION, SOLUTION INTRAMUSCULAR; INTRAVENOUS EVERY 5 MIN PRN
Status: COMPLETED | OUTPATIENT
Start: 2023-11-17 | End: 2023-11-17

## 2023-11-17 RX ORDER — METOCLOPRAMIDE HYDROCHLORIDE 5 MG/ML
INJECTION INTRAMUSCULAR; INTRAVENOUS PRN
Status: DISCONTINUED | OUTPATIENT
Start: 2023-11-17 | End: 2023-11-17 | Stop reason: SDUPTHER

## 2023-11-17 RX ORDER — MEPERIDINE HYDROCHLORIDE 50 MG/ML
12.5 INJECTION INTRAMUSCULAR; INTRAVENOUS; SUBCUTANEOUS ONCE
Status: DISCONTINUED | OUTPATIENT
Start: 2023-11-17 | End: 2023-11-17 | Stop reason: HOSPADM

## 2023-11-17 RX ORDER — SODIUM CHLORIDE 0.9 % (FLUSH) 0.9 %
5-40 SYRINGE (ML) INJECTION PRN
Status: DISCONTINUED | OUTPATIENT
Start: 2023-11-17 | End: 2023-11-17 | Stop reason: HOSPADM

## 2023-11-17 RX ORDER — SODIUM CHLORIDE, SODIUM LACTATE, POTASSIUM CHLORIDE, CALCIUM CHLORIDE 600; 310; 30; 20 MG/100ML; MG/100ML; MG/100ML; MG/100ML
INJECTION, SOLUTION INTRAVENOUS CONTINUOUS
Status: DISCONTINUED | OUTPATIENT
Start: 2023-11-17 | End: 2023-11-17 | Stop reason: HOSPADM

## 2023-11-17 RX ORDER — PROPOFOL 10 MG/ML
INJECTION, EMULSION INTRAVENOUS PRN
Status: DISCONTINUED | OUTPATIENT
Start: 2023-11-17 | End: 2023-11-17 | Stop reason: SDUPTHER

## 2023-11-17 RX ORDER — SODIUM CHLORIDE 0.9 % (FLUSH) 0.9 %
5-40 SYRINGE (ML) INJECTION EVERY 12 HOURS SCHEDULED
Status: DISCONTINUED | OUTPATIENT
Start: 2023-11-17 | End: 2023-11-17 | Stop reason: HOSPADM

## 2023-11-17 RX ORDER — ONDANSETRON 2 MG/ML
4 INJECTION INTRAMUSCULAR; INTRAVENOUS
Status: DISCONTINUED | OUTPATIENT
Start: 2023-11-17 | End: 2023-11-17 | Stop reason: HOSPADM

## 2023-11-17 RX ORDER — CLINDAMYCIN PHOSPHATE 900 MG/50ML
900 INJECTION, SOLUTION INTRAVENOUS
Status: COMPLETED | OUTPATIENT
Start: 2023-11-17 | End: 2023-11-17

## 2023-11-17 RX ORDER — SCOLOPAMINE TRANSDERMAL SYSTEM 1 MG/1
1 PATCH, EXTENDED RELEASE TRANSDERMAL
Status: DISCONTINUED | OUTPATIENT
Start: 2023-11-17 | End: 2023-11-17 | Stop reason: HOSPADM

## 2023-11-17 RX ORDER — MIDAZOLAM HYDROCHLORIDE 1 MG/ML
INJECTION INTRAMUSCULAR; INTRAVENOUS PRN
Status: DISCONTINUED | OUTPATIENT
Start: 2023-11-17 | End: 2023-11-17 | Stop reason: SDUPTHER

## 2023-11-17 RX ORDER — PHENYLEPHRINE HCL IN 0.9% NACL 1 MG/10 ML
SYRINGE (ML) INTRAVENOUS PRN
Status: DISCONTINUED | OUTPATIENT
Start: 2023-11-17 | End: 2023-11-17 | Stop reason: SDUPTHER

## 2023-11-17 RX ORDER — ONDANSETRON 2 MG/ML
INJECTION INTRAMUSCULAR; INTRAVENOUS PRN
Status: DISCONTINUED | OUTPATIENT
Start: 2023-11-17 | End: 2023-11-17 | Stop reason: SDUPTHER

## 2023-11-17 RX ORDER — FENTANYL CITRATE 50 UG/ML
INJECTION, SOLUTION INTRAMUSCULAR; INTRAVENOUS PRN
Status: DISCONTINUED | OUTPATIENT
Start: 2023-11-17 | End: 2023-11-17 | Stop reason: SDUPTHER

## 2023-11-17 RX ORDER — LIDOCAINE HYDROCHLORIDE 20 MG/ML
INJECTION, SOLUTION EPIDURAL; INFILTRATION; INTRACAUDAL; PERINEURAL PRN
Status: DISCONTINUED | OUTPATIENT
Start: 2023-11-17 | End: 2023-11-17 | Stop reason: SDUPTHER

## 2023-11-17 RX ADMIN — METOCLOPRAMIDE 10 MG: 5 INJECTION, SOLUTION INTRAMUSCULAR; INTRAVENOUS at 09:29

## 2023-11-17 RX ADMIN — Medication 100 MCG: at 09:07

## 2023-11-17 RX ADMIN — HYDROMORPHONE HYDROCHLORIDE 0.5 MG: 1 INJECTION, SOLUTION INTRAMUSCULAR; INTRAVENOUS; SUBCUTANEOUS at 09:13

## 2023-11-17 RX ADMIN — FENTANYL CITRATE 50 MCG: 50 INJECTION, SOLUTION INTRAMUSCULAR; INTRAVENOUS at 08:23

## 2023-11-17 RX ADMIN — FENTANYL CITRATE 25 MCG: 50 INJECTION INTRAMUSCULAR; INTRAVENOUS at 11:10

## 2023-11-17 RX ADMIN — MIDAZOLAM 2 MG: 1 INJECTION INTRAMUSCULAR; INTRAVENOUS at 07:29

## 2023-11-17 RX ADMIN — PROPOFOL 25 MCG/KG/MIN: 10 INJECTION, EMULSION INTRAVENOUS at 07:40

## 2023-11-17 RX ADMIN — FENTANYL CITRATE 25 MCG: 50 INJECTION INTRAMUSCULAR; INTRAVENOUS at 10:24

## 2023-11-17 RX ADMIN — HYDROMORPHONE HYDROCHLORIDE 0.5 MG: 1 INJECTION, SOLUTION INTRAMUSCULAR; INTRAVENOUS; SUBCUTANEOUS at 10:00

## 2023-11-17 RX ADMIN — OXYCODONE HYDROCHLORIDE 5 MG: 5 TABLET ORAL at 11:50

## 2023-11-17 RX ADMIN — SODIUM CHLORIDE, POTASSIUM CHLORIDE, SODIUM LACTATE AND CALCIUM CHLORIDE 125 ML: 600; 310; 30; 20 INJECTION, SOLUTION INTRAVENOUS at 10:21

## 2023-11-17 RX ADMIN — SUGAMMADEX 200 MG: 100 INJECTION, SOLUTION INTRAVENOUS at 09:23

## 2023-11-17 RX ADMIN — FENTANYL CITRATE 25 MCG: 50 INJECTION INTRAMUSCULAR; INTRAVENOUS at 10:41

## 2023-11-17 RX ADMIN — FENTANYL CITRATE 50 MCG: 50 INJECTION, SOLUTION INTRAMUSCULAR; INTRAVENOUS at 07:33

## 2023-11-17 RX ADMIN — ROCURONIUM BROMIDE 50 MG: 10 INJECTION, SOLUTION INTRAVENOUS at 07:35

## 2023-11-17 RX ADMIN — LIDOCAINE HYDROCHLORIDE 100 MG: 20 INJECTION, SOLUTION EPIDURAL; INFILTRATION; INTRACAUDAL; PERINEURAL at 07:35

## 2023-11-17 RX ADMIN — DEXAMETHASONE SODIUM PHOSPHATE 10 MG: 4 INJECTION, SOLUTION INTRAMUSCULAR; INTRAVENOUS at 07:43

## 2023-11-17 RX ADMIN — SODIUM CHLORIDE, POTASSIUM CHLORIDE, SODIUM LACTATE AND CALCIUM CHLORIDE: 600; 310; 30; 20 INJECTION, SOLUTION INTRAVENOUS at 06:08

## 2023-11-17 RX ADMIN — Medication 20 MG: at 08:32

## 2023-11-17 RX ADMIN — Medication 30 MG: at 07:43

## 2023-11-17 RX ADMIN — ONDANSETRON HYDROCHLORIDE 4 MG: 2 INJECTION INTRAMUSCULAR; INTRAVENOUS at 09:29

## 2023-11-17 RX ADMIN — FENTANYL CITRATE 25 MCG: 50 INJECTION INTRAMUSCULAR; INTRAVENOUS at 10:55

## 2023-11-17 RX ADMIN — PROPOFOL 150 MG: 10 INJECTION, EMULSION INTRAVENOUS at 07:35

## 2023-11-17 RX ADMIN — CLINDAMYCIN PHOSPHATE 900 MG: 900 INJECTION, SOLUTION INTRAVENOUS at 07:42

## 2023-11-17 RX ADMIN — Medication 100 MCG: at 08:38

## 2023-11-17 ASSESSMENT — PAIN SCALES - GENERAL
PAINLEVEL_OUTOF10: 6
PAINLEVEL_OUTOF10: 5
PAINLEVEL_OUTOF10: 4
PAINLEVEL_OUTOF10: 6
PAINLEVEL_OUTOF10: 3
PAINLEVEL_OUTOF10: 5
PAINLEVEL_OUTOF10: 4
PAINLEVEL_OUTOF10: 0
PAINLEVEL_OUTOF10: 5
PAINLEVEL_OUTOF10: 4
PAINLEVEL_OUTOF10: 6
PAINLEVEL_OUTOF10: 6

## 2023-11-17 ASSESSMENT — PAIN DESCRIPTION - LOCATION
LOCATION: ABDOMEN

## 2023-11-17 ASSESSMENT — PAIN - FUNCTIONAL ASSESSMENT: PAIN_FUNCTIONAL_ASSESSMENT: 0-10

## 2023-11-17 ASSESSMENT — PAIN DESCRIPTION - DESCRIPTORS
DESCRIPTORS: SORE
DESCRIPTORS: SHARP
DESCRIPTORS: CRAMPING;ACHING
DESCRIPTORS: SORE;ACHING
DESCRIPTORS: ACHING;SORE

## 2023-11-17 ASSESSMENT — PAIN DESCRIPTION - FREQUENCY
FREQUENCY: INTERMITTENT
FREQUENCY: INTERMITTENT

## 2023-11-17 ASSESSMENT — PAIN DESCRIPTION - PAIN TYPE
TYPE: SURGICAL PAIN

## 2023-11-17 ASSESSMENT — PAIN DESCRIPTION - ORIENTATION
ORIENTATION: MID
ORIENTATION: MID

## 2023-11-17 ASSESSMENT — PAIN DESCRIPTION - ONSET: ONSET: PROGRESSIVE

## 2023-11-17 NOTE — PERIOP NOTE
Patient concerned about nausea wants another prescription for zofran called Dr. Polly Flores who said she should have enough for today if she needs more she can call him this weekend.

## 2023-11-17 NOTE — ANESTHESIA PRE PROCEDURE
PM    AGRATIO 1.2 08/23/2021 03:10 PM    LABGLOM >60 10/25/2023 11:57 AM    GLUCOSE 94 10/25/2023 11:57 AM    PROT 7.6 08/23/2021 03:10 PM    CALCIUM 8.8 10/25/2023 11:57 AM    BILITOT 0.4 08/23/2021 03:10 PM    ALKPHOS 99 08/23/2021 03:10 PM    AST 27 08/23/2021 03:10 PM    ALT 79 08/23/2021 03:10 PM       POC Tests: No results for input(s): \"POCGLU\", \"POCNA\", \"POCK\", \"POCCL\", \"POCBUN\", \"POCHEMO\", \"POCHCT\" in the last 72 hours. Coags: No results found for: \"PROTIME\", \"INR\", \"APTT\"    HCG (If Applicable):   Lab Results   Component Value Date    PREGTESTUR Negative 11/17/2023        ABGs: No results found for: \"PHART\", \"PO2ART\", \"GEG8RQC\", \"NQC5MPR\", \"BEART\", \"A1HRSIZE\"     Type & Screen (If Applicable):  No results found for: \"LABABO\", \"LABRH\"    Drug/Infectious Status (If Applicable):  No results found for: \"HIV\", \"HEPCAB\"    COVID-19 Screening (If Applicable): No results found for: \"COVID19\"        Anesthesia Evaluation  Patient summary reviewed and Nursing notes reviewed   history of anesthetic complications: PONV. Airway: Mallampati: II  TM distance: >3 FB   Neck ROM: full     Dental: normal exam         Pulmonary:Negative Pulmonary ROS and normal exam  breath sounds clear to auscultation                             Cardiovascular:Negative CV ROS  Exercise tolerance: good (>4 METS),           Rhythm: regular  Rate: normal                    Neuro/Psych:   (+) psychiatric history:depression/anxiety             GI/Hepatic/Renal:   (+) GERD:, morbid obesity          Endo/Other: Negative Endo/Other ROS                    Abdominal: normal exam            Vascular: negative vascular ROS. Other Findings:           Anesthesia Plan      general     ASA 2       Induction: intravenous. Anesthetic plan and risks discussed with patient. Plan discussed with CRNA.                     Renea Cain MD   11/17/2023

## 2023-11-17 NOTE — PERIOP NOTE
Complains of pain to surgical site 7/10 sharp in discription, resting with eyes closed resp easy and even will medicate per MDA guidelines. DIANE drain charged abd binder in place.

## 2023-11-17 NOTE — INTERVAL H&P NOTE
Update History & Physical    The patient's History and Physical of 11/17/2023 was reviewed with the patient and I examined the patient. There was no change. The surgical site was confirmed by the patient and me. Plan: The risks, benefits, expected outcome, and alternative to the recommended procedure have been discussed with the patient. Patient understands and wants to proceed with the procedure.      Electronically signed by Loren Hassan MD on 11/17/2023 at 7:03 AM

## 2023-11-17 NOTE — PERIOP NOTE
Discharge instructions were reviewed with patient and family. DIANE drain teaching was completed, and handouts given. All questions were answered.

## 2023-11-17 NOTE — PERIOP NOTE
TRANSFER - IN REPORT:    Verbal report received from ORN & CRNA on Jenifer Huston  being received from OR  for routine post-op      Report consisted of patient's Situation, Background, Assessment and   Recommendations(SBAR). Information from the following report(s) Nurse Handoff Report was reviewed with the receiving nurse. Opportunity for questions and clarification was provided. Assessment completed upon patient's arrival to unit and care assumed.

## 2023-11-17 NOTE — PERIOP NOTE
TRANSFER - IN REPORT:    Verbal report received from [facility] on Eliel Plants  being received from PACU for routine post-op      Report consisted of patient's Situation, Background, Assessment and   Recommendations(SBAR). Information from the following report(s) Nurse Handoff Report, Surgery Report, Intake/Output, and MAR was reviewed with the receiving nurse. Opportunity for questions and clarification was provided. Assessment completed upon patient's arrival to unit and care assumed.

## 2023-11-17 NOTE — BRIEF OP NOTE
Brief Postoperative Note      Patient: John Farrell  YOB: 1990  MRN: [id number]    Date of Procedure: 11/17/2023    Pre-Op Diagnosis:  Abdominal pannus s/p massive weight loss, panniculitis    Post-Op Diagnosis: Same       Procedure(s):  PANNICULECTOMY WITH MUSCLE PLICATION AND UMBILICAL TRANSPOSITION    Surgeon(s):  Karin Giron MD    Assistant:  Surgical Assistant: Laurita Orosco    Anesthesia: General    Estimated Blood Loss (mL): 34LA    Complications: None    Specimens:   ID Type Source Tests Collected by Time Destination   A : pannus    2076 G    GROSS EXAM ONLY Tissue Abdomen SURGICAL PATHOLOGY Karin Giron MD 11/17/2023 4472        Implants:  * No implants in log *      Drains:   Closed/Suction Drain Anterior Abdomen Bulb (Active)   Site Description Clean, dry & intact 11/17/23 0847   Dressing Status Clean, dry & intact 11/17/23 0847   Drain Status Compressed; To bulb suction 11/17/23 0847       Findings: N/A      Electronically signed by Karin Giron MD on 11/17/2023 at 9:37 AM

## 2023-11-17 NOTE — PERIOP NOTE
Reviewed PTA medication list with patient/caregiver and patient/caregiver denies any additional medications. Patient admits to having a responsible adult care for them at home for at least 24 hours after surgery. Patient encouraged to use gown warming system and informed that using said warming gown to regulate body temperature prior to a procedure has been shown to help reduce the risks of blood clots and infection. Patient's pharmacy of choice verified and documented in PTA medication section. Dual skin assessment & fall risk band verification completed with Hugo Madison RN. Urine pregnancy results Neg and verified with Ludivina Schilder RN.

## 2023-11-17 NOTE — OP NOTE
[facility]  [facility]    Name:  Lazaro Ricardo  MR#:   [id number]  :  1990  ACCOUNT #:  [de-identified]  DATE OF SERVICE:  2023    PREOPERATIVE DIAGNOSES:  1. Abdominal pannus status post massive weight loss. 2.  Panniculitis. POSTOPERATIVE DIAGNOSES:  1. Abdominal pannus status post massive weight loss. 2.  Panniculitis. PROCEDURE PERFORMED:  Panniculectomy with fascial plication and umbilical transposition. SURGEON:  Lg Poole MD    ASSISTANT:  Rodney Interiano. ANESTHESIA:  General endotracheal anesthesia. COMPLICATIONS:  None. SPECIMENS REMOVED:  Abdominal pannus for gross examination only. IMPLANTS:  None. ESTIMATED BLOOD LOSS:  75 mL. INTRAVENOUS FLUIDS:  Total 1800 mL of crystalloid. URINE OUTPUT:  None. DRAINS:  One 15-Slovenian round Venancio drain to bulb suction. CONDITION:  Stable. DISPOSITION:  The patient has been extubated and now awaits transport to the recovery room in stable condition. INDICATIONS:  The patient is a very pleasant 71-year-old female with a prior history of morbid obesity status post bariatric surgery with a greater than 100 pounds weight loss. As a result of this, the patient has had difficulties with significant excess skin of the abdomen. The patient has had issues with recurrent intertriginous infections. Despite maximal medical treatment, the patient sought evaluation for possible surgical panniculectomy. Following a thorough examination and discussion of the available treatment options, the patient wished to proceed with a panniculectomy with fascial plication and umbilical transposition.   Not only were the technical aspects of the planned surgery extensively reviewed so were the potential risks, outlining specifically the risks of bleeding, infection, the resultant scars, potential need for additional surgery, wound healing difficulties including hematoma, seroma formation, wound dehiscence, entire length of the lower abdominal incision. This was followed by running intracuticular 3-0 Monocryl for the superficial skin. At the planned umbilical inset site, an #11 scalpel blade was used to excise a small portion of skin and electrocautery used to divide the deep subcutaneous tissue. This allowed for retrieval of the umbilical stalk and inset into its new position utilizing interrupted 3-0 Monocryl suture in the dermis and ultimately by running intracuticular 4-0 Monocryl for the superficial skin. Once the umbilical inset was complete, Exparel (liposomal bupivacaine) was infiltrated into the soft tissue surrounding the surgical sites. The surgical sites were then gently cleansed and dried. Xeroform gauze applied to the umbilicus and Dermabond tissue adhesive applied over the transverse lower abdominal incision. Once this was fully dried, additional gauze dressings and an abdominal binder were placed on the patient. As had been done throughout the procedure, she had been carefully checked for overall shape and symmetry. With this being excellent and the wound was now completely closed and dressed, the patient has been aroused from her anesthetic and extubated. The patient now awaits transport to the recovery room in stable condition.       MD SUDHAKAR [email or web address]  D:  11/17/2023 9:49  T:  11/17/2023 11:42  JOB #:  [id number]

## 2023-11-17 NOTE — DISCHARGE INSTRUCTIONS
REGULAR DIET. AMBULATE MULTIPLE TIMES DAILY. PLEASE TEACH HOME DIANE CARE PRIOR TO DISCHARGE, EMPTY, STRIP AND RECORD THE OUTPUT DAILY. ABDOMINAL BINDER AT ALL TIMES EXCEPT WHEN SHOWERING AND IF IT NEEDS TO BE WASHED. OK TO REMOVE THE BINDER AND ALL GAUZE TOMORROW AND SHOWER. REPLACE BINDER AFTER SHOWERING. NO LIFTING >10 LBS. OK TO SLEEP ON BACK OR SIDES, DO NOT SLEEP ON STOMACH. DISCHARGE SUMMARY from Nurse    PATIENT INSTRUCTIONS:    After general anesthesia or intravenous sedation, for 24 hours or while taking prescription Narcotics:  Limit your activities  Do not drive and operate hazardous machinery  Do not make important personal or business decisions  Do  not drink alcoholic beverages  If you have not urinated within 8 hours after discharge, please contact your surgeon on call. Report the following to your surgeon:  Excessive pain, swelling, redness or odor of or around the surgical area  Temperature over 100.5  Nausea and vomiting lasting longer than 4 hours or if unable to take medications  Any signs of decreased circulation or nerve impairment to extremity: change in color, persistent  numbness, tingling, coldness or increase pain  Any questions    What to do at Home:  Recommended activity: follow surgeons instructions,     If you experience any of the following symptoms, fever, chills, foul drainage or uncontrolled pain please follow up with Dr. Florinda Everett. *  Please give a list of your current medications to your Primary Care Provider. *  Please update this list whenever your medications are discontinued, doses are      changed, or new medications (including over-the-counter products) are added. *  Please carry medication information at all times in case of emergency situations.     These are general instructions for a healthy lifestyle:    No smoking/ No tobacco products/ Avoid exposure to second hand smoke  Surgeon General's Warning:  Quitting smoking now greatly reduces serious risk to your health. Obesity, smoking, and sedentary lifestyle greatly increases your risk for illness    A healthy diet, regular physical exercise & weight monitoring are important for maintaining a healthy lifestyle    You may be retaining fluid if you have a history of heart failure or if you experience any of the following symptoms:  Weight gain of 3 pounds or more overnight or 5 pounds in a week, increased swelling in our hands or feet or shortness of breath while lying flat in bed. Please call your doctor as soon as you notice any of these symptoms; do not wait until your next office visit. Patient armband removed and shredded     The discharge information has been reviewed with the patient and caregiver. The patient and caregiver verbalized understanding. Discharge medications reviewed with the patient and caregiver and appropriate educational materials and side effects teaching were provided.   ___________________________________________________________________________________________________________________________________

## 2023-11-17 NOTE — PERIOP NOTE
TRANSFER - OUT REPORT:    Verbal report given to David Pepe RN on Viri Agent  being transferred to Phase 2 for routine post-op       Report consisted of patient's Situation, Background, Assessment and   Recommendations(SBAR). Information from the following report(s) Nurse Handoff Report was reviewed with the receiving nurse. Lines:   Peripheral IV 11/17/23 Left Forearm (Active)   Site Assessment Clean, dry & intact 11/17/23 1109   Line Status Infusing 11/17/23 [address] Connections checked and tightened 11/17/23 1109   Phlebitis Assessment No symptoms 11/17/23 1109   Infiltration Assessment 0 11/17/23 1109   Alcohol Cap Used No 11/17/23 1109   Dressing Status Clean, dry & intact 11/17/23 1109   Dressing Type Transparent 11/17/23 1109        Opportunity for questions and clarification was provided.       Patient transported with:  Registered Nurse

## 2023-11-17 NOTE — PERIOP NOTE
Intake/Output Summary (Last 24 hours) at 11/17/2023 1123  Last data filed at 11/17/2023 1106  Gross per 24 hour   Intake 2500 ml   Output 95 ml   Net 2405 ml

## 2023-12-12 ENCOUNTER — TELEMEDICINE (OUTPATIENT)
Age: 33
End: 2023-12-12
Payer: OTHER GOVERNMENT

## 2023-12-12 DIAGNOSIS — Z71.3 ENCOUNTER FOR DIETARY CONSULTATION: ICD-10-CM

## 2023-12-12 DIAGNOSIS — Z71.3 WEIGHT LOSS COUNSELING, ENCOUNTER FOR: ICD-10-CM

## 2023-12-12 DIAGNOSIS — E66.09 CLASS 1 OBESITY DUE TO EXCESS CALORIES WITHOUT SERIOUS COMORBIDITY WITH BODY MASS INDEX (BMI) OF 33.0 TO 33.9 IN ADULT: Primary | ICD-10-CM

## 2023-12-12 PROCEDURE — 99213 OFFICE O/P EST LOW 20 MIN: CPT | Performed by: NURSE PRACTITIONER

## 2023-12-12 ASSESSMENT — ENCOUNTER SYMPTOMS
ALLERGIC/IMMUNOLOGIC NEGATIVE: 1
GASTROINTESTINAL NEGATIVE: 1
RESPIRATORY NEGATIVE: 1
EYES NEGATIVE: 1

## 2023-12-12 NOTE — PROGRESS NOTES
[facility] Progress Note:   F/up Provider Visit    Chief Complaint   Patient presents with    Follow-up     Medical Weight Loss - Phentermine/Topiramate        Rusty Mcrae is a 29 y.o. female who is here for her  f/up medical weight loss visit for the medication program. Patient has done well this past month she is down 13lbs overall this visit. -13 lbs lost.   Arm Circumference: - Unable to assess (virtual visit)  Waist Circumference: - Unable to assess (virtual visit)  Thigh Circumference: - Unable to assess (virtual visit)  Percent Body Fat: - HARDIK      Progress and challenges thus far. Patient endorses that she stopped the phentermine 3 weeks ago as she recently has panectomy surgery. She states that she did not feel like the phentermine was very effective however she is taking it as generic equivalent of Qsymia with topiramate so have advised patient to restart and see if she has any weight loss. She did inquire about the  GLP-1 medications and I advised that Davin Rivera remains on a national back order, but that ZepBound was forthcoming so she will likely inquire about that at her next appointment. Diet? Low carb/sugar/starch, high protein/vegetable. Did you have any problems adhering to the program last week? No.  If yes, please explain:     Since your last visit, have you experienced any complications? No.    Have you received any other medical care this week? No.  If yes, where and for what? Have you had any change in your medications since your last visit? No.  If yes what? Are you taking an appetite suppressant? Phentermine/Topiramate. If yes:  Do you need a refill? BP Readings from Last 3 Encounters:   11/17/23 (!) 110/53   10/10/23 115/71   09/14/23 106/74        Eating Habits Over Last Week:  Did you take in 64 oz of non-caloric fluids? Yes. Did you consume your prescribed meal replacement regimen each day? Yes.       Physical Activity Over the Past

## 2024-01-26 ENCOUNTER — TELEMEDICINE (OUTPATIENT)
Age: 34
End: 2024-01-26
Payer: OTHER GOVERNMENT

## 2024-01-26 DIAGNOSIS — R11.0 NAUSEA: ICD-10-CM

## 2024-01-26 DIAGNOSIS — Z71.3 ENCOUNTER FOR DIETARY COUNSELING AND SURVEILLANCE: ICD-10-CM

## 2024-01-26 DIAGNOSIS — Z71.3 ENCOUNTER FOR WEIGHT LOSS COUNSELING: ICD-10-CM

## 2024-01-26 DIAGNOSIS — E66.09 CLASS 1 OBESITY DUE TO EXCESS CALORIES WITHOUT SERIOUS COMORBIDITY WITH BODY MASS INDEX (BMI) OF 33.0 TO 33.9 IN ADULT: Primary | ICD-10-CM

## 2024-01-26 DIAGNOSIS — Z79.899 FOLLOW-UP ENCOUNTER INVOLVING MEDICATION: ICD-10-CM

## 2024-01-26 DIAGNOSIS — Z98.84 S/P GASTRIC BYPASS: ICD-10-CM

## 2024-01-26 PROCEDURE — 99213 OFFICE O/P EST LOW 20 MIN: CPT | Performed by: NURSE PRACTITIONER

## 2024-01-26 RX ORDER — CLONAZEPAM 0.5 MG/1
0.5 TABLET ORAL 3 TIMES DAILY
COMMUNITY
Start: 2024-01-11 | End: 2024-04-10

## 2024-01-26 RX ORDER — TIRZEPATIDE 5 MG/.5ML
5 INJECTION, SOLUTION SUBCUTANEOUS
Qty: 2 ML | Refills: 0 | Status: SHIPPED | OUTPATIENT
Start: 2024-01-26

## 2024-01-26 RX ORDER — TIRZEPATIDE 2.5 MG/.5ML
2.5 INJECTION, SOLUTION SUBCUTANEOUS
Qty: 2 ML | Refills: 0 | Status: SHIPPED | OUTPATIENT
Start: 2024-01-26

## 2024-01-26 RX ORDER — PROMETHAZINE HYDROCHLORIDE 12.5 MG/1
12.5 TABLET ORAL EVERY 6 HOURS PRN
Qty: 20 TABLET | Refills: 0 | Status: SHIPPED | OUTPATIENT
Start: 2024-01-26 | End: 2024-02-02

## 2024-01-26 NOTE — PROGRESS NOTES
Aim for at least 150 min exercise (walking/cardio) weekly.      Progress was reviewed with patient.    Goal(s) for next appointment:   -5 lbs    I have reviewed/discussed the above normal BMI with the patient.  I have recommended the following interventions: dietary management education, guidance, and counseling, encourage exercise, and monitor weight . .        The primary encounter diagnosis was Class 1 obesity due to excess calories without serious comorbidity with body mass index (BMI) of 33.0 to 33.9 in adult. Diagnoses of Nausea, Encounter for dietary counseling and surveillance, Encounter for weight loss counseling, S/P gastric bypass, and Follow-up encounter involving medication were also pertinent to this visit.      Return in about 6 weeks (around 3/8/2024) for Weight Management.       HUMBERTO DanielC

## 2024-02-01 ENCOUNTER — CLINICAL DOCUMENTATION (OUTPATIENT)
Age: 34
End: 2024-02-01

## 2024-02-01 ASSESSMENT — ENCOUNTER SYMPTOMS
NAUSEA: 0
SHORTNESS OF BREATH: 0
DIARRHEA: 0
VOMITING: 0
COUGH: 0
CONSTIPATION: 0
ABDOMINAL PAIN: 1

## 2024-02-02 ENCOUNTER — TELEPHONE (OUTPATIENT)
Age: 34
End: 2024-02-02

## 2024-02-05 ENCOUNTER — TELEPHONE (OUTPATIENT)
Age: 34
End: 2024-02-05

## 2024-02-07 ENCOUNTER — TELEPHONE (OUTPATIENT)
Age: 34
End: 2024-02-07

## 2024-02-20 NOTE — TELEPHONE ENCOUNTER
PA determination approved Key: [id number]   Patient aware.    Outcome  Approved on February 1  Your PA request has been approved. Additional information will be provided in the approval communication. (Message 1144)  Authorization Expiration Date: 1/31/2025

## 2024-02-21 ENCOUNTER — TELEMEDICINE (OUTPATIENT)
Age: 34
End: 2024-02-21
Payer: OTHER GOVERNMENT

## 2024-02-21 ENCOUNTER — TELEPHONE (OUTPATIENT)
Age: 34
End: 2024-02-21

## 2024-02-21 VITALS — HEIGHT: 65 IN | BODY MASS INDEX: 32.32 KG/M2 | WEIGHT: 194 LBS

## 2024-02-21 DIAGNOSIS — Z71.3 ENCOUNTER FOR WEIGHT LOSS COUNSELING: ICD-10-CM

## 2024-02-21 DIAGNOSIS — E66.09 CLASS 1 OBESITY DUE TO EXCESS CALORIES WITHOUT SERIOUS COMORBIDITY WITH BODY MASS INDEX (BMI) OF 33.0 TO 33.9 IN ADULT: ICD-10-CM

## 2024-02-21 DIAGNOSIS — Z79.899 FOLLOW-UP ENCOUNTER INVOLVING MEDICATION: ICD-10-CM

## 2024-02-21 DIAGNOSIS — Z71.3 ENCOUNTER FOR DIETARY COUNSELING AND SURVEILLANCE: ICD-10-CM

## 2024-02-21 DIAGNOSIS — R11.0 NAUSEA: ICD-10-CM

## 2024-02-21 DIAGNOSIS — E66.9 CLASS 1 OBESITY WITHOUT SERIOUS COMORBIDITY WITH BODY MASS INDEX (BMI) OF 32.0 TO 32.9 IN ADULT, UNSPECIFIED OBESITY TYPE: Primary | ICD-10-CM

## 2024-02-21 PROCEDURE — 99213 OFFICE O/P EST LOW 20 MIN: CPT | Performed by: NURSE PRACTITIONER

## 2024-02-21 RX ORDER — PROMETHAZINE HYDROCHLORIDE 25 MG/1
25 TABLET ORAL EVERY 6 HOURS PRN
Qty: 30 TABLET | Refills: 1 | Status: SHIPPED | OUTPATIENT
Start: 2024-02-21

## 2024-02-21 RX ORDER — PROMETHAZINE HYDROCHLORIDE 25 MG/1
25 TABLET ORAL 3 TIMES DAILY
COMMUNITY
Start: 2024-01-21 | End: 2024-02-21 | Stop reason: SDUPTHER

## 2024-02-21 RX ORDER — PROMETHAZINE HYDROCHLORIDE 25 MG/1
25 TABLET ORAL EVERY 6 HOURS PRN
Qty: 30 TABLET | Refills: 1 | Status: SHIPPED | OUTPATIENT
Start: 2024-02-21 | End: 2024-02-21 | Stop reason: SDUPTHER

## 2024-02-21 NOTE — TELEPHONE ENCOUNTER
Patient informed office that promethazine was sent to [facility] however it should of been the Zepbound 5mg that should have been sent over, if this can be corrected.

## 2024-02-21 NOTE — TELEPHONE ENCOUNTER
Patient needs medication sent over to different pharmacy  [facility]  [address]  [facility] [address]

## 2024-02-21 NOTE — PROGRESS NOTES
Brittany Cisse, was evaluated through a synchronous (real-time) audio-video encounter. The patient (or guardian if applicable) is aware that this is a billable service, which includes applicable co-pays. This Virtual Visit was conducted with patient's (and/or legal guardian's) consent. The visit was conducted pursuant to the emergency declaration under the [place] Act and the National Emergencies Act, 1135 waiver authority and the Coronavirus Preparedness and Response Supplemental Appropriations Act. Patient identification was verified, and a caregiver was present when appropriate. The patient was located in a state where the provider was licensed to provide care.     Provider location while conducting visit: Office  Patient location: Home  Other person participating in the telehealth services: LAM Gaspar MA  This virtual visit was conducted via interactive/real-time audio/video using Epic Telehealth ([employer])  Visit start: 0829  Visit end: 0841    [facility] Progress Note:   F/up Provider Visit    CC: Weight Management    Brittany Cisse is a 33 y.o. female who is here for her f/up medical provider visit for the Medication program.         2/26/2024     1:51 PM   Non-Surgical Weight Loss Tracker   Consult Date 8/17/2023   Initial Height 5' 5\"   Initial Weight 213 lb   Ideal Body Weight 134 lb   Initial BMI 35.44   Initial Body Fat % 42.53   EBW 79 lb   Date 2/21/2024   Height 5' 5\"   Weight 194 lb   BMI 32.28   Weight Change 194 lb   Total Weight Change 0 lb   % EBWL <UNK>%   Subsequent Body Fat % 38.74   Body Fat % Change 38.74   General Comments Zepbound        Arm: Virtual visit, Unable to obtain  Waist: Virtual visit, Unable to obtain  Thigh: Virtual visit, Unable to obtain  Body Fat Percentage: Virtual visit, Unable to obtain    Body composition scanned to media    Down 2 lbs  since last visit. Down 9 lbs since starting program on 8/17/2023 .    Goal wt: 185    Ideal body  PROCEDURE:  Cervical Spine 2 View

 

INDICATIONS:  Compression fracture of cervical vertebra with routine healing, unspecified cervical ve
rtebral level

 

TECHNIQUE:  4 view(s) of the cervical spine were acquired.  

 

COMPARISON:  None.

 

FINDINGS:  

 

Bones: Loss of the normal cervical lordosis, which could be related to muscle strain or spasm or the 
neck brace. Mild compression deformity of C6, about one quarter vertebral body height loss. 2 mm retr
olisthesis C3 on C4. Mild multilevel degenerative changes present.

 

Soft tissues:  No prevertebral soft tissue swelling.  

 

IMPRESSION:  

1. Mild compression deformity of C6.

2. Multilevel degenerative changes.

 

Reviewed by: Que Marlow MD on 12/22/2022 8:39 AM PST

Approved by: Que Marlow MD on 12/22/2022 8:39 AM PST

 

 

Station ID:  [id number]

## 2024-02-22 RX ORDER — TIRZEPATIDE 5 MG/.5ML
5 INJECTION, SOLUTION SUBCUTANEOUS
Qty: 2 ML | Refills: 0 | Status: SHIPPED | OUTPATIENT
Start: 2024-02-22

## 2024-02-26 ASSESSMENT — ENCOUNTER SYMPTOMS
VOMITING: 0
ABDOMINAL PAIN: 0
COUGH: 0
DIARRHEA: 1
NAUSEA: 1
CONSTIPATION: 0
SHORTNESS OF BREATH: 0

## 2024-03-18 ENCOUNTER — PATIENT MESSAGE (OUTPATIENT)
Age: 34
End: 2024-03-18

## 2024-03-18 DIAGNOSIS — E66.09 CLASS 1 OBESITY DUE TO EXCESS CALORIES WITHOUT SERIOUS COMORBIDITY WITH BODY MASS INDEX (BMI) OF 33.0 TO 33.9 IN ADULT: Primary | ICD-10-CM

## 2024-03-19 RX ORDER — TIRZEPATIDE 7.5 MG/.5ML
7.5 INJECTION, SOLUTION SUBCUTANEOUS
Qty: 2 ML | Refills: 0 | Status: SHIPPED | OUTPATIENT
Start: 2024-03-19

## 2024-03-19 RX ORDER — TIRZEPATIDE 5 MG/.5ML
5 INJECTION, SOLUTION SUBCUTANEOUS
Qty: 2 ML | Refills: 0 | Status: SHIPPED | OUTPATIENT
Start: 2024-03-19

## 2024-04-03 ENCOUNTER — TELEMEDICINE (OUTPATIENT)
Age: 34
End: 2024-04-03
Payer: OTHER GOVERNMENT

## 2024-04-03 DIAGNOSIS — Z71.3 ENCOUNTER FOR DIETARY COUNSELING AND SURVEILLANCE: ICD-10-CM

## 2024-04-03 DIAGNOSIS — Z79.899 FOLLOW-UP ENCOUNTER INVOLVING MEDICATION: ICD-10-CM

## 2024-04-03 DIAGNOSIS — Z71.3 ENCOUNTER FOR WEIGHT LOSS COUNSELING: ICD-10-CM

## 2024-04-03 DIAGNOSIS — E66.09 CLASS 1 OBESITY DUE TO EXCESS CALORIES WITHOUT SERIOUS COMORBIDITY WITH BODY MASS INDEX (BMI) OF 33.0 TO 33.9 IN ADULT: Primary | ICD-10-CM

## 2024-04-03 PROCEDURE — 99213 OFFICE O/P EST LOW 20 MIN: CPT | Performed by: NURSE PRACTITIONER

## 2024-04-03 RX ORDER — TIRZEPATIDE 7.5 MG/.5ML
7.5 INJECTION, SOLUTION SUBCUTANEOUS
Qty: 2 ML | Refills: 1 | Status: SHIPPED | OUTPATIENT
Start: 2024-04-03

## 2024-04-03 RX ORDER — TIRZEPATIDE 7.5 MG/.5ML
7.5 INJECTION, SOLUTION SUBCUTANEOUS
Qty: 2 ML | Refills: 1 | Status: SHIPPED | OUTPATIENT
Start: 2024-04-03 | End: 2024-04-03 | Stop reason: SDUPTHER

## 2024-04-03 NOTE — PROGRESS NOTES
Brittany Cisse, was evaluated through a synchronous (real-time) audio-video encounter. The patient (or guardian if applicable) is aware that this is a billable service, which includes applicable co-pays. This Virtual Visit was conducted with patient's (and/or legal guardian's) consent. Patient identification was verified, and a caregiver was present when appropriate.   The patient was located at Home: [address] [place] [address] [facility] [address]  Provider was located at Facility (Appt Dept): [address] [place] [address]  [address]  [place] [address]  Confirm you are appropriately licensed, registered, or certified to deliver care in the state where the patient is located as indicated above. If you are not or unsure, please re-schedule the visit: Yes, I confirm.      Total time spent for this encounter: Not billed by time    --YUNG Cerda NP on 4/5/2024 at 7:52 AM    An electronic signature was used to authenticate this note.    [facility] Progress Note:   F/up Provider Visit    CC: Weight Management    Brittany Cisse is a 33 y.o. female who is here for her f/up medical provider visit for the Medication program.         2/26/2024     1:51 PM 4/3/2024     8:46 AM   Non-Surgical Weight Loss Tracker   Consult Date 8/17/2023 8/17/2023   Initial Height 5' 5\" 5' 5\"   Initial Weight 213 lb 213 lb   Ideal Body Weight 134 lb    Initial BMI 35.44 35.44   Initial Body Fat % 42.53 42.53   EBW 79 lb    Date 2/21/2024 4/3/2024   Height 5' 5\" 5' 5\"   Weight 194 lb 186 lb   BMI 32.28 30.95   Weight Change 194 lb -27 lb   Total Weight Change 0 lb -27 lb   % EBWL <UNK>% 34%   Subsequent Body Fat % 38.74 37.14   Body Fat % Change 38.74 -1.6   General Comments Zepbound Zepbound 5mg        Arm: UTO  Waist: UTO  Thigh: UTO  Body Fat Percentage: UTO    Down 8 lbs since last visit. Down 27 lbs since starting program on 8/17/2023 .    Goal wt: 170 lbs    Patient weight not recorded  There is

## 2024-04-05 ASSESSMENT — ENCOUNTER SYMPTOMS
COUGH: 0
DIARRHEA: 0
CONSTIPATION: 0
NAUSEA: 0

## 2024-04-09 DIAGNOSIS — E66.09 CLASS 1 OBESITY DUE TO EXCESS CALORIES WITHOUT SERIOUS COMORBIDITY WITH BODY MASS INDEX (BMI) OF 33.0 TO 33.9 IN ADULT: ICD-10-CM

## 2024-04-10 DIAGNOSIS — R11.0 NAUSEA: ICD-10-CM

## 2024-04-15 RX ORDER — PROMETHAZINE HYDROCHLORIDE 25 MG/1
25 TABLET ORAL EVERY 6 HOURS PRN
Qty: 30 TABLET | Refills: 1 | Status: SHIPPED | OUTPATIENT
Start: 2024-04-15

## 2024-04-15 RX ORDER — TIRZEPATIDE 7.5 MG/.5ML
7.5 INJECTION, SOLUTION SUBCUTANEOUS
Qty: 2 ML | Refills: 1 | OUTPATIENT
Start: 2024-04-15

## 2024-04-15 RX ORDER — PROMETHAZINE HYDROCHLORIDE 25 MG/1
25 TABLET ORAL EVERY 6 HOURS PRN
Qty: 30 TABLET | Refills: 1 | OUTPATIENT
Start: 2024-04-15 | End: 2024-04-15 | Stop reason: SDUPTHER

## 2024-04-25 ENCOUNTER — PATIENT MESSAGE (OUTPATIENT)
Age: 34
End: 2024-04-25

## 2024-05-02 ENCOUNTER — TELEPHONE (OUTPATIENT)
Age: 34
End: 2024-05-02

## 2024-05-02 RX ORDER — TIRZEPATIDE 2.5 MG/.5ML
2.5 INJECTION, SOLUTION SUBCUTANEOUS
Qty: 2 ML | Refills: 1 | Status: SHIPPED | OUTPATIENT
Start: 2024-05-02

## 2024-05-02 NOTE — TELEPHONE ENCOUNTER
Pt is requesting rx for zepbound 2.5 mg to be sent into [facility].  This is what she was able to find and I have changed her pharmacy to there.  Looks sharmin she had been on 5 milligrams.   Thank you!

## 2024-05-09 NOTE — TELEPHONE ENCOUNTER
9/14/2021:  Contacted patient today at 1:45 PM  in reference to: assessment of pts tolerance on liquid diet and readiness for diet advancement tomorrow after she sees Monica Warren NP. Patient was left a voicemail to contact me. My contact information was provided.     Yoko Roberson MS RD/LD
N/A

## 2024-05-21 DIAGNOSIS — E66.09 CLASS 1 OBESITY DUE TO EXCESS CALORIES WITHOUT SERIOUS COMORBIDITY WITH BODY MASS INDEX (BMI) OF 33.0 TO 33.9 IN ADULT: ICD-10-CM

## 2024-05-31 RX ORDER — TIRZEPATIDE 5 MG/.5ML
5 INJECTION, SOLUTION SUBCUTANEOUS
Qty: 2 ML | Refills: 1 | Status: SHIPPED | OUTPATIENT
Start: 2024-05-31

## 2024-06-28 RX ORDER — TIRZEPATIDE 2.5 MG/.5ML
2.5 INJECTION, SOLUTION SUBCUTANEOUS
Qty: 4 ML | Refills: 0 | Status: SHIPPED | OUTPATIENT
Start: 2024-06-28

## 2024-07-09 DIAGNOSIS — E66.09 CLASS 1 OBESITY DUE TO EXCESS CALORIES WITHOUT SERIOUS COMORBIDITY WITH BODY MASS INDEX (BMI) OF 33.0 TO 33.9 IN ADULT: ICD-10-CM

## 2024-07-09 RX ORDER — TIRZEPATIDE 5 MG/.5ML
INJECTION, SOLUTION SUBCUTANEOUS
Qty: 2 ML | Refills: 1 | OUTPATIENT
Start: 2024-07-09

## 2024-07-15 DIAGNOSIS — R11.0 NAUSEA: ICD-10-CM

## 2024-07-18 DIAGNOSIS — R11.0 NAUSEA: ICD-10-CM

## 2024-07-19 RX ORDER — PROMETHAZINE HYDROCHLORIDE 25 MG/1
25 TABLET ORAL EVERY 6 HOURS PRN
Qty: 30 TABLET | Refills: 1 | Status: SHIPPED | OUTPATIENT
Start: 2024-07-19

## 2024-07-23 RX ORDER — PROMETHAZINE HYDROCHLORIDE 25 MG/1
25 TABLET ORAL EVERY 6 HOURS PRN
Qty: 30 TABLET | Refills: 0 | OUTPATIENT
Start: 2024-07-23

## 2024-08-08 ENCOUNTER — PATIENT MESSAGE (OUTPATIENT)
Age: 34
End: 2024-08-08

## 2024-08-08 ENCOUNTER — TELEMEDICINE (OUTPATIENT)
Age: 34
End: 2024-08-08
Payer: COMMERCIAL

## 2024-08-08 DIAGNOSIS — Z71.3 ENCOUNTER FOR WEIGHT LOSS COUNSELING: ICD-10-CM

## 2024-08-08 DIAGNOSIS — E66.9 CLASS 1 OBESITY WITHOUT SERIOUS COMORBIDITY WITH BODY MASS INDEX (BMI) OF 32.0 TO 32.9 IN ADULT, UNSPECIFIED OBESITY TYPE: Primary | ICD-10-CM

## 2024-08-08 DIAGNOSIS — R11.0 NAUSEA: ICD-10-CM

## 2024-08-08 DIAGNOSIS — Z79.899 FOLLOW-UP ENCOUNTER INVOLVING MEDICATION: ICD-10-CM

## 2024-08-08 PROCEDURE — 99213 OFFICE O/P EST LOW 20 MIN: CPT | Performed by: NURSE PRACTITIONER

## 2024-08-08 PROCEDURE — G8428 CUR MEDS NOT DOCUMENT: HCPCS | Performed by: NURSE PRACTITIONER

## 2024-08-08 RX ORDER — PROMETHAZINE HYDROCHLORIDE 25 MG/1
25 TABLET ORAL EVERY 6 HOURS PRN
Qty: 30 TABLET | Refills: 1 | Status: SHIPPED | OUTPATIENT
Start: 2024-08-08

## 2024-08-08 RX ORDER — TIRZEPATIDE 10 MG/.5ML
10 INJECTION, SOLUTION SUBCUTANEOUS
Qty: 2 ML | Refills: 1 | Status: SHIPPED | OUTPATIENT
Start: 2024-08-08

## 2024-08-08 ASSESSMENT — ENCOUNTER SYMPTOMS
NAUSEA: 0
CONSTIPATION: 0
VOMITING: 0
COUGH: 0
ABDOMINAL PAIN: 0
DIARRHEA: 0
SHORTNESS OF BREATH: 0

## 2024-08-08 NOTE — PROGRESS NOTES
Brittany LAM Cisse, was evaluated through a synchronous (real-time) audio-video encounter. The patient (or guardian if applicable) is aware that this is a billable service, which includes applicable co-pays. This Virtual Visit was conducted with patient's (and/or legal guardian's) consent. Patient identification was verified, and a caregiver was present when appropriate.   The patient was located at Home: [address] [place] [address] [facility] [address]  Provider was located at Facility (Appt Dept): [address] [place] [address]  [address]  [place] [address]  Confirm you are appropriately licensed, registered, or certified to deliver care in the state where the patient is located as indicated above. If you are not or unsure, please re-schedule the visit: Yes, I confirm.      Total time spent for this encounter: Not billed by time    --YUNG Cerda NP on 8/8/2024 at 10:10 AM    An electronic signature was used to authenticate this note.    [facility] Progress Note:   F/up Provider Visit    CC: Weight Management    History of Present Illness  The patient is a 33-year-old female presenting for weight management. She is currently on Zepbound 7.5 mg.    She has been consistently taking Zepbound 7.5 mg, with only a brief interruption of 1 to 2 weeks since our last visit in April. The medication has been effective in managing her weight. Despite a recent plateau in her weight loss, she has lost 2 pounds since April 2024. She will occasionally experience some nausea but this is manageable with phenergan as needed. She has lost 13 lbs since starting Zepbound which is 7% of her starting weight of 197 lbs.     Her diet is rich in protein, and she maintains an active lifestyle, exercising for at least 30 minutes, 4 to 5 times a week. This represents an increase in physical activity compared to her routine in April 2024.     Her fluid intake is adequate, consuming 70 to 80 ounces of water daily,

## 2024-08-08 NOTE — PROGRESS NOTES
Chief Complaint   Patient presents with    Follow-up     Gastric bypass 2007 2018 revisional bypass procedure, 2021 band over bypass    1. Have you been to the ER, urgent care clinic since your last visit?  Hospitalized since your last visit?No    2. Have you seen or consulted any other health care providers outside of the [facility] since your last visit?  Include any pap smears or colon screening. Yes oncology iron infusions    [facility] Nurse Note:       CC: Weight Management    Brittany Cisse is a 33 y.o. female who is here for her f/up medical provider visit for the Medication program.       Did you have any problems adhering to the program since last visit? No  If yes, please explain:     Since your last visit, have you experienced any complications? No    Have you received any other medical care since last visit? Yes  If yes, where and for what?     Have you had any change in your medications since your last visit? No If yes what? Just received iron infusions    Are you taking an anti-obesity medication? Yes If yes what and are you experiencing any side effects?  A little nausea at night phenergan helps    Would you still like to continue your current medication and dosage? Yes    BP Readings from Last 3 Encounters:   11/17/23 (!) 110/53   10/10/23 115/71   09/14/23 106/74        Eating Habits Over Last Week:    How many oz of sugar-free fluids are you consuming? 64    Did you consume your prescribed meal replacement regimen each day? Yes    Physical Activity Routine:    Aerobic exercise: 40 min 4-5 times a week    Resistance exercise: 0

## 2024-08-08 NOTE — PROGRESS NOTES
Chief Complaint   Patient presents with    Follow-up     Gastric bypass 2007 2018 revisional bypass procedure, 2021 band over bypass    1. Have you been to the ER, urgent care clinic since your last visit?  Hospitalized since your last visit?No    2. Have you seen or consulted any other health care providers outside of the [facility] since your last visit?  Include any pap smears or colon screening. [place] oncology iron infusions   Bariatric Postoperative Nurse Note      Brittany Cisse is a 33 y.o. female status post  gastric bypass 2007  performed on 2007.    All Post-Ops (including two weeks)  -# of grams of protein daily? 65-70  -sources of protein? Shake, chicken cheese  -# of oz of sugar free fluids from all sources daily?  64  -Nausea? Yes 4-5 nights a week some in evening  -Vomiting? No  -Difficulty swallow/food sticking? No  -Heartburn/regurgitation? Yes pepcid works  -Character of bowel movements (diarrhea/constipation/bloody stools?) regular  -Which multivitamin product are you taking?  Womens mvi     -What dose and how frequently are you taking calcium citrate? no  - from any iron-containing multivitamin by 2 hours? Is aware  -Ulcer risk exposures:   NSAID No  Tobacco No  Alcohol No  Steroids No  -Minutes of physical activity and what type? 40 min 4-5 times a week

## 2024-10-01 DIAGNOSIS — R11.0 NAUSEA: ICD-10-CM

## 2024-10-01 DIAGNOSIS — E66.811 CLASS 1 OBESITY WITHOUT SERIOUS COMORBIDITY WITH BODY MASS INDEX (BMI) OF 32.0 TO 32.9 IN ADULT, UNSPECIFIED OBESITY TYPE: Primary | ICD-10-CM

## 2024-10-01 RX ORDER — TIRZEPATIDE 10 MG/.5ML
10 INJECTION, SOLUTION SUBCUTANEOUS
Qty: 2 ML | Refills: 1 | OUTPATIENT
Start: 2024-10-01

## 2024-10-01 RX ORDER — TIRZEPATIDE 10 MG/.5ML
10 INJECTION, SOLUTION SUBCUTANEOUS
Qty: 2 ML | Refills: 1 | Status: SHIPPED | OUTPATIENT
Start: 2024-10-01

## 2024-10-01 RX ORDER — PROMETHAZINE HYDROCHLORIDE 25 MG/1
25 TABLET ORAL EVERY 6 HOURS PRN
Qty: 30 TABLET | Refills: 1 | Status: SHIPPED | OUTPATIENT
Start: 2024-10-01

## 2024-11-21 ENCOUNTER — TELEMEDICINE (OUTPATIENT)
Age: 34
End: 2024-11-21
Payer: COMMERCIAL

## 2024-11-21 VITALS — HEIGHT: 65 IN | WEIGHT: 176 LBS | BODY MASS INDEX: 29.32 KG/M2

## 2024-11-21 DIAGNOSIS — Z71.3 ENCOUNTER FOR WEIGHT LOSS COUNSELING: ICD-10-CM

## 2024-11-21 DIAGNOSIS — E66.3 OVERWEIGHT: Primary | ICD-10-CM

## 2024-11-21 DIAGNOSIS — Z79.899 FOLLOW-UP ENCOUNTER INVOLVING MEDICATION: ICD-10-CM

## 2024-11-21 DIAGNOSIS — R11.0 NAUSEA: ICD-10-CM

## 2024-11-21 DIAGNOSIS — Z86.39 HX OF OBESITY: ICD-10-CM

## 2024-11-21 PROCEDURE — G8428 CUR MEDS NOT DOCUMENT: HCPCS | Performed by: NURSE PRACTITIONER

## 2024-11-21 PROCEDURE — 99214 OFFICE O/P EST MOD 30 MIN: CPT | Performed by: NURSE PRACTITIONER

## 2024-11-21 RX ORDER — TIRZEPATIDE 12.5 MG/.5ML
12.5 INJECTION, SOLUTION SUBCUTANEOUS
Qty: 2 ML | Refills: 2 | Status: SHIPPED | OUTPATIENT
Start: 2024-11-21

## 2024-11-21 RX ORDER — PROMETHAZINE HYDROCHLORIDE 25 MG/1
25 TABLET ORAL EVERY 6 HOURS PRN
Qty: 30 TABLET | Refills: 1 | Status: SHIPPED | OUTPATIENT
Start: 2024-11-21

## 2024-11-21 NOTE — PROGRESS NOTES
[facility] Nurse Note:       CC: Weight Management    Brittany Cisse is a 34 y.o. female who is here for her f/up medical provider visit for the Medication program.       Did you have any problems adhering to the program since last visit? No  If yes, please explain:     Since your last visit, have you experienced any complications? No    Have you received any other medical care since last visit? Yes  If yes, where and for what?     Have you had any change in your medications since your last visit? No If yes what? Just received iron infusions    Are you taking an anti-obesity medication? Yes If yes what and are you experiencing any side effects? Zepbound 10mg with minimal nausea.    Would you still like to continue your current medication and dosage? Yes    BP Readings from Last 3 Encounters:   11/17/23 (!) 110/53   10/10/23 115/71   09/14/23 106/74        Eating Habits Over Last Week:    How many oz of sugar-free fluids are you consuming? 64oz daily.    Did you consume your prescribed meal replacement regimen each day? Yes    Physical Activity Routine:    Aerobic exercise: 40 min 4-5 times a week    Resistance exercise: 0    
Maternal Grandmother     No Known Problems Maternal Grandfather     No Known Problems Paternal Grandmother     No Known Problems Paternal Grandfather     No Known Problems Paternal Aunt        Family Status   Relation Name Status    Mother  Alive    Father  Alive    Sister  (Not Specified)    Brother  Alive    MAunt  (Not Specified)    MUnc  (Not Specified)    Other  (Not Specified)    PUnc  (Not Specified)    MGM  (Not Specified)    MGF  (Not Specified)    PGM  (Not Specified)    PGF  (Not Specified)    PAunt  (Not Specified)    Brother  Alive   No partnership data on file       Review of Systems  Review of Systems   Constitutional:  Negative for chills and fever.   Respiratory:  Negative for cough and shortness of breath.    Cardiovascular:  Negative for chest pain and palpitations.   Gastrointestinal:  Negative for abdominal pain, constipation, diarrhea, nausea and vomiting.   Neurological: Negative.          Objective  Vitals:    11/21/24 0930   Weight: 79.8 kg (176 lb)   Height: 1.651 m (5' 5\")      No LMP recorded.    Physical Exam  Physical Exam  Vitals and nursing note reviewed.   Constitutional:       Appearance: Normal appearance.   HENT:      Head: Normocephalic and atraumatic.   Neurological:      General: No focal deficit present.      Mental Status: She is alert and oriented to person, place, and time.   Psychiatric:         Mood and Affect: Mood normal.         Behavior: Behavior normal.         No results found for this or any previous visit (from the past 672 hour(s)).      Assessment & Plan  1. Weight management.  She has lost 8 pounds since the last visit in August and is now down to 176 lbs. She aims to reach the 160s to get out of the overweight BMI category. She is advised to continue her current regimen, including getting in adequate protein, fluids, and exercise 4-5 times a week for at least 30 minutes. A prescription for Zepbound 12.5 mg will be sent to [facility]. If the pharmacy does not

## 2024-11-23 ASSESSMENT — ENCOUNTER SYMPTOMS
SHORTNESS OF BREATH: 0
ABDOMINAL PAIN: 0
COUGH: 0
NAUSEA: 0
DIARRHEA: 0
VOMITING: 0
CONSTIPATION: 0

## 2025-03-04 DIAGNOSIS — R11.0 NAUSEA: ICD-10-CM

## 2025-03-04 DIAGNOSIS — E66.3 OVERWEIGHT: ICD-10-CM

## 2025-03-04 DIAGNOSIS — Z86.39 HX OF OBESITY: ICD-10-CM

## 2025-03-06 ENCOUNTER — CLINICAL DOCUMENTATION (OUTPATIENT)
Age: 35
End: 2025-03-06

## 2025-03-06 NOTE — PROGRESS NOTES
As of 3/4/2025:  Height: 5'5\"  Weight: 171 lbs  BMI: 28.5    Doing well on Zepbound 12.5 mg, denies any side effects and desires to continue. She continues to follow a high protein/low carb/low calorie (<1400) diet and at least 150 minutes of exercise weekly.     She has lost 26 lbs since starting Zepbound which is 13% of her starting weight of 197 lbs.

## 2025-03-07 DIAGNOSIS — E66.3 OVERWEIGHT: ICD-10-CM

## 2025-03-07 DIAGNOSIS — R11.0 NAUSEA: ICD-10-CM

## 2025-03-07 DIAGNOSIS — Z86.39 HX OF OBESITY: ICD-10-CM

## 2025-03-07 NOTE — PROGRESS NOTES
Prior Authorization for  Zepbound 12.5 was submitted via [facility] to insurance [employer] [facility].     [facility] SANDERSON/CASE ID: [id number]  STATUS: Pending, waiting response from policy.  PATIENT NOTIFIED via N/A

## 2025-03-10 RX ORDER — TIRZEPATIDE 12.5 MG/.5ML
12.5 INJECTION, SOLUTION SUBCUTANEOUS
Qty: 2 ML | Refills: 2 | Status: SHIPPED | OUTPATIENT
Start: 2025-03-10

## 2025-03-10 RX ORDER — TIRZEPATIDE 12.5 MG/.5ML
12.5 INJECTION, SOLUTION SUBCUTANEOUS
Qty: 2 ML | Refills: 2 | OUTPATIENT
Start: 2025-03-10

## 2025-03-10 RX ORDER — PROMETHAZINE HYDROCHLORIDE 25 MG/1
25 TABLET ORAL EVERY 6 HOURS PRN
Qty: 30 TABLET | Refills: 1 | OUTPATIENT
Start: 2025-03-10

## 2025-03-10 RX ORDER — PROMETHAZINE HYDROCHLORIDE 25 MG/1
25 TABLET ORAL EVERY 6 HOURS PRN
Qty: 30 TABLET | Refills: 1 | Status: SHIPPED | OUTPATIENT
Start: 2025-03-10

## 2025-04-30 ENCOUNTER — PATIENT MESSAGE (OUTPATIENT)
Age: 35
End: 2025-04-30

## 2025-04-30 ENCOUNTER — TELEPHONE (OUTPATIENT)
Age: 35
End: 2025-04-30

## 2025-04-30 DIAGNOSIS — Z86.39 HX OF OBESITY: ICD-10-CM

## 2025-04-30 DIAGNOSIS — E66.3 OVERWEIGHT: Primary | ICD-10-CM

## 2025-04-30 DIAGNOSIS — Z71.3 ENCOUNTER FOR WEIGHT LOSS COUNSELING: ICD-10-CM

## 2025-04-30 DIAGNOSIS — Z79.899 FOLLOW-UP ENCOUNTER INVOLVING MEDICATION: ICD-10-CM

## 2025-04-30 RX ORDER — TIRZEPATIDE 15 MG/.5ML
15 INJECTION, SOLUTION SUBCUTANEOUS
Qty: 2 ML | Refills: 1 | Status: SHIPPED | OUTPATIENT
Start: 2025-04-30

## 2025-04-30 NOTE — TELEPHONE ENCOUNTER
Provider out of office. Agreeable to increase dose of Zepbound. Titrate as directed. Order 15 mg dose.     She has lost 21 lbs since starting Zepbound which is 11% of her starting weight of 197 lbs.     Chronic weight management (Zepbound): SubQ: Initial: 2.5 mg once weekly for 4 weeks; will titrate as directed. If the patient cannot tolerate an increased dose during dose escalation, consider delaying dose escalation for one additional week. Note: Evaluate change in body weight 16 weeks after initiation of therapy; discontinue if at least 5% of baseline body weight loss has not been achieved.  SubQ: Inject subcutaneously in the upper arm, thigh, or abdomen. Do not inject intravenously or intramuscularly. Administer without regard to meals or time of day. Discard pen in an appropriate sharps disposal container after each administration. Use only if clear, colorless, and free of particulate matter. Alternate injection sites. Do not share pens between patients. If using concomitantly with insulin, administer as separate injections (do not mix); may inject in the same body region as insulin, but not adjacent to one another.    Adverse reactions, contraindications, warnings, and precautions according to the rx website reviewed with pt. Denies personal or family hx of medullary thyroid cancer. Pt aware of risk of hypoglycemia and will monitor. Will notify me of any SE/SS.     If of childbearing age and you are on oral contraceptive pills, you may need to use a barrier method for 4 weeks after using medication as it may impact absorption of the hormones, and Plan B may not be effective.

## 2025-05-13 DIAGNOSIS — R11.0 NAUSEA: ICD-10-CM

## 2025-05-16 RX ORDER — PROMETHAZINE HYDROCHLORIDE 25 MG/1
25 TABLET ORAL EVERY 6 HOURS PRN
Qty: 30 TABLET | Refills: 1 | Status: SHIPPED | OUTPATIENT
Start: 2025-05-16

## 2025-06-30 DIAGNOSIS — Z86.39 HX OF OBESITY: ICD-10-CM

## 2025-06-30 DIAGNOSIS — E66.3 OVERWEIGHT: ICD-10-CM

## 2025-06-30 DIAGNOSIS — Z79.899 FOLLOW-UP ENCOUNTER INVOLVING MEDICATION: ICD-10-CM

## 2025-06-30 DIAGNOSIS — Z71.3 ENCOUNTER FOR WEIGHT LOSS COUNSELING: ICD-10-CM

## 2025-07-02 DIAGNOSIS — Z86.39 HX OF OBESITY: ICD-10-CM

## 2025-07-02 DIAGNOSIS — Z79.899 FOLLOW-UP ENCOUNTER INVOLVING MEDICATION: ICD-10-CM

## 2025-07-02 DIAGNOSIS — Z71.3 ENCOUNTER FOR WEIGHT LOSS COUNSELING: ICD-10-CM

## 2025-07-02 DIAGNOSIS — E66.3 OVERWEIGHT: ICD-10-CM

## 2025-07-02 RX ORDER — TIRZEPATIDE 15 MG/.5ML
INJECTION, SOLUTION SUBCUTANEOUS
Qty: 4 ML | Refills: 0 | OUTPATIENT
Start: 2025-07-02

## 2025-07-02 RX ORDER — TIRZEPATIDE 15 MG/.5ML
15 INJECTION, SOLUTION SUBCUTANEOUS
Qty: 2 ML | Refills: 1 | OUTPATIENT
Start: 2025-07-02

## 2025-07-02 NOTE — TELEPHONE ENCOUNTER
Patient frustrated with program changes, declined to schedule follow up. Patient was provided 's number.

## 2025-08-01 ENCOUNTER — CLINICAL DOCUMENTATION (OUTPATIENT)
Age: 35
End: 2025-08-01

## 2025-08-01 NOTE — PROGRESS NOTES
Patient is no longer an active patient with the [facility].       All future appointments, if applicable, with [facility] provider will be automatically cancelled.  Patient is no longer eligible to purchase [facility] products.   All unused services are forfeit.  There may be a waiting period and renewal fee required for re-entry into the program.      No future appointments.

## (undated) DEVICE — MAJ-1414 SINGLE USE ADPATER BIOPSY VALV: Brand: SINGLE USE ADAPTOR BIOPSY VALVE

## (undated) DEVICE — Device

## (undated) DEVICE — REM POLYHESIVE ADULT PATIENT RETURN ELECTRODE: Brand: VALLEYLAB

## (undated) DEVICE — TROCAR LAP L100MM DIA5MM BLDELSS W/ STBL SL ENDOPATH XCEL

## (undated) DEVICE — ENDOCUT SCISSOR TIP, DISPOSABLE: Brand: RENEW

## (undated) DEVICE — SINGLE PORT MANIFOLD: Brand: NEPTUNE 2

## (undated) DEVICE — DRESSING,GAUZE,XEROFORM,CURAD,1"X8",ST: Brand: CURAD

## (undated) DEVICE — BARIATRIC: Brand: MEDLINE INDUSTRIES, INC.

## (undated) DEVICE — STERILE POLYISOPRENE POWDER-FREE SURGICAL GLOVES: Brand: PROTEXIS

## (undated) DEVICE — RELOAD STPL H1-2.5X45MM VASC THN TISS WHT 6 ROW B FRM SGL

## (undated) DEVICE — TRAP SPEC COLL POLYP POLYSTYR --

## (undated) DEVICE — BRUSH CYTO L240CM DIA2.3MM GI COLONOSCOPY 3 RNG HNDL RADPQ

## (undated) DEVICE — SUT MONOCRYL PLUS UD 4-0 --

## (undated) DEVICE — AGENT HEMSTAT W6XL9IN OXIDIZED REGENERATED CELOS ABSRB FOR

## (undated) DEVICE — SOLUTION IRRIG 500ML 0.9% SOD CHLO USP POUR PLAS BTL

## (undated) DEVICE — TROCAR ENDOSCP L100MM DIA15MM BLDELSS STBL SL ENDOPATH XCEL

## (undated) DEVICE — TROCAR ENDOSCP L100MM DIA12MM STBL SL BLDELSS ENDOPATH XCEL

## (undated) DEVICE — TIP APPL L35CM RIG FOR SEAL EVICEL

## (undated) DEVICE — TUBE KT ENDFLTN INSUFFLTN SVL --

## (undated) DEVICE — SOL IRRIGATION INJ NACL 0.9% 500ML BTL

## (undated) DEVICE — ENDO CARRY-ON PROCEDURE KIT INCLUDES ENZYMATIC SPONGE, GAUZE, BIOHAZARD LABEL, TRAY, LUBRICANT, DIRTY SCOPE LABEL, WATER LABEL, TRAY, DRAWSTRING PAD, AND DEFENDO 4-PIECE KIT.: Brand: ENDO CARRY-ON PROCEDURE KIT

## (undated) DEVICE — TROCAR ENDOSCP BLDELSS 12X100 MM W/ HNDL STBL SL OPT TIP

## (undated) DEVICE — SYRINGE MED 50ML LUERLOCK TIP

## (undated) DEVICE — RELOAD STPL L60MM H1-2.6MM MESENTERY THN TISS WHT 6 ROW

## (undated) DEVICE — SUTURE ETHIB EXCL BR GRN TAPR PT 2-0 30 X563H X563H

## (undated) DEVICE — KENDALL SCD EXPRESS SLEEVES, KNEE LENGTH, MEDIUM: Brand: KENDALL SCD

## (undated) DEVICE — GOWN ISOLATN REG BLU POLY UNISX W/ THMB LOOP

## (undated) DEVICE — DEVON™ KNEE AND BODY STRAP 60" X 3" (1.5 M X 7.6 CM): Brand: DEVON

## (undated) DEVICE — BINDER ABD H12IN FOR 30-45IN WAIST UNIV 4 PNL PREM DSGN E

## (undated) DEVICE — STRAP,POSITIONING,KNEE/BODY,FOAM,4X60": Brand: MEDLINE

## (undated) DEVICE — NDL PRT INJ NSAF BLNT 18GX1.5 --

## (undated) DEVICE — TROCAR RMFG CANN S-SLV 5X100MM --

## (undated) DEVICE — SUT ETHLN 3-0 18IN PS1 BLK --

## (undated) DEVICE — APPLIER CLP L SHFT DIA12MM 20 ROT MULT LIGACLP

## (undated) DEVICE — FORCEPS BX OVL CUP SERR DISP CAP L 240CM RAD JAW 4

## (undated) DEVICE — SUTURE PDS II SZ 0 L27IN ABSRB VLT L26MM CT-2 1/2 CIR Z334H

## (undated) DEVICE — SHEET,DRAPE,40X58,STERILE: Brand: MEDLINE

## (undated) DEVICE — SPONGE GZ W4XL4IN COT 12 PLY TYP VII WVN C FLD DSGN STERILE

## (undated) DEVICE — TRNQT TEXT 1X18IN BLU LF DISP -- CONVERT TO ITEM 362165

## (undated) DEVICE — INTENDED FOR TISSUE SEPARATION, AND OTHER PROCEDURES THAT REQUIRE A SHARP SURGICAL BLADE TO PUNCTURE OR CUT.: Brand: BARD-PARKER ® CARBON RIB-BACK BLADES

## (undated) DEVICE — GARMENT,MEDLINE,DVT,INT,CALF,MED, GEN2: Brand: MEDLINE

## (undated) DEVICE — MOUTHPIECE ENDOSCP 20X27MM --

## (undated) DEVICE — SUTURE MCRYL SZ 3-0 L27IN ABSRB UD L24MM PS-1 3/8 CIR PRIM Y936H

## (undated) DEVICE — BINDER PREMIER PRO ABD 10INCH UNIV LTX FREE

## (undated) DEVICE — MASK O2 O2 LN L7FT CO2 LN L10FT FEM BG 750ML M CONC ADPT

## (undated) DEVICE — SOLUTION SURG PREP 26 CC PURPREP

## (undated) DEVICE — SOLUTION LACTATED RINGERS INJECTION USP

## (undated) DEVICE — BAND RUB 1/8X2.5IN STRL --

## (undated) DEVICE — SYRINGE 20ML LL S/C 50

## (undated) DEVICE — AMD ANTIMICROBIAL DRAIN SPONGES, 6 PLY, 0.2% POLYHEXAMETHYLENE BIGUANIDE HCI (PHMB): Brand: EXCILON

## (undated) DEVICE — SUTURE STRATAFIX SPRL SZ 1 L5IN ABSRB VLT CT-1 L36MM 1/2 SXPD2B401

## (undated) DEVICE — CATH URETH INTMIT ROB 8FR FUN -- CONVERT TO ITEM 363103

## (undated) DEVICE — SYR 3ML LL TIP 1/10ML GRAD --

## (undated) DEVICE — ATTACHMENT SMK EVAC FOR ES PNCL ACCUVAC

## (undated) DEVICE — SUTURE ETHLN SZ 3-0 L30IN NONABSORBABLE BLK FSL L30MM 3/8 1671H

## (undated) DEVICE — NEEDLE INSUF L150MM DIA2MM DISP FOR PNEUMOPERI ENDOPATH

## (undated) DEVICE — MEDI-VAC NON-CONDUCTIVE SUCTION TUBING: Brand: CARDINAL HEALTH

## (undated) DEVICE — STAPLER SKIN L440MM 32MM LNG 12 FIRING B FRM PWR + GRIPPING

## (undated) DEVICE — ROCKER SWITCH PENCIL HOLSTER: Brand: VALLEYLAB

## (undated) DEVICE — INSUFFLATION NEEDLE TO ESTABLISH PNEUMOPERITONEUM.: Brand: INSUFFLATION NEEDLE

## (undated) DEVICE — SHEAR HARMONIC 5MMX45CM -- ACE 7+

## (undated) DEVICE — FORCEPS BX CAP 240CM L RAD JAW 4

## (undated) DEVICE — SUT PROL 2-0 30IN CT2 BLU --

## (undated) DEVICE — LIQUIBAND RAPID ADHESIVE 36/CS 0.8ML: Brand: MEDLINE

## (undated) DEVICE — RELOAD STPL H4.1X2MM DIA60MM THCK TISS GRN 6 ROW PWR GST B

## (undated) DEVICE — PREP SKN PREVAIL 40ML APPL --

## (undated) DEVICE — 4-PORT MANIFOLD: Brand: NEPTUNE 2

## (undated) DEVICE — DRAIN SURG 15FR SIL RND CHN W/ TRCR FULL FLUT DBL WRP TRAD

## (undated) DEVICE — PIN SFTY L L2IN S STL FOR GRP HLD AND RET

## (undated) DEVICE — CATH IV SAFE STR 22GX1IN BLU -- PROTECTIV PLUS

## (undated) DEVICE — DECANTING SET

## (undated) DEVICE — COVADERM PLUS: Brand: DEROYAL

## (undated) DEVICE — EJECTOR SALIVA 6 IN FLX CLR

## (undated) DEVICE — SYR IRR CATH TIP LR ADPT 70ML -- CONVERT TO ITEM 363120

## (undated) DEVICE — ALL PURPOSE SPONGES,NON-WOVEN, 4 PLY: Brand: CURITY

## (undated) DEVICE — CATHETER JEJUSTMY AD 16FR 15CC L108IN THMB VLV FOR DCOMPR

## (undated) DEVICE — KENDALL RADIOLUCENT FOAM MONITORING ELECTRODE RECTANGULAR SHAPE: Brand: KENDALL

## (undated) DEVICE — MINOR: Brand: MEDLINE INDUSTRIES, INC.

## (undated) DEVICE — SYR 50ML SLIP TIP NSAF LF STRL --

## (undated) DEVICE — NEEDLE HYPO 18GA L1.5IN PNK POLYPR HUB S STL THN WALL FILL

## (undated) DEVICE — DRAIN SURG 15FR L3/16IN SIL RND 3/4 FLUT 3/16IN TRCR

## (undated) DEVICE — STAPLER INT L37CM STPL 21MM CIR ENDOSCP CRV INTLUMN B FRM

## (undated) DEVICE — RESERVOIR,SUCTION,100CC,SILICONE: Brand: MEDLINE

## (undated) DEVICE — CLIP SUT ENDOSCP F/2-0/3-0/4-0 -- LAPRA-TY

## (undated) DEVICE — SEALANT HEMSTAT 5ML HUM FIBRIN THROM 2 VI APPL DEV EVICEL

## (undated) DEVICE — SUTURE VCRL + SZ 3-0 L27IN ABSRB UD L26MM SH 1/2 CIR VCP416H

## (undated) DEVICE — YANKAUER,FLEXIBLE HANDLE,REGLR CAPACITY: Brand: MEDLINE INDUSTRIES, INC.

## (undated) DEVICE — SUTURE NONABSORBABLE MONOFILAMENT 2-0 FS 18 IN ETHILON 664H

## (undated) DEVICE — SET ADMIN 16ML TBNG L100IN 2 Y INJ SITE IV PIGGY BK DISP

## (undated) DEVICE — SUTURE MCRYL + SZ 3-0 L27IN ABSRB UD PS1 L24MM 3/8 CIR REV MCP936H

## (undated) DEVICE — AIRLIFE™ NASAL OXYGEN CANNULA CURVED, FLARED TIP WITH 14 FOOT (4.3 M) CRUSH-RESISTANT TUBING, OVER-THE-EAR STYLE: Brand: AIRLIFE™

## (undated) DEVICE — TISSUE RETRIEVAL SYSTEM: Brand: INZII RETRIEVAL SYSTEM

## (undated) DEVICE — APPLICATOR MEDICATED 26 CC SOLUTION HI LT ORNG CHLORAPREP

## (undated) DEVICE — GLOVE ORANGE PI 7   MSG9070

## (undated) DEVICE — TRAY CATH OD16FR SIL URIN M STATLOK STBL DEV SURSTP

## (undated) DEVICE — CUTTER ENDOSCP L340MM LIN ARTC SGL STROKE FIRING ENDOPATH

## (undated) DEVICE — SUTURE MCRYL + SZ 4-0 L27IN ABSRB UD L19MM PS-2 3/8 CIR MCP426H

## (undated) DEVICE — SUTURE VCRL + SZ 0 L18IN ABSRB UD L36MM CT-1 1/2 CIR VCP840D

## (undated) DEVICE — DEVICE INFL 60ML 12ATM CONVENIENT LOK REL HNDL HI PRSS FLX

## (undated) DEVICE — RAPIDPORT EZ APPLIER TOOL: Brand: RAPIDPORT EZ